# Patient Record
Sex: MALE | Race: WHITE | NOT HISPANIC OR LATINO | Employment: OTHER | URBAN - METROPOLITAN AREA
[De-identification: names, ages, dates, MRNs, and addresses within clinical notes are randomized per-mention and may not be internally consistent; named-entity substitution may affect disease eponyms.]

---

## 2018-12-06 ENCOUNTER — HOSPITAL ENCOUNTER (INPATIENT)
Facility: HOSPITAL | Age: 78
LOS: 12 days | Discharge: HOME HEALTH | DRG: 300 | End: 2018-12-18
Attending: HOSPITALIST | Admitting: HOSPITALIST
Payer: OTHER GOVERNMENT

## 2018-12-06 DIAGNOSIS — M86.171 OTHER ACUTE OSTEOMYELITIS OF RIGHT FOOT: ICD-10-CM

## 2018-12-06 DIAGNOSIS — M86.071 ACUTE HEMATOGENOUS OSTEOMYELITIS OF RIGHT FOOT: ICD-10-CM

## 2018-12-06 DIAGNOSIS — S91.301A OPEN WOUND OF RIGHT FOOT, INITIAL ENCOUNTER: ICD-10-CM

## 2018-12-06 DIAGNOSIS — I73.9 PERIPHERAL ARTERIAL DISEASE: ICD-10-CM

## 2018-12-06 DIAGNOSIS — I48.91 ATRIAL FIBRILLATION WITH SLOW VENTRICULAR RESPONSE: ICD-10-CM

## 2018-12-06 DIAGNOSIS — S98.111A AMPUTATED GREAT TOE OF RIGHT FOOT: ICD-10-CM

## 2018-12-06 PROBLEM — M86.9 OSTEOMYELITIS OF RIGHT FOOT: Status: ACTIVE | Noted: 2018-12-06

## 2018-12-06 PROBLEM — E11.9 TYPE 2 DIABETES MELLITUS, WITHOUT LONG-TERM CURRENT USE OF INSULIN: Status: ACTIVE | Noted: 2018-12-06

## 2018-12-06 PROBLEM — I50.22 CHRONIC SYSTOLIC HEART FAILURE: Status: ACTIVE | Noted: 2018-12-06

## 2018-12-06 PROBLEM — I10 ESSENTIAL HYPERTENSION: Status: ACTIVE | Noted: 2018-12-06

## 2018-12-06 LAB
ALBUMIN SERPL BCP-MCNC: 2.9 G/DL
ALP SERPL-CCNC: 90 U/L
ALT SERPL W/O P-5'-P-CCNC: 37 U/L
ANION GAP SERPL CALC-SCNC: 16 MMOL/L
APTT BLDCRRT: 25.8 SEC
AST SERPL-CCNC: 28 U/L
BASOPHILS # BLD AUTO: 0.09 K/UL
BASOPHILS NFR BLD: 0.8 %
BILIRUB SERPL-MCNC: 0.6 MG/DL
BUN SERPL-MCNC: 37 MG/DL
CALCIUM SERPL-MCNC: 10.5 MG/DL
CHLORIDE SERPL-SCNC: 102 MMOL/L
CO2 SERPL-SCNC: 20 MMOL/L
CREAT SERPL-MCNC: 1.6 MG/DL
DIFFERENTIAL METHOD: ABNORMAL
EOSINOPHIL # BLD AUTO: 0.4 K/UL
EOSINOPHIL NFR BLD: 3.2 %
ERYTHROCYTE [DISTWIDTH] IN BLOOD BY AUTOMATED COUNT: 13.3 %
EST. GFR  (AFRICAN AMERICAN): 46.7 ML/MIN/1.73 M^2
EST. GFR  (NON AFRICAN AMERICAN): 40.4 ML/MIN/1.73 M^2
GLUCOSE SERPL-MCNC: 137 MG/DL
HCT VFR BLD AUTO: 38.6 %
HGB BLD-MCNC: 12.2 G/DL
IMM GRANULOCYTES # BLD AUTO: 0.07 K/UL
IMM GRANULOCYTES NFR BLD AUTO: 0.6 %
INR PPP: 1.2
LYMPHOCYTES # BLD AUTO: 1.7 K/UL
LYMPHOCYTES NFR BLD: 14.4 %
MCH RBC QN AUTO: 29.3 PG
MCHC RBC AUTO-ENTMCNC: 31.6 G/DL
MCV RBC AUTO: 93 FL
MONOCYTES # BLD AUTO: 1.1 K/UL
MONOCYTES NFR BLD: 9.1 %
NEUTROPHILS # BLD AUTO: 8.3 K/UL
NEUTROPHILS NFR BLD: 71.9 %
NRBC BLD-RTO: 0 /100 WBC
PLATELET # BLD AUTO: 316 K/UL
PMV BLD AUTO: 10.5 FL
POCT GLUCOSE: 172 MG/DL (ref 70–110)
POTASSIUM SERPL-SCNC: 4.8 MMOL/L
PROT SERPL-MCNC: 8.5 G/DL
PROTHROMBIN TIME: 11.8 SEC
RBC # BLD AUTO: 4.17 M/UL
SODIUM SERPL-SCNC: 138 MMOL/L
WBC # BLD AUTO: 11.5 K/UL

## 2018-12-06 PROCEDURE — 94660 CPAP INITIATION&MGMT: CPT

## 2018-12-06 PROCEDURE — 99223 1ST HOSP IP/OBS HIGH 75: CPT | Mod: AI,GC,, | Performed by: STUDENT IN AN ORGANIZED HEALTH CARE EDUCATION/TRAINING PROGRAM

## 2018-12-06 PROCEDURE — 63600175 PHARM REV CODE 636 W HCPCS: Performed by: STUDENT IN AN ORGANIZED HEALTH CARE EDUCATION/TRAINING PROGRAM

## 2018-12-06 PROCEDURE — 11000001 HC ACUTE MED/SURG PRIVATE ROOM

## 2018-12-06 PROCEDURE — 85025 COMPLETE CBC W/AUTO DIFF WBC: CPT

## 2018-12-06 PROCEDURE — 85610 PROTHROMBIN TIME: CPT

## 2018-12-06 PROCEDURE — 93005 ELECTROCARDIOGRAM TRACING: CPT

## 2018-12-06 PROCEDURE — 25000003 PHARM REV CODE 250: Performed by: STUDENT IN AN ORGANIZED HEALTH CARE EDUCATION/TRAINING PROGRAM

## 2018-12-06 PROCEDURE — 87040 BLOOD CULTURE FOR BACTERIA: CPT

## 2018-12-06 PROCEDURE — 80053 COMPREHEN METABOLIC PANEL: CPT

## 2018-12-06 PROCEDURE — 85730 THROMBOPLASTIN TIME PARTIAL: CPT

## 2018-12-06 PROCEDURE — 36415 COLL VENOUS BLD VENIPUNCTURE: CPT

## 2018-12-06 PROCEDURE — 93010 ELECTROCARDIOGRAM REPORT: CPT | Mod: ,,, | Performed by: INTERNAL MEDICINE

## 2018-12-06 RX ORDER — INSULIN ASPART 100 [IU]/ML
0-5 INJECTION, SOLUTION INTRAVENOUS; SUBCUTANEOUS
Status: DISCONTINUED | OUTPATIENT
Start: 2018-12-06 | End: 2018-12-18 | Stop reason: HOSPADM

## 2018-12-06 RX ORDER — METFORMIN HYDROCHLORIDE 500 MG/1
1000 TABLET ORAL
COMMUNITY

## 2018-12-06 RX ORDER — ACETAMINOPHEN 325 MG/1
650 TABLET ORAL EVERY 4 HOURS PRN
Status: DISCONTINUED | OUTPATIENT
Start: 2018-12-06 | End: 2018-12-18 | Stop reason: HOSPADM

## 2018-12-06 RX ORDER — METOPROLOL SUCCINATE 25 MG/1
25 TABLET, EXTENDED RELEASE ORAL DAILY
COMMUNITY

## 2018-12-06 RX ORDER — ASPIRIN 81 MG/1
81 TABLET ORAL
COMMUNITY

## 2018-12-06 RX ORDER — LISINOPRIL 10 MG/1
5 TABLET ORAL DAILY
Status: ON HOLD | COMMUNITY
End: 2018-12-18 | Stop reason: HOSPADM

## 2018-12-06 RX ORDER — FUROSEMIDE 40 MG/1
40 TABLET ORAL 2 TIMES DAILY
COMMUNITY

## 2018-12-06 RX ORDER — SODIUM CHLORIDE 0.9 % (FLUSH) 0.9 %
5 SYRINGE (ML) INJECTION
Status: DISCONTINUED | OUTPATIENT
Start: 2018-12-06 | End: 2018-12-18 | Stop reason: HOSPADM

## 2018-12-06 RX ORDER — ACETAMINOPHEN 325 MG/1
650 TABLET ORAL EVERY 8 HOURS PRN
Status: DISCONTINUED | OUTPATIENT
Start: 2018-12-06 | End: 2018-12-18 | Stop reason: HOSPADM

## 2018-12-06 RX ORDER — IBUPROFEN 200 MG
16 TABLET ORAL
Status: DISCONTINUED | OUTPATIENT
Start: 2018-12-06 | End: 2018-12-18 | Stop reason: HOSPADM

## 2018-12-06 RX ORDER — METFORMIN HYDROCHLORIDE 500 MG/1
500 TABLET ORAL 2 TIMES DAILY WITH MEALS
Status: ON HOLD | COMMUNITY
End: 2018-12-06 | Stop reason: CLARIF

## 2018-12-06 RX ORDER — HEPARIN 100 UNIT/ML
10 SYRINGE INTRAVENOUS
Status: DISCONTINUED | OUTPATIENT
Start: 2018-12-06 | End: 2018-12-18 | Stop reason: HOSPADM

## 2018-12-06 RX ORDER — GLUCAGON 1 MG
1 KIT INJECTION
Status: DISCONTINUED | OUTPATIENT
Start: 2018-12-06 | End: 2018-12-18 | Stop reason: HOSPADM

## 2018-12-06 RX ORDER — ASPIRIN 81 MG/1
81 TABLET ORAL DAILY
Status: ON HOLD | COMMUNITY
End: 2018-12-06 | Stop reason: CLARIF

## 2018-12-06 RX ORDER — DABIGATRAN ETEXILATE 150 MG/1
150 CAPSULE ORAL DAILY
COMMUNITY

## 2018-12-06 RX ORDER — HEPARIN SODIUM,PORCINE/D5W 25000/250
12 INTRAVENOUS SOLUTION INTRAVENOUS CONTINUOUS
Status: DISCONTINUED | OUTPATIENT
Start: 2018-12-06 | End: 2018-12-11

## 2018-12-06 RX ORDER — GLIPIZIDE 10 MG/1
10 TABLET ORAL
Status: ON HOLD | COMMUNITY
End: 2018-12-06 | Stop reason: CLARIF

## 2018-12-06 RX ORDER — PRAVASTATIN SODIUM 10 MG/1
20 TABLET ORAL DAILY
Status: ON HOLD | COMMUNITY
End: 2018-12-06 | Stop reason: CLARIF

## 2018-12-06 RX ORDER — GLIPIZIDE 10 MG/1
10 TABLET ORAL NIGHTLY
COMMUNITY

## 2018-12-06 RX ORDER — IBUPROFEN 200 MG
24 TABLET ORAL
Status: DISCONTINUED | OUTPATIENT
Start: 2018-12-06 | End: 2018-12-18 | Stop reason: HOSPADM

## 2018-12-06 RX ORDER — SIMVASTATIN 20 MG/1
20 TABLET, FILM COATED ORAL DAILY
Status: DISCONTINUED | OUTPATIENT
Start: 2018-12-07 | End: 2018-12-07

## 2018-12-06 RX ORDER — FUROSEMIDE 40 MG/1
40 TABLET ORAL 2 TIMES DAILY
Status: DISCONTINUED | OUTPATIENT
Start: 2018-12-06 | End: 2018-12-18 | Stop reason: HOSPADM

## 2018-12-06 RX ORDER — ACETAMINOPHEN 500 MG
1000 TABLET ORAL EVERY 8 HOURS PRN
Status: DISCONTINUED | OUTPATIENT
Start: 2018-12-06 | End: 2018-12-18 | Stop reason: HOSPADM

## 2018-12-06 RX ORDER — SIMVASTATIN 10 MG/1
10 TABLET, FILM COATED ORAL NIGHTLY
Status: ON HOLD | COMMUNITY
End: 2018-12-07

## 2018-12-06 RX ORDER — LISINOPRIL 20 MG/1
40 TABLET ORAL DAILY
Status: DISCONTINUED | OUTPATIENT
Start: 2018-12-07 | End: 2018-12-06

## 2018-12-06 RX ADMIN — ACETAMINOPHEN 1000 MG: 500 TABLET, FILM COATED ORAL at 09:12

## 2018-12-06 RX ADMIN — CEFTRIAXONE 2 G: 2 INJECTION, SOLUTION INTRAVENOUS at 11:12

## 2018-12-06 RX ADMIN — HEPARIN SODIUM AND DEXTROSE 12 UNITS/KG/HR: 10000; 5 INJECTION INTRAVENOUS at 09:12

## 2018-12-06 RX ADMIN — FUROSEMIDE 40 MG: 40 TABLET ORAL at 09:12

## 2018-12-06 NOTE — PLAN OF CARE
"[facility] Acceptance [facility]      Transferring Facility/Hospital: [facility]      Referring Provider/Specialty giving report: Dr. Ignacio Aimn - [facility] medicine      Accepting Physician for admission to hospital: Khang Osorio MD    Date of Admission to Out    Date of acceptance:  12/6/2018    Patients name: Jai Godinez       Allergies:Review of patient's allergies indicates:  No Known Allergies     Reason for transfer:  Vascular surgery consult/CO2 angiography     Overview/ Report from Physician/Mid-Level Provider:    HPI:  77 y/o w/ CAD s/p CABG, Dm type 2, HTN, admitted to [facility] on 11/28 for acute on chronic systolic HF exacerbation, type 2 MI, and diabetic foot ulcer w/ infection concern for osteomyelitis and new onset afib with slow ventricular rate.     Patient with Right toe amputation done on 12/3, non-pulsatile blood flow after, podiatry has left wound open and pre-op DANILO show severe PAD. Vascular @ [facility] wanted to due angioplasty post-op, but CO2 angiography not available at [facility], patient was arranged for transfer to [facility] but limited by census.  Patient/family requested transfer elsewhere to accomplish procedure.  [facility] [place] and case was discussed with Dr. Gomez -Vascular, requested patient to transfer to [facility] medicine with vascular surgery consults.       S/p Amputation due to lack of blood flow, wound was left open with plans for wound closure after vascular intervention.  Patient receiving daily wound care with iodoform packing gauze, partial weight bearing to right heel.     [facility] sent partial records/progress notes to review.  Case discussed with Dr. Amin, brief summary belwo    For heart failure, noted to be improving and pt switched to ORAL bid lasix, reported that patient is likely "as dry as he will become" has some basilar crackles noted on exam.     For Afib with slow vr, ECHO with LV pressure elevated EF " 45-50% LA enlarged, moderate pHTN, plans to dc patient on Toprol XL 25mg  and chadsvasc is 6, have continued Heparin infusion inpatient but discharge on Dabigatran 150mg BID.     For STACY  - 1.7 on admit and downtrending    SANDIE on CPAP - using his home machine, setting unknown     DM2 - have been holding metformin/glipizide and cardiology recs that patient should be on SGLT2 inhibitor empagliflozin, and GLP-1RA - liraglutide.   A1c 8.1     Right Foot Osteomyelitis.  - MSSA wound and Group G strep from wound cultures that can be treated with doxycycline but clean margin cultures , blood cultures negative, on Ceftriaxone and per ID could be discharged on Doxycycline pending results of clean margin culture.     Code Status FULL CODE     VS: Temp:  [98.3 °F (36.8 °C)]   Pulse:  [59]   Resp:  [18]   BP: (113)/(59)   SpO2:  [97 %]       Labs:  12/06  Cbc 10.7  hgb 11.3 hct 33.1  plt     Cr 1.4, BUN 42      Radiographs:     DANILO Right TBI 0.00, Lt TBI 0.46   Left DANILO 0.7,  Right DANILO reported as falsely elevated.         To Do List upon arrival:    1. Keep NPO - Consult Vascular Surgery to determine timing of procedure   >Discuss if repeat DANILO or vascular lab studies indicated  2. Continue IV Heparin Infusion   3. Continue prior management for CHF, Diabetes  4. Continue IV ceftriaxone.  Dr. Amin ([phone]) requests to be called in coming days to provide update on pending clean margin cultures.   5.  Consult Podiatry for ongoing wound care and post-op management.    >pending completion of vascular procedure discuss with podiatry regarding completion of wound closure @ [facility] vs transfer back to [facility] for podiatry service to perform wound closure.   6. PT consultation.      Please call extension [phone] (if nobody answers, this will flip to a beeper, so put in your call back number) upon patient arrival to floor for Hospital Medicine admit team assignment and for additional admit orders for the patient.  Do not page the  attending, staff physician associate with the patient on arrival (may not be in-house at the time of arrival).  Rather, always call [phone] to reach the triage physician for orders and team assignment.         Khang Osorio M.D.  Attending Physician  [facility] Medicine Dept.  Pager: [phone]

## 2018-12-07 PROBLEM — S91.301A OPEN WOUND OF RIGHT FOOT: Status: ACTIVE | Noted: 2018-12-07

## 2018-12-07 PROBLEM — N18.30 STAGE 3 CHRONIC KIDNEY DISEASE: Status: ACTIVE | Noted: 2018-12-07

## 2018-12-07 LAB
ANION GAP SERPL CALC-SCNC: 10 MMOL/L
APTT BLDCRRT: 30.4 SEC
APTT BLDCRRT: 38 SEC
BASOPHILS # BLD AUTO: 0.08 K/UL
BASOPHILS NFR BLD: 0.8 %
BILIRUB UR QL STRIP: NEGATIVE
BUN SERPL-MCNC: 34 MG/DL
CALCIUM SERPL-MCNC: 9.7 MG/DL
CHLORIDE SERPL-SCNC: 103 MMOL/L
CLARITY UR REFRACT.AUTO: CLEAR
CO2 SERPL-SCNC: 23 MMOL/L
COLOR UR AUTO: YELLOW
CREAT SERPL-MCNC: 1.5 MG/DL
CREAT UR-MCNC: 80 MG/DL
CRP SERPL-MCNC: 93.42 MG/L
DIFFERENTIAL METHOD: ABNORMAL
EOSINOPHIL # BLD AUTO: 0.4 K/UL
EOSINOPHIL NFR BLD: 4.1 %
ERYTHROCYTE [DISTWIDTH] IN BLOOD BY AUTOMATED COUNT: 13.4 %
ERYTHROCYTE [SEDIMENTATION RATE] IN BLOOD BY WESTERGREN METHOD: 75 MM/HR
EST. GFR  (AFRICAN AMERICAN): 50.5 ML/MIN/1.73 M^2
EST. GFR  (NON AFRICAN AMERICAN): 43.7 ML/MIN/1.73 M^2
GLUCOSE SERPL-MCNC: 163 MG/DL
GLUCOSE UR QL STRIP: NEGATIVE
HCT VFR BLD AUTO: 34.4 %
HGB BLD-MCNC: 11 G/DL
HGB UR QL STRIP: NEGATIVE
IMM GRANULOCYTES # BLD AUTO: 0.06 K/UL
IMM GRANULOCYTES NFR BLD AUTO: 0.6 %
KETONES UR QL STRIP: NEGATIVE
LEUKOCYTE ESTERASE UR QL STRIP: NEGATIVE
LYMPHOCYTES # BLD AUTO: 1.9 K/UL
LYMPHOCYTES NFR BLD: 18.2 %
MCH RBC QN AUTO: 29.6 PG
MCHC RBC AUTO-ENTMCNC: 32 G/DL
MCV RBC AUTO: 93 FL
MONOCYTES # BLD AUTO: 1.2 K/UL
MONOCYTES NFR BLD: 11.7 %
NEUTROPHILS # BLD AUTO: 6.6 K/UL
NEUTROPHILS NFR BLD: 64.6 %
NITRITE UR QL STRIP: NEGATIVE
NRBC BLD-RTO: 0 /100 WBC
OSMOLALITY UR: 467 MOSM/KG
PH UR STRIP: 5 [PH] (ref 5–8)
PLATELET # BLD AUTO: 289 K/UL
PMV BLD AUTO: 10.9 FL
POCT GLUCOSE: 153 MG/DL (ref 70–110)
POCT GLUCOSE: 189 MG/DL (ref 70–110)
POCT GLUCOSE: 191 MG/DL (ref 70–110)
POCT GLUCOSE: 232 MG/DL (ref 70–110)
POTASSIUM SERPL-SCNC: 4.6 MMOL/L
PROT UR QL STRIP: NEGATIVE
RBC # BLD AUTO: 3.71 M/UL
SODIUM SERPL-SCNC: 136 MMOL/L
SODIUM UR-SCNC: 74 MMOL/L
SP GR UR STRIP: 1.01 (ref 1–1.03)
URN SPEC COLLECT METH UR: NORMAL
UUN UR-MCNC: 634 MG/DL
WBC # BLD AUTO: 10.19 K/UL

## 2018-12-07 PROCEDURE — 99223 1ST HOSP IP/OBS HIGH 75: CPT | Mod: ,,, | Performed by: SURGERY

## 2018-12-07 PROCEDURE — 85652 RBC SED RATE AUTOMATED: CPT

## 2018-12-07 PROCEDURE — 25000003 PHARM REV CODE 250: Performed by: STUDENT IN AN ORGANIZED HEALTH CARE EDUCATION/TRAINING PROGRAM

## 2018-12-07 PROCEDURE — 80048 BASIC METABOLIC PNL TOTAL CA: CPT

## 2018-12-07 PROCEDURE — 86141 C-REACTIVE PROTEIN HS: CPT

## 2018-12-07 PROCEDURE — 81003 URINALYSIS AUTO W/O SCOPE: CPT

## 2018-12-07 PROCEDURE — 63600175 PHARM REV CODE 636 W HCPCS: Performed by: STUDENT IN AN ORGANIZED HEALTH CARE EDUCATION/TRAINING PROGRAM

## 2018-12-07 PROCEDURE — 87070 CULTURE OTHR SPECIMN AEROBIC: CPT

## 2018-12-07 PROCEDURE — 93880 EXTRACRANIAL BILAT STUDY: CPT | Performed by: SURGERY

## 2018-12-07 PROCEDURE — 84540 ASSAY OF URINE/UREA-N: CPT

## 2018-12-07 PROCEDURE — 85025 COMPLETE CBC W/AUTO DIFF WBC: CPT

## 2018-12-07 PROCEDURE — 82570 ASSAY OF URINE CREATININE: CPT

## 2018-12-07 PROCEDURE — 85730 THROMBOPLASTIN TIME PARTIAL: CPT | Mod: 91

## 2018-12-07 PROCEDURE — 87075 CULTR BACTERIA EXCEPT BLOOD: CPT

## 2018-12-07 PROCEDURE — 83935 ASSAY OF URINE OSMOLALITY: CPT

## 2018-12-07 PROCEDURE — 11000001 HC ACUTE MED/SURG PRIVATE ROOM

## 2018-12-07 PROCEDURE — 99223 1ST HOSP IP/OBS HIGH 75: CPT | Mod: ,,, | Performed by: PODIATRIST

## 2018-12-07 PROCEDURE — 85730 THROMBOPLASTIN TIME PARTIAL: CPT

## 2018-12-07 PROCEDURE — A4216 STERILE WATER/SALINE, 10 ML: HCPCS | Performed by: STUDENT IN AN ORGANIZED HEALTH CARE EDUCATION/TRAINING PROGRAM

## 2018-12-07 PROCEDURE — 93922 UPR/L XTREMITY ART 2 LEVELS: CPT | Performed by: SURGERY

## 2018-12-07 PROCEDURE — 84300 ASSAY OF URINE SODIUM: CPT

## 2018-12-07 PROCEDURE — 36415 COLL VENOUS BLD VENIPUNCTURE: CPT

## 2018-12-07 PROCEDURE — 99233 SBSQ HOSP IP/OBS HIGH 50: CPT | Mod: GC,,, | Performed by: STUDENT IN AN ORGANIZED HEALTH CARE EDUCATION/TRAINING PROGRAM

## 2018-12-07 PROCEDURE — 63600175 PHARM REV CODE 636 W HCPCS: Performed by: NURSE PRACTITIONER

## 2018-12-07 PROCEDURE — 99223 1ST HOSP IP/OBS HIGH 75: CPT | Mod: ,,, | Performed by: INTERNAL MEDICINE

## 2018-12-07 RX ORDER — PRAVASTATIN SODIUM 10 MG/1
10 TABLET ORAL DAILY
Status: DISCONTINUED | OUTPATIENT
Start: 2018-12-08 | End: 2018-12-12

## 2018-12-07 RX ORDER — SODIUM CHLORIDE, SODIUM LACTATE, POTASSIUM CHLORIDE, CALCIUM CHLORIDE 600; 310; 30; 20 MG/100ML; MG/100ML; MG/100ML; MG/100ML
INJECTION, SOLUTION INTRAVENOUS CONTINUOUS
Status: ACTIVE | OUTPATIENT
Start: 2018-12-07 | End: 2018-12-07

## 2018-12-07 RX ORDER — CEFAZOLIN SODIUM 1 G/3ML
2 INJECTION, POWDER, FOR SOLUTION INTRAMUSCULAR; INTRAVENOUS
Status: DISCONTINUED | OUTPATIENT
Start: 2018-12-07 | End: 2018-12-10

## 2018-12-07 RX ORDER — PRAVASTATIN SODIUM 20 MG/1
10 TABLET ORAL DAILY
Status: ON HOLD | COMMUNITY
End: 2018-12-18 | Stop reason: HOSPADM

## 2018-12-07 RX ADMIN — CEFAZOLIN 2 G: 330 INJECTION, POWDER, FOR SOLUTION INTRAMUSCULAR; INTRAVENOUS at 09:12

## 2018-12-07 RX ADMIN — SODIUM CHLORIDE, SODIUM LACTATE, POTASSIUM CHLORIDE, AND CALCIUM CHLORIDE: .6; .31; .03; .02 INJECTION, SOLUTION INTRAVENOUS at 12:12

## 2018-12-07 RX ADMIN — HEPARIN SODIUM AND DEXTROSE 12 UNITS/KG/HR: 10000; 5 INJECTION INTRAVENOUS at 02:12

## 2018-12-07 RX ADMIN — HEPARIN SODIUM AND DEXTROSE 17 UNITS/KG/HR: 10000; 5 INJECTION INTRAVENOUS at 07:12

## 2018-12-07 RX ADMIN — CEFAZOLIN 2 G: 330 INJECTION, POWDER, FOR SOLUTION INTRAMUSCULAR; INTRAVENOUS at 02:12

## 2018-12-07 RX ADMIN — ACETAMINOPHEN 650 MG: 325 TABLET ORAL at 09:12

## 2018-12-07 RX ADMIN — FUROSEMIDE 40 MG: 40 TABLET ORAL at 09:12

## 2018-12-07 RX ADMIN — ACETAMINOPHEN 1000 MG: 500 TABLET, FILM COATED ORAL at 08:12

## 2018-12-07 RX ADMIN — SIMVASTATIN 20 MG: 20 TABLET, FILM COATED ORAL at 08:12

## 2018-12-07 RX ADMIN — ACETAMINOPHEN 1000 MG: 500 TABLET, FILM COATED ORAL at 04:12

## 2018-12-07 RX ADMIN — FUROSEMIDE 40 MG: 40 TABLET ORAL at 08:12

## 2018-12-07 RX ADMIN — Medication 5 ML: at 09:12

## 2018-12-07 RX ADMIN — INSULIN ASPART 2 UNITS: 100 INJECTION, SOLUTION INTRAVENOUS; SUBCUTANEOUS at 05:12

## 2018-12-07 NOTE — SUBJECTIVE & OBJECTIVE
"Past Medical History:   Diagnosis Date    CHF (congestive heart failure)     Coronary artery disease     Diabetes mellitus     Diverticulosis     Hx of CABG     Hypertension        Past Surgical History:   Procedure Laterality Date    APPENDECTOMY      BYPASS GRAFT         Review of patient's allergies indicates:  No Known Allergies    No current facility-administered medications on file prior to encounter.      Current Outpatient Medications on File Prior to Encounter   Medication Sig    aspirin (ECOTRIN) 81 MG EC tablet Take 81 mg by mouth.    dabigatran etexilate (PRADAXA) 150 mg Cap Take 150 mg by mouth once daily. "Do NOT break, chew, or open capsules."    furosemide (LASIX) 40 MG tablet Take 40 mg by mouth 2 (two) times daily.    glipiZIDE (GLUCOTROL) 10 MG tablet Take 10 mg by mouth.    lisinopril (PRINIVIL,ZESTRIL) 40 MG tablet Take 40 mg by mouth.    metFORMIN (GLUCOPHAGE) 500 MG tablet Take 500 mg by mouth.    metoprolol succinate (TOPROL-XL) 25 MG 24 hr tablet Take 25 mg by mouth once daily.    simvastatin (ZOCOR) 20 MG tablet Take 20 mg by mouth.    [DISCONTINUED] aspirin (ECOTRIN) 81 MG EC tablet Take 81 mg by mouth once daily.    [DISCONTINUED] glipiZIDE (GLUCOTROL) 10 MG tablet Take 10 mg by mouth 2 (two) times daily before meals.    [DISCONTINUED] metFORMIN (GLUCOPHAGE) 500 MG tablet Take 500 mg by mouth 2 (two) times daily with meals.    [DISCONTINUED] pravastatin (PRAVACHOL) 10 MG tablet Take 20 mg by mouth once daily.     Family History     Problem Relation (Age of Onset)    Arthritis Sister    Dementia Mother    No Known Problems Father        Tobacco Use    Smoking status: Former Smoker     Packs/day: 1.00     Years: 25.00     Pack years: 25.00     Types: Cigarettes    Smokeless tobacco: Never Used   Substance and Sexual Activity    Alcohol use: No     Frequency: Never    Drug use: No    Sexual activity: Not Currently     Partners: Female     Review of Systems "   Constitutional: Negative for appetite change, chills, diaphoresis, fatigue and fever.   HENT: Negative for congestion, sore throat and trouble swallowing.    Eyes: Negative for photophobia, pain and discharge.   Respiratory: Negative for cough, chest tightness and shortness of breath.    Cardiovascular: Negative for chest pain, palpitations and leg swelling.   Gastrointestinal: Negative for abdominal distention, abdominal pain, diarrhea, nausea and vomiting.   Genitourinary: Negative for difficulty urinating and dysuria.   Musculoskeletal: Positive for joint swelling. Negative for back pain, myalgias and neck pain.   Skin: Negative for color change, pallor and rash.   Neurological: Negative for dizziness, light-headedness, numbness and headaches.     Objective:     Vital Signs (Most Recent):  Temp: 97.6 °F (36.4 °C) (12/06/18 1813)  Pulse: (!) 56 (12/06/18 1813)  Resp: 18 (12/06/18 1813)  BP: 125/62 (12/06/18 1813)  SpO2: 96 % (12/06/18 1813) Vital Signs (24h Range):  Temp:  [97.6 °F (36.4 °C)-98.3 °F (36.8 °C)] 97.6 °F (36.4 °C)  Pulse:  [45-59] 56  Resp:  [18] 18  SpO2:  [96 %-97 %] 96 %  BP: (101-125)/(53-62) 125/62        There is no height or weight on file to calculate BMI.    Physical Exam   Constitutional: He is oriented to person, place, and time. He appears well-developed and well-nourished. No distress.   HENT:   Head: Normocephalic and atraumatic.   Mouth/Throat: Uvula is midline, oropharynx is clear and moist and mucous membranes are normal.   Eyes: Conjunctivae are normal. Pupils are equal, round, and reactive to light.   Neck: Neck supple. No JVD present.   Cardiovascular: Normal rate, normal heart sounds and normal pulses. An irregularly irregular rhythm present.   No murmur heard.  Pulses:       Radial pulses are 2+ on the right side, and 2+ on the left side.        Dorsalis pedis pulses are 2+ on the right side, and 2+ on the left side.   Pulmonary/Chest: Effort normal and breath sounds normal.  No respiratory distress.   There are minimal crackles at the posterior lower lobes.    Abdominal: Soft. Normal appearance and bowel sounds are normal. He exhibits no distension. There is no tenderness.   Musculoskeletal:   There is no swelling of the lower extremities. Right foot wrapped in Kerlex.    Neurological: He is alert and oriented to person, place, and time. No cranial nerve deficit. GCS eye subscore is 4. GCS verbal subscore is 5. GCS motor subscore is 6. He displays no Babinski's sign on the left side.   Skin: Skin is warm and dry. No bruising and no rash noted.         CRANIAL NERVES     CN III, IV, VI   Pupils are equal, round, and reactive to light.      Significant Labs: A1C: No results for input(s): HGBA1C in the last 4320 hours.     Blood Culture: No results for input(s): LABBLOO in the last 48 hours.     BMP: No results for input(s): GLU, NA, K, CL, CO2, BUN, CREATININE, CALCIUM, MG in the last 48 hours.     CBC: No results for input(s): WBC, HGB, HCT, PLT in the last 48 hours.    Significant Imaging: I have reviewed all pertinent imaging results/findings within the past 24 hours.

## 2018-12-07 NOTE — PROGRESS NOTES
[facility] Hospital Medicine  Progress Note    Patient Name: Jai Godinez  MRN: [id number]  Patient Class: IP- Inpatient   Admission Date: 12/6/2018  Length of Stay: 1 days  Attending Physician: Tess Garcia MD  Primary Care Provider: Pepe Valverde MD    [facility] Medicine Team: Networked reference to record PCT  Miguel Angel Jones MD    Subjective:     Principal Problem:Amputated great toe of right foot    HPI:  This is a 78 year old male transferred from the [facility] for vascular surgery repair of right great toe status post amputation. He has a PMH of quadruple bypass (2001), severe peripheral artery disease, HFrEF (EF 45-50%), T2DM (A1c 8.1), HTN, SANDIE (on CPAP nightly) and diverticulosis.     He was hospitalized in [place] from 11/14 to 11/21 for acute on chronic heart failure exacerbation; discharge home with resolution of symptoms. He then developed one day of progressively worsening swelling of the right great toe associated with erythematous skin changes; for relief, he cut the skin of the toe with his pocket knife, at which point noticed purulent discharge, [facility]     Patient underwent right toe amputation done on 12/3 for pre-operative DANILO indicative of severe PAD. His post-operative course was complicated by development non-pulsatile blood flow after, as a result podiatry has left wound open. Vascular  at [facility] wanted to due angioplasty post-op, but CO2 angiography wasn't available. Also, concern for possible right foot osteomyelitis with wound cultures growing MSSA and Group G Streptococcus; blood cultures negative; ID there treated with Ceftriaxone while in-patient, with plans to discharge on Doxycycline.  In addition, on presentation, he was noted to have new development of Afib with slow ventricular response; rate control with Metoprolol and started on heparin drip while inpatient; transitioned to Dabigatran prior to transfer.     Patient/family requested  transfer elsewhere to accomplish procedure. [facility] [place] and case was discussed with Dr. Patricia SaldanaVascular,  who requested patient to transfer to hospital medicine with vascular surgery consults.        On arrival here, he reports episodic cramping pain isolated to the amputation site occurring approximately Q5min. Pain relieved by taking Tylenol PRN. Denies fevers, chills, chest pain, SOB, palpitations, numbness of extremities.         Hospital Course:  12/07: No acute events overnight. Vascular surgery assessed this morning; recommended repeat DANILO and tentative angiogram today. Wound care and podiatry consulted for open wound and right foot osteomyelitis.  Wound care defering to podiatry. Podiatry repeating cultures and XR of foot. ID consulted for anticipated stop date regarding antibiotics.     Interval History: Patient seen at bedside this morning. He reports sleeping well throughout the night. His foot and toe pain continues to be controlled with Tylenol PRN. He was NPO at midnight for anticipated vascular procedure today. He is urinating well. He has no complaints.     Review of Systems   Constitutional: Negative for appetite change, chills, diaphoresis, fatigue and fever.   HENT: Negative for congestion, sore throat and trouble swallowing.    Eyes: Negative for photophobia, pain and discharge.   Respiratory: Negative for cough, chest tightness and shortness of breath.    Cardiovascular: Negative for chest pain, palpitations and leg swelling.   Gastrointestinal: Negative for abdominal pain, diarrhea, nausea and vomiting.   Genitourinary: Negative for difficulty urinating.   Musculoskeletal: Negative for back pain, myalgias and neck pain.   Skin: Positive for wound. Negative for pallor and rash.   Neurological: Negative for light-headedness, numbness and headaches.     Objective:     Vital Signs (Most Recent):  Temp: 98.5 °F (36.9 °C) (12/07/18 0843)  Pulse: 63 (12/07/18 0843)  Resp: 18  (12/07/18 0843)  BP: 138/60 (12/07/18 0843)  SpO2: (!) 93 % (12/07/18 0843) Vital Signs (24h Range):  Temp:  [97.6 °F (36.4 °C)-98.5 °F (36.9 °C)] 98.5 °F (36.9 °C)  Pulse:  [45-68] 63  Resp:  [18-20] 18  SpO2:  [93 %-98 %] 93 %  BP: (101-138)/(53-75) 138/60     Weight: 100.7 kg (221 lb 14.4 oz)  Body mass index is 32.77 kg/m².    Intake/Output Summary (Last 24 hours) at 12/7/2018 0857  Last data filed at 12/7/2018 0606  Gross per 24 hour   Intake 398.9 ml   Output --   Net 398.9 ml      Physical Exam   Constitutional: He is oriented to person, place, and time. He appears well-developed and well-nourished. No distress.   HENT:   Head: Normocephalic and atraumatic.   Mouth/Throat: Uvula is midline, oropharynx is clear and moist and mucous membranes are normal.   Eyes: Conjunctivae are normal. Pupils are equal, round, and reactive to light.   Neck: No JVD present.   Cardiovascular: Normal rate and normal heart sounds. An irregularly irregular rhythm present.   No murmur heard.  Pulses:       Radial pulses are 2+ on the right side, and 2+ on the left side.        Dorsalis pedis pulses are 1+ on the right side, and 2+ on the left side.   Pulmonary/Chest: Effort normal and breath sounds normal. No respiratory distress.   Abdominal: Soft. Normal appearance and bowel sounds are normal. He exhibits no distension. There is no tenderness.   Neurological: He is alert and oriented to person, place, and time.   Skin: Skin is warm and dry. No bruising and no rash noted.     Significant Labs:   Blood Culture:   Recent Labs   Lab 12/06/18 1951   LABBLOO No Growth to date  No Growth to date     BMP:   Recent Labs   Lab 12/07/18 0459   *      K 4.6      CO2 23   BUN 34*   CREATININE 1.5*   CALCIUM 9.7     CBC:   Recent Labs   Lab 12/06/18  1950 12/07/18 0459   WBC 11.50 10.19   HGB 12.2* 11.0*   HCT 38.6* 34.4*    289     Coagulation:   Recent Labs   Lab 12/06/18  1950  12/07/18  0459   INR 1.2  --   --     APTT  --    < > 30.4    < > = values in this interval not displayed.     Magnesium: No results for input(s): MG in the last 48 hours.    Significant Imaging: I have reviewed all pertinent imaging results/findings within the past 24 hours.    Assessment/Plan:      * Amputated great toe of right foot    · Status post amputation of first toe of right foot on 12/03 at [facility] secondary to either severe PAD with possible super-imposed osteomyelitis.   · [facility] that [facility] course was complicated by development non-pulsatile blood flow after, as a result podiatry has left wound open. Vascular at [facility] wanted to due angioplasty post-op, but CO2 angiography wasn't available, prompting transfer here. See vascular surgery and podiatry note for images of wound.   · Case discussed with Dr. Gomez in Vascular Surgery here at transfer. Vascular surgery evaluated; recommended repeating DANILO's and tentatively planning for angiogram today. DANILO's ordered.   · Exam notable for diminished pulses in right foot and lower extremity.   · NPO for possible vascular procedure.   · Will resume heparin drip at low intensity for anticoagulation with new Afib.   · Wound care consulted; deferred care recommendations to podiatry.        Open wound of right foot    See assessment for right great toe amputation and right foot osteomyelitis.        Stage 3 chronic kidney disease    · Unclear of baseline; no outside records available.   · Could be acute, but patient with history of poorly controlled T2DM.   · BUN 37, Cr 1.6, and GFR 40 on admission.   · Holding home Lisinopril in setting of possible STACY.   · LR maintenance ordered.   · BMP daily.           Osteomyelitis of right foot    · Concern for possible right foot osteomyelitis with wound cultures growing MSSA and Group G Streptococcus; blood cultures negative; ID there treated with Ceftriaxone while in-patient, with plans to discharge on Doxycycline.  · Unclear if patient had imaging  studies suggesting or confirming.   · Afebrile. No leukocytosis. ESR and CRP added on for today.   · Blood cultures ordered.   · Resuming Ceftriaxone 2g Q24. ID consulted for recommendations regarding correct choice of antibiotic at [facility] and anticipated stop date of course.   · Podiatry consulted; wound cultures and XR of foot ordered. Wound to be dressed with Betadyne and Kerflex for now. Recommending likely wound vac at discharge.        Atrial fibrillation with slow ventricular response    · Irregular irregular rhythm on exam with HR controlled in lower 60's.   · Will hold home Metoprolol for now.   · Started on heparin infusion at [facility] with plans to transition with Dabigatran.   · INR 1.2 on admission.   · Will start heparin infusion here with low intensity and follow INR until at therapeutic level.        Chronic systolic heart failure    · Last EF 40-50% at [facility] per transfer note.   · No signs of fluid overload on exam.   · Managed with Lasix 40 mg BID, Lisinopril 40 mg, and Metoprolol daily.   · Holding Metoprolol temporarily with HR on admission in lower 60's and Lisinopril with possible STACY.   · Will order cardiac and diabetic diet.         Essential hypertension    · Controlled.   · Resuming home Lisinopril and Lasix, and holding Metoprolol with HR in lower 60's.        Type 2 diabetes mellitus, without long-term current use of insulin    · Last A1c 8 at outside facility.   · Holding home Metformin and Glipizide  · Will order low dose sliding scale on admission.        Peripheral arterial disease    See assessment for amputation of great right toe.          VTE Risk Mitigation (From admission, onward)        Ordered     heparin, porcine (PF) 100 unit/mL injection flush 10 Units  As needed (PRN)      12/06/18 2119     heparin 25,000 units in dextrose 5% 250 mL (100 units/mL) infusion LOW INTENSITY nomogram - [facility]  Continuous      12/06/18 1937     heparin 25,000 units in dextrose 5% (100 units/ml) IV bolus  from bag - ADDITIONAL PRN BOLUS - 30 units/kg  As needed (PRN)      12/06/18 1937     heparin 25,000 units in dextrose 5% (100 units/ml) IV bolus from bag - ADDITIONAL PRN BOLUS - 60 units/kg  As needed (PRN)      12/06/18 1937     IP VTE HIGH RISK PATIENT  Once      12/06/18 1859              Miguel Angel Jones MD  Department of Hospital Medicine   [facility]

## 2018-12-07 NOTE — HPI
This is a 78 year old female transferred from the [facility] for vascular surgery repair of right great toe status post amputation. He has a PMH of quadruple bypass (2001), severe peripheral artery disease, HFrEF (EF 45-50%), T2DM (A1c 8.1), HTN, SANDIE (on CPAP nightly) and diverticulosis.     He was hospitalized in [place] from 11/14 to 11/21 for acute on chronic heart failure exacerbation; discharge home with resolution of symptoms. He then developed one day of progressively worsening swelling of the right great toe associated with erythematous skin changes; for relief, he cut the skin of the toe with his pocket knife, at which point noticed purulent discharge, [facility].     Patient underwent right toe amputation done on 12/3 for pre-operative DANILO indicative of severe PAD. His post-operative course was complicated by development non-pulsatile blood flow after, as a result podiatry has left wound open. Vascular at [facility] wanted to due angioplasty post-op, but CO2 angiography wasn't available. Also, concern for possible right foot osteomyelitis with wound cultures growing MSSA and Group G Streptococcus; blood cultures negative; ID there treated with Ceftriaxone while in-patient, with plans to discharge on Doxycycline.  In addition, on presentation, he was noted to have new development of Afib with slow ventricular response; rate control with Metoprolol and started on heparin drip while inpatient; transitioned to Dabigatran prior to transfer.     Patient/family requested transfer elsewhere to accomplish procedure. [facility] Transfer center contacted today and case was discussed with Dr. Patricia Sweeney, who requested patient to transfer to hospital medicine with vascular surgery consults.        On arrival here, he reports episodic cramping pain isolated to the amputation site occurring approximately Q5min. Pain relieved by taking Tylenol PRN. Denies fevers, chills, chest pain, SOB, palpitations, numbness of  extremities.

## 2018-12-07 NOTE — SUBJECTIVE & OBJECTIVE
"Medications Prior to Admission   Medication Sig Dispense Refill Last Dose    aspirin (ECOTRIN) 81 MG EC tablet Take 81 mg by mouth.   Unknown at Unknown time    dabigatran etexilate (PRADAXA) 150 mg Cap Take 150 mg by mouth once daily. "Do NOT break, chew, or open capsules."   Unknown at Unknown time    furosemide (LASIX) 40 MG tablet Take 40 mg by mouth 2 (two) times daily.   Unknown at Unknown time    glipiZIDE (GLUCOTROL) 10 MG tablet Take 10 mg by mouth.   Unknown at Unknown time    lisinopril (PRINIVIL,ZESTRIL) 40 MG tablet Take 40 mg by mouth.   Unknown at Unknown time    metFORMIN (GLUCOPHAGE) 500 MG tablet Take 500 mg by mouth.   Unknown at Unknown time    metoprolol succinate (TOPROL-XL) 25 MG 24 hr tablet Take 25 mg by mouth once daily.   Unknown at Unknown time    simvastatin (ZOCOR) 20 MG tablet Take 20 mg by mouth.   Unknown at Unknown time     Review of patient's allergies indicates:  No Known Allergies    Past Medical History:   Diagnosis Date    CHF (congestive heart failure)     Coronary artery disease     Diabetes mellitus     Diverticulosis     Hx of CABG     Hypertension      Past Surgical History:   Procedure Laterality Date    APPENDECTOMY      BYPASS GRAFT       Family History     Problem Relation (Age of Onset)    Arthritis Sister    Dementia Mother    No Known Problems Father        Tobacco Use    Smoking status: Former Smoker     Packs/day: 1.00     Years: 25.00     Pack years: 25.00     Types: Cigarettes    Smokeless tobacco: Never Used   Substance and Sexual Activity    Alcohol use: No     Frequency: Never    Drug use: No    Sexual activity: Not Currently     Partners: Female     Review of Systems   Constitutional: Negative for activity change, chills and fever.   HENT: Negative for congestion.    Cardiovascular: Negative for leg swelling.   Gastrointestinal: Negative for abdominal pain.   Musculoskeletal: Negative for back pain.   Neurological: Negative for " dizziness.       Objective:     Vital Signs (Most Recent):  Temp: 97.6 °F (36.4 °C) (12/07/18 0454)  Pulse: (!) 50 (12/07/18 0454)  Resp: 20 (12/07/18 0454)  BP: 124/75 (12/07/18 0454)  SpO2: 97 % (12/07/18 0454) Vital Signs (24h Range):  Temp:  [97.6 °F (36.4 °C)-98.3 °F (36.8 °C)] 97.6 °F (36.4 °C)  Pulse:  [45-68] 50  Resp:  [18-20] 20  SpO2:  [96 %-98 %] 97 %  BP: (101-125)/(53-75) 124/75     Weight: 100.7 kg (221 lb 14.4 oz)  Body mass index is 32.77 kg/m².    Physical Exam   Constitutional: He is oriented to person, place, and time. He appears well-developed and well-nourished. No distress.   HENT:   Head: Normocephalic and atraumatic.   Eyes: No scleral icterus.   Neck: Neck supple.   Cardiovascular: Normal rate.   L- 2+ palpable femoral, popliteal, DP, PT  R- 2+ femoral, 1+ popliteal, non palpable DP, PT   Pulmonary/Chest: Effort normal. No respiratory distress.   Abdominal: Soft.   Musculoskeletal: He exhibits no edema.   Neurological: He is alert and oriented to person, place, and time.   Skin: Skin is warm.         Significant Labs:  ABGs: No results for input(s): PH, PCO2, PO2, HCO3, POCSATURATED, BE in the last 48 hours.  Cardiac markers: No results for input(s): CKMB, CPKMB, TROPONINT, TROPONINI, MYOGLOBIN in the last 48 hours.  CBC:   Recent Labs   Lab 12/07/18 0459   WBC 10.19   RBC 3.71*   HGB 11.0*   HCT 34.4*      MCV 93   MCH 29.6   MCHC 32.0     CMP:   Recent Labs   Lab 12/06/18  1950 12/07/18 0459   * 163*   CALCIUM 10.5 9.7   ALBUMIN 2.9*  --    PROT 8.5*  --     136   K 4.8 4.6   CO2 20* 23    103   BUN 37* 34*   CREATININE 1.6* 1.5*   ALKPHOS 90  --    ALT 37  --    AST 28  --    BILITOT 0.6  --      All pertinent labs from the last 24 hours have been reviewed.    Significant Diagnostics:  I have reviewed and interpreted all pertinent imaging results/findings within the past 24 hours.

## 2018-12-07 NOTE — SUBJECTIVE & OBJECTIVE
Scheduled Meds:   cefTRIAXone (ROCEPHIN) IVPB  2 g Intravenous Q24H    furosemide  40 mg Oral BID    simvastatin  20 mg Oral Daily     Continuous Infusions:   heparin (porcine) in D5W 15 Units/kg/hr (12/07/18 0718)    lactated ringers       PRN Meds:acetaminophen, acetaminophen, acetaminophen, dextrose 50%, dextrose 50%, glucagon (human recombinant), glucose, glucose, heparin (PORCINE), heparin (PORCINE), heparin, porcine (PF), insulin aspart U-100, promethazine (PHENERGAN) IVPB, sodium chloride 0.9%    Review of patient's allergies indicates:  No Known Allergies     Past Medical History:   Diagnosis Date    CHF (congestive heart failure)     Coronary artery disease     Diabetes mellitus     Diverticulosis     Hx of CABG     Hypertension      Past Surgical History:   Procedure Laterality Date    APPENDECTOMY      BYPASS GRAFT         Family History     Problem Relation (Age of Onset)    Arthritis Sister    Dementia Mother    No Known Problems Father        Tobacco Use    Smoking status: Former Smoker     Packs/day: 1.00     Years: 25.00     Pack years: 25.00     Types: Cigarettes    Smokeless tobacco: Never Used   Substance and Sexual Activity    Alcohol use: No     Frequency: Never    Drug use: No    Sexual activity: Not Currently     Partners: Female     Review of Systems   Constitutional: Negative for chills, diaphoresis, fatigue and fever.   Respiratory: Negative for chest tightness and shortness of breath.    Cardiovascular: Positive for leg swelling. Negative for chest pain.   Gastrointestinal: Negative for nausea and vomiting.   Skin: Positive for color change and wound.   Neurological: Positive for numbness.     Objective:     Vital Signs (Most Recent):  Temp: 98.5 °F (36.9 °C) (12/07/18 0843)  Pulse: 63 (12/07/18 0843)  Resp: 18 (12/07/18 0843)  BP: 138/60 (12/07/18 0843)  SpO2: (!) 93 % (12/07/18 0843) Vital Signs (24h Range):  Temp:  [97.6 °F (36.4 °C)-98.5 °F (36.9 °C)] 98.5 °F (36.9  °C)  Pulse:  [45-68] 63  Resp:  [18-20] 18  SpO2:  [93 %-98 %] 93 %  BP: (101-138)/(53-75) 138/60     Weight: 100.7 kg (221 lb 14.4 oz)  Body mass index is 32.77 kg/m².    Foot Exam    Laboratory:  A1C: No results for input(s): HGBA1C in the last 4320 hours.  Blood Cultures:   Recent Labs   Lab 12/06/18 1951   LABBLOO No Growth to date  No Growth to date     BMP:   Recent Labs   Lab 12/07/18 0459   *      K 4.6      CO2 23   BUN 34*   CREATININE 1.5*   CALCIUM 9.7     CBC:   Recent Labs   Lab 12/07/18 0459   WBC 10.19   RBC 3.71*   HGB 11.0*   HCT 34.4*      MCV 93   MCH 29.6   MCHC 32.0     CMP:   Recent Labs   Lab 12/06/18  1950 12/07/18 0459   * 163*   CALCIUM 10.5 9.7   ALBUMIN 2.9*  --    PROT 8.5*  --     136   K 4.8 4.6   CO2 20* 23    103   BUN 37* 34*   CREATININE 1.6* 1.5*   ALKPHOS 90  --    ALT 37  --    AST 28  --    BILITOT 0.6  --      Coagulation:   Recent Labs   Lab 12/06/18  1950  12/07/18 0459   INR 1.2  --   --    APTT  --    < > 30.4    < > = values in this interval not displayed.     CRP: No results for input(s): CRP in the last 168 hours.  ESR:   Recent Labs   Lab 12/07/18 0459   SEDRATE 75*     Prealbumin: No results for input(s): PREALBUMIN in the last 48 hours.  Wound Cultures: No results for input(s): LABAERO in the last 4320 hours.  Microbiology Results (last 7 days)     Procedure Component Value Units Date/Time    Blood culture (site 2) [[id number]] Collected:  12/06/18 1951    Order Status:  Completed Specimen:  Blood Updated:  12/07/18 0515     Blood Culture, Routine No Growth to date    Narrative:       Site # 2, aerobic only    Blood culture (site 1) [[id number]] Collected:  12/06/18 1951    Order Status:  Completed Specimen:  Blood Updated:  12/07/18 0515     Blood Culture, Routine No Growth to date    Narrative:       Site # 1, aerobic and anaerobic        Specimen (12h ago, onward)    None          Diagnostic Results:  X-Ray: I  have reviewed all pertinent results/findings within the past 24 hours.  Ordered    Clinical Findings:    S/p R partial 1st ray amp with bone exposure. Granular base with ischemic appearing wound edges. No active bleeding noted. No purulence or acute SOI noted. No malodor. Stable.

## 2018-12-07 NOTE — ASSESSMENT & PLAN NOTE
· Controlled.   · Resuming home Lisinopril and Lasix, and holding Metoprolol with HR in lower 60's.

## 2018-12-07 NOTE — ASSESSMENT & PLAN NOTE
· Unclear of baseline; no outside records available.   · Could be acute, but patient with history of poorly controlled T2DM.   · BUN 37, Cr 1.6, and GFR 40 on admission.   · Holding home Lisinopril in setting of possible STACY.   · LR maintenance ordered.   · BMP daily.

## 2018-12-07 NOTE — PHARMACY MED REC
"Admission Medication Reconciliation - Pharmacy Consult Note    The home medication history was taken by Sushma Jj, Pharmacy Technician.  Based on information gathered and subsequent review by the clinical pharmacist, the items below may need attention.     You may go to "Admission" then "Reconcile Home Medications" tabs to review and/or act upon these items.     Potentially problematic discrepancies with current MAR  o Patient IS taking the following which was not ordered upon admit  o ASA 81 mg PO daily (held upon admission)  o Patient is taking a DIFFERENT DRUG than that ordered upon admit  o Pravastatin 10 mg PO daily    Please address this information as you see fit.  Feel free to contact us if you have any questions or require assistance.    Rachelle Michael, PharmD  [id number]                    .    .            "

## 2018-12-07 NOTE — ASSESSMENT & PLAN NOTE
· Last EF 40-50% at [facility] per transfer note.   · No signs of fluid overload on exam.   · Managed with Lasix 40 mg BID, Lisinopril 40 mg, and Metoprolol daily.   · Holding Metoprolol temporarily with HR on admission in lower 60's.   · Will order cardiac and diabetic diet.

## 2018-12-07 NOTE — ASSESSMENT & PLAN NOTE
78 y M with h/o 4v bypass (2001), HFrEF (EF 45-50%), T2DM (A1c 8.1), HTN, SANDIE (on CPAP nightly), diverticulosis and newly diagnosed afib. Pt presented with osteomyelitis of R great toe s/p amputation. Wound was left open due to concern for lack of in-line flow to foot. His Cr is elevated at 1.6 so he was transferred here for possible CO2 angiogram. He is on a heparin gtt for newly diagnosed afib  - cont NPO  - IVF  - cont heparin gtt  - f/u ABIs this morning  - pending results of DANILO and Cr this morning, will tentatively plan for angiogram

## 2018-12-07 NOTE — HPI
"  Mr. Godinez is a 78 year old man with DM, CHF, severe PAD who was transferred from the [facility] for vascular surgery evaluation s/p right great toe amputation on 12/3.  Patient reports history of hospitalization in [place] from 11/14 to 11/21 for heart failure exacerbation.  He subsequently developed swelling of the right great toe associated with associated erythema. Per patient, he had had a small wound/callus on the toe for some time.   He cut the skin of his toe with his pocket knife, producing purulent discharge, [facility]  DANLIO showed severe PAD and he underwent right toe amputation on 12/3.  His post-operative course was complicated by loss of blood flow and wound was left open.  Because of kidney disease he was transferred here for CO2 angiography.      There is also concern for osteomyelitis.  Wound cultures (? Bone cultures)  from [facility] showed MSSA and Group G strep.  Blood cultures were negative.  Per his wife, cultures were from bone and ID was consulted.  Initially treated with vancomycin and ertapenem per wife, then transitioned to IV ceftriaxone.    ID is consulted for stop date recommendations for antibiotics.     Denies associated fevers, chills, systemic symptoms.  Complaining of a "cramping" pain in his right foot.   "

## 2018-12-07 NOTE — ASSESSMENT & PLAN NOTE
Pt s/p amputation with exposed bone, no acute SOI noted, stable.   Will discuss possibility further debridement after angiogram performed  ID on board for management of OM, appreciate recs

## 2018-12-07 NOTE — CONSULTS
[facility]  Infectious Disease  Consult Note    Patient Name: Jai Godinez  MRN: [id number]  Admission Date: 12/6/2018  Hospital Length of Stay: 1 days  Attending Physician: Tess Garcia MD  Primary Care Provider: Pepe Valverde MD     Isolation Status: No active isolations      Inpatient consult to Infectious Diseases  Consult performed by: CONSUELO Rand, ANP  Consult ordered by: Miguel Angel Jones MD  Reason for consult: Transfer from [facility] S/P right toe amputation with right foot osteomyelitis; looking for stop date recommendations for antibiotics.          ID Consult acknowledged.   Full consult and recommendations to follow today  In the interim, please call for any immediate concerns.     Thank you.   CONSUELO Valero, ANP-C  [phone] pager,  [id number] [phone]

## 2018-12-07 NOTE — H&P
[facility] Hospital Medicine  History & Physical    Patient Name: Jai Godinez  MRN: [id number]  Admission Date: 12/6/2018  Attending Physician: Tess Garcia MD   Primary Care Provider: Pepe Valverde MD    Hospital Medicine Team: Networked reference to record PCT  Miguel Angel Jones MD     Patient information was obtained from patient and transfer note.     Subjective:     Principal Problem:Amputated great toe of right foot    Chief Complaint: No chief complaint on file.       HPI: This is a 78 year old female transferred from the [facility] for vascular surgery repair of right great toe status post amputation. He has a PMH of quadruple bypass (2001), severe peripheral artery disease, HFrEF (EF 45-50%), T2DM (A1c 8.1), HTN, SANDIE (on CPAP nightly) and diverticulosis.     He was hospitalized in [place] from 11/14 to 11/21 for acute on chronic heart failure exacerbation; discharge home with resolution of symptoms. He then developed one day of progressively worsening swelling of the right great toe associated with erythematous skin changes; for relief, he cut the skin of the toe with his pocket knife, at which point noticed purulent discharge, [facility]     Patient underwent right toe amputation done on 12/3 for pre-operative DANILO indicative of severe PAD. His post-operative course was complicated by development non-pulsatile blood flow after, as a result podiatry has left wound open. Vascular  at [facility] wanted to due angioplasty post-op, but CO2 angiography wasn't available. Also, concern for possible right foot osteomyelitis with wound cultures growing MSSA and Group G Streptococcus; blood cultures negative; ID there treated with Ceftriaxone while in-patient, with plans to discharge on Doxycycline.  In addition, on presentation, he was noted to have new development of Afib with slow ventricular response; rate control with Metoprolol and started on heparin drip while inpatient;  "transitioned to Dabigatran prior to transfer.     Patient/family requested transfer elsewhere to accomplish procedure. [facility] Transfer center contacted today and case was discussed with Dr. Patricia [facility]danaVascular,  who requested patient to transfer to hospital medicine with vascular surgery consults.        On arrival here, he reports episodic cramping pain isolated to the amputation site occurring approximately Q5min. Pain relieved by taking Tylenol PRN. Denies fevers, chills, chest pain, SOB, palpitations, numbness of extremities.         Past Medical History:   Diagnosis Date    CHF (congestive heart failure)     Coronary artery disease     Diabetes mellitus     Diverticulosis     Hx of CABG     Hypertension        Past Surgical History:   Procedure Laterality Date    APPENDECTOMY      BYPASS GRAFT         Review of patient's allergies indicates:  No Known Allergies    No current facility-administered medications on file prior to encounter.      Current Outpatient Medications on File Prior to Encounter   Medication Sig    aspirin (ECOTRIN) 81 MG EC tablet Take 81 mg by mouth.    dabigatran etexilate (PRADAXA) 150 mg Cap Take 150 mg by mouth once daily. "Do NOT break, chew, or open capsules."    furosemide (LASIX) 40 MG tablet Take 40 mg by mouth 2 (two) times daily.    glipiZIDE (GLUCOTROL) 10 MG tablet Take 10 mg by mouth.    lisinopril (PRINIVIL,ZESTRIL) 40 MG tablet Take 40 mg by mouth.    metFORMIN (GLUCOPHAGE) 500 MG tablet Take 500 mg by mouth.    metoprolol succinate (TOPROL-XL) 25 MG 24 hr tablet Take 25 mg by mouth once daily.    simvastatin (ZOCOR) 20 MG tablet Take 20 mg by mouth.    [DISCONTINUED] aspirin (ECOTRIN) 81 MG EC tablet Take 81 mg by mouth once daily.    [DISCONTINUED] glipiZIDE (GLUCOTROL) 10 MG tablet Take 10 mg by mouth 2 (two) times daily before meals.    [DISCONTINUED] metFORMIN (GLUCOPHAGE) 500 MG tablet Take 500 mg by mouth 2 (two) times daily with meals.    [DISCONTINUED] " pravastatin (PRAVACHOL) 10 MG tablet Take 20 mg by mouth once daily.     Family History     Problem Relation (Age of Onset)    Arthritis Sister    Dementia Mother    No Known Problems Father        Tobacco Use    Smoking status: Former Smoker     Packs/day: 1.00     Years: 25.00     Pack years: 25.00     Types: Cigarettes    Smokeless tobacco: Never Used   Substance and Sexual Activity    Alcohol use: No     Frequency: Never    Drug use: No    Sexual activity: Not Currently     Partners: Female     Review of Systems   Constitutional: Negative for appetite change, chills, diaphoresis, fatigue and fever.   HENT: Negative for congestion, sore throat and trouble swallowing.    Eyes: Negative for photophobia, pain and discharge.   Respiratory: Negative for cough, chest tightness and shortness of breath.    Cardiovascular: Negative for chest pain, palpitations and leg swelling.   Gastrointestinal: Negative for abdominal distention, abdominal pain, diarrhea, nausea and vomiting.   Genitourinary: Negative for difficulty urinating and dysuria.   Musculoskeletal: Positive for joint swelling. Negative for back pain, myalgias and neck pain.   Skin: Negative for color change, pallor and rash.   Neurological: Negative for dizziness, light-headedness, numbness and headaches.     Objective:     Vital Signs (Most Recent):  Temp: 97.6 °F (36.4 °C) (12/06/18 1813)  Pulse: (!) 56 (12/06/18 1813)  Resp: 18 (12/06/18 1813)  BP: 125/62 (12/06/18 1813)  SpO2: 96 % (12/06/18 1813) Vital Signs (24h Range):  Temp:  [97.6 °F (36.4 °C)-98.3 °F (36.8 °C)] 97.6 °F (36.4 °C)  Pulse:  [45-59] 56  Resp:  [18] 18  SpO2:  [96 %-97 %] 96 %  BP: (101-125)/(53-62) 125/62        There is no height or weight on file to calculate BMI.    Physical Exam   Constitutional: He is oriented to person, place, and time. He appears well-developed and well-nourished. No distress.   HENT:   Head: Normocephalic and atraumatic.   Mouth/Throat: Uvula is midline,  oropharynx is clear and moist and mucous membranes are normal.   Eyes: Conjunctivae are normal. Pupils are equal, round, and reactive to light.   Neck: Neck supple. No JVD present.   Cardiovascular: Normal rate, normal heart sounds and normal pulses. An irregularly irregular rhythm present.   No murmur heard.  Pulses:       Radial pulses are 2+ on the right side, and 2+ on the left side.        Dorsalis pedis pulses are 2+ on the right side, and 2+ on the left side.   Pulmonary/Chest: Effort normal and breath sounds normal. No respiratory distress.   There are minimal crackles at the posterior lower lobes.    Abdominal: Soft. Normal appearance and bowel sounds are normal. He exhibits no distension. There is no tenderness.   Musculoskeletal:   There is no swelling of the lower extremities. Right foot wrapped in Kerlex.    Neurological: He is alert and oriented to person, place, and time. No cranial nerve deficit. GCS eye subscore is 4. GCS verbal subscore is 5. GCS motor subscore is 6. He displays no Babinski's sign on the left side.   Skin: Skin is warm and dry. No bruising and no rash noted.         CRANIAL NERVES     CN III, IV, VI   Pupils are equal, round, and reactive to light.      Significant Labs: A1C: No results for input(s): HGBA1C in the last 4320 hours.     Blood Culture: No results for input(s): LABBLOO in the last 48 hours.     BMP: No results for input(s): GLU, NA, K, CL, CO2, BUN, CREATININE, CALCIUM, MG in the last 48 hours.     CBC: No results for input(s): WBC, HGB, HCT, PLT in the last 48 hours.    Significant Imaging: I have reviewed all pertinent imaging results/findings within the past 24 hours.    Assessment/Plan:     * Amputated great toe of right foot    · Status post amputation of first toe of right foot on 12/03 at [facility] secondary to either severe PAD with possible super-imposed osteomyelitis.   · [facility] that [facility] course was complicated by development non-pulsatile blood  flow after, as a result podiatry has left wound open. Vascular at [facility] wanted to due angioplasty post-op, but CO2 angiography wasn't available, prompting transfer here.   · Case discussed with Dr. Gomez in Vascular Surgery here at transfer; will consult them.   · Exam unremarkable for signs of diminished blood flow.   · Will make NPO tonight for possible surgery tomorrow morning.   · Will resume heparin drip at low intensity for anticoagulation with new Afib.   · Repeat DANILO's ordered.        Osteomyelitis of right foot    · Concern for possible right foot osteomyelitis with wound cultures growing MSSA and Group G Streptococcus; blood cultures negative; ID there treated with Ceftriaxone while in-patient, with plans to discharge on Doxycycline.  · Unclear if patient had imaging studies suggesting or confirming.   · Afebrile. CBC ordered.   · Blood cultures ordered.   · Resuming Ceftriaxone 2g Q24.        Atrial fibrillation with slow ventricular response    · Irregular irregular rhythm on exam with HR controlled in lower 60's.   · Will hold home Metoprolol for now.   · Started on heparin infusion at [facility] with plans to transition with Dabigatran.   · Will start heparin infusion here with low intensity.        Chronic systolic heart failure    · Last EF 40-50% at [facility] per transfer note.   · No signs of fluid overload on exam.   · Managed with Lasix 40 mg BID, Lisinopril 40 mg, and Metoprolol daily.   · Holding Metoprolol temporarily with HR on admission in lower 60's.   · Will order cardiac and diabetic diet.        Essential hypertension    · Controlled.   · Resuming home Lisinopril and Lasix, and holding Metoprolol with HR in lower 60's.        Type 2 diabetes mellitus, without long-term current use of insulin    · Last A1c 8 at outside facility.   · Holding home Metformin and Glipizide  · Will order low dose sliding scale on admission.        Peripheral arterial disease    See assessment for amputation of great right  toe.          VTE Risk Mitigation (From admission, onward)        Ordered     heparin 25,000 units in dextrose 5% 250 mL (100 units/mL) infusion LOW INTENSITY nomogram - [facility]  Continuous      12/06/18 1937     heparin 25,000 units in dextrose 5% (100 units/ml) IV bolus from bag - ADDITIONAL PRN BOLUS - 30 units/kg  As needed (PRN)      12/06/18 1937     heparin 25,000 units in dextrose 5% (100 units/ml) IV bolus from bag - ADDITIONAL PRN BOLUS - 60 units/kg  As needed (PRN)      12/06/18 1937     IP VTE HIGH RISK PATIENT  Once      12/06/18 1859             Miguel Angel Jones MD  Department of Hospital Medicine   [facility]

## 2018-12-07 NOTE — ASSESSMENT & PLAN NOTE
· Status post amputation of first toe of right foot on 12/03 at [facility] secondary to either severe PAD with possible super-imposed osteomyelitis.   · [facility] was complicated by development non-pulsatile blood flow after, as a result podiatry has left wound open. Vascular at [facility] wanted to due angioplasty post-op, but CO2 angiography wasn't available, prompting transfer here. See vascular surgery and podiatry note for images of wound.   · Case discussed with Dr. Gomez in Vascular Surgery here at transfer. Vascular surgery evaluated; recommended repeating DANILO's and tentatively planning for angiogram today. DANILO's ordered.   · Exam notable for diminished pulses in right foot and lower extremity.   · NPO for possible vascular procedure.   · Will resume heparin drip at low intensity for anticoagulation with new Afib.   · Wound care consulted; deferred care recommendations to podiatry.

## 2018-12-07 NOTE — SUBJECTIVE & OBJECTIVE
"Past Medical History:   Diagnosis Date    CHF (congestive heart failure)     Coronary artery disease     Diabetes mellitus     Diverticulosis     Hx of CABG     Hypertension        Past Surgical History:   Procedure Laterality Date    APPENDECTOMY      BYPASS GRAFT         Review of patient's allergies indicates:  No Known Allergies    Medications:  Medications Prior to Admission   Medication Sig    aspirin (ECOTRIN) 81 MG EC tablet Take 81 mg by mouth.    dabigatran etexilate (PRADAXA) 150 mg Cap Take 150 mg by mouth once daily. "Do NOT break, chew, or open capsules."    furosemide (LASIX) 40 MG tablet Take 40 mg by mouth 2 (two) times daily.    glipiZIDE (GLUCOTROL) 10 MG tablet Take 10 mg by mouth.    lisinopril (PRINIVIL,ZESTRIL) 40 MG tablet Take 40 mg by mouth.    metFORMIN (GLUCOPHAGE) 500 MG tablet Take 500 mg by mouth.    metoprolol succinate (TOPROL-XL) 25 MG 24 hr tablet Take 25 mg by mouth once daily.    simvastatin (ZOCOR) 20 MG tablet Take 20 mg by mouth.     Antibiotics (From admission, onward)    Start     Stop Route Frequency Ordered    12/06/18 2000  cefTRIAXone (ROCEPHIN) 2 g in dextrose 5 % 50 mL IVPB      -- IV Every 24 hours (non-standard times) 12/06/18 1859        Antifungals (From admission, onward)    None        Antivirals (From admission, onward)    None             There is no immunization history on file for this patient.    Family History     Problem Relation (Age of Onset)    Arthritis Sister    Dementia Mother    No Known Problems Father        Social History     Socioeconomic History    Marital status:      Spouse name: Not on file    Number of children: Not on file    Years of education: Not on file    Highest education level: Not on file   Social Needs    Financial resource strain: Not on file    Food insecurity - worry: Not on file    Food insecurity - inability: Not on file    Transportation needs - medical: Not on file    Transportation needs - " non-medical: Not on file   Occupational History    Not on file   Tobacco Use    Smoking status: Former Smoker     Packs/day: 1.00     Years: 25.00     Pack years: 25.00     Types: Cigarettes    Smokeless tobacco: Never Used   Substance and Sexual Activity    Alcohol use: No     Frequency: Never    Drug use: No    Sexual activity: Not Currently     Partners: Female   Other Topics Concern    Not on file   Social History Narrative    Not on file     Review of Systems   Constitutional: Negative for activity change, appetite change, chills, diaphoresis, fatigue and fever.   HENT: Negative.    Eyes: Negative.    Respiratory: Negative for cough, chest tightness and shortness of breath.    Cardiovascular: Positive for leg swelling. Negative for chest pain and palpitations.   Gastrointestinal: Negative for abdominal distention, abdominal pain, diarrhea, nausea and vomiting.   Genitourinary: Negative for difficulty urinating and dysuria.   Musculoskeletal: Positive for joint swelling. Negative for back pain, myalgias and neck pain.        Cramping pain right foot   Skin: Positive for wound. Negative for color change, pallor and rash.   Neurological: Negative for dizziness and headaches.   Hematological: Negative.    Psychiatric/Behavioral: Negative for agitation and confusion.     Objective:     Vital Signs (Most Recent):  Temp: 97.8 °F (36.6 °C) (12/07/18 1203)  Pulse: 60 (12/07/18 1203)  Resp: 17 (12/07/18 1203)  BP: (!) 107/55 (12/07/18 1203)  SpO2: 96 % (12/07/18 1203) Vital Signs (24h Range):  Temp:  [97.6 °F (36.4 °C)-98.5 °F (36.9 °C)] 97.8 °F (36.6 °C)  Pulse:  [45-68] 60  Resp:  [17-20] 17  SpO2:  [93 %-98 %] 96 %  BP: (101-138)/(53-75) 107/55     Weight: 100.7 kg (221 lb 14.4 oz)  Body mass index is 32.77 kg/m².    Estimated Creatinine Clearance: 46.7 mL/min (A) (based on SCr of 1.5 mg/dL (H)).    Physical Exam   Constitutional: He is oriented to person, place, and time. He appears well-developed and  well-nourished. No distress.   HENT:   Head: Normocephalic and atraumatic.   Eyes: Conjunctivae are normal. No scleral icterus.   Neck: Normal range of motion.   Cardiovascular: An irregularly irregular rhythm present. Bradycardia present.   No murmur heard.  Pulmonary/Chest: Effort normal and breath sounds normal. No respiratory distress. He has no wheezes.   CPAP mask   Abdominal: Soft. He exhibits no distension. There is no tenderness.   Musculoskeletal: He exhibits no edema.   Right foot wrapped. C/D/I/   See Podiatry photos below.  Noted to have exposed bone  No ascending erythema   Neurological: He is alert and oriented to person, place, and time.   Skin: Skin is warm and dry. He is not diaphoretic.   Psychiatric: He has a normal mood and affect. His behavior is normal.   Vitals reviewed.          Significant Labs:   Blood Culture:   Recent Labs   Lab 12/06/18 1951   LABBLOO No Growth to date  No Growth to date     CBC:   Recent Labs   Lab 12/06/18  1950 12/07/18 0459   WBC 11.50 10.19   HGB 12.2* 11.0*   HCT 38.6* 34.4*    289     CMP:   Recent Labs   Lab 12/06/18  1950 12/07/18  0459    136   K 4.8 4.6    103   CO2 20* 23   * 163*   BUN 37* 34*   CREATININE 1.6* 1.5*   CALCIUM 10.5 9.7   PROT 8.5*  --    ALBUMIN 2.9*  --    BILITOT 0.6  --    ALKPHOS 90  --    AST 28  --    ALT 37  --    ANIONGAP 16 10   EGFRNONAA 40.4* 43.7*     Wound Culture: No results for input(s): LABAERO in the last 4320 hours.    Significant Imaging: None

## 2018-12-07 NOTE — SUBJECTIVE & OBJECTIVE
Interval History: Patient seen at bedside this morning. He reports sleeping well throughout the night. His foot and toe pain continues to be controlled with Tylenol PRN. He was NPO at midnight for anticipated vascular procedure today. He is urinating well. He has no complaints.     Review of Systems   Constitutional: Negative for appetite change, chills, diaphoresis, fatigue and fever.   HENT: Negative for congestion, sore throat and trouble swallowing.    Eyes: Negative for photophobia, pain and discharge.   Respiratory: Negative for cough, chest tightness and shortness of breath.    Cardiovascular: Negative for chest pain, palpitations and leg swelling.   Gastrointestinal: Negative for abdominal pain, diarrhea, nausea and vomiting.   Genitourinary: Negative for difficulty urinating.   Musculoskeletal: Negative for back pain, myalgias and neck pain.   Skin: Positive for wound. Negative for pallor and rash.   Neurological: Negative for light-headedness, numbness and headaches.     Objective:     Vital Signs (Most Recent):  Temp: 98.5 °F (36.9 °C) (12/07/18 0843)  Pulse: 63 (12/07/18 0843)  Resp: 18 (12/07/18 0843)  BP: 138/60 (12/07/18 0843)  SpO2: (!) 93 % (12/07/18 0843) Vital Signs (24h Range):  Temp:  [97.6 °F (36.4 °C)-98.5 °F (36.9 °C)] 98.5 °F (36.9 °C)  Pulse:  [45-68] 63  Resp:  [18-20] 18  SpO2:  [93 %-98 %] 93 %  BP: (101-138)/(53-75) 138/60     Weight: 100.7 kg (221 lb 14.4 oz)  Body mass index is 32.77 kg/m².    Intake/Output Summary (Last 24 hours) at 12/7/2018 0857  Last data filed at 12/7/2018 0606  Gross per 24 hour   Intake 398.9 ml   Output --   Net 398.9 ml      Physical Exam   Constitutional: He is oriented to person, place, and time. He appears well-developed and well-nourished. No distress.   HENT:   Head: Normocephalic and atraumatic.   Mouth/Throat: Uvula is midline, oropharynx is clear and moist and mucous membranes are normal.   Eyes: Conjunctivae are normal. Pupils are equal, round, and  reactive to light.   Neck: No JVD present.   Cardiovascular: Normal rate and normal heart sounds. An irregularly irregular rhythm present.   No murmur heard.  Pulses:       Radial pulses are 2+ on the right side, and 2+ on the left side.        Dorsalis pedis pulses are 1+ on the right side, and 2+ on the left side.   Pulmonary/Chest: Effort normal and breath sounds normal. No respiratory distress.   Abdominal: Soft. Normal appearance and bowel sounds are normal. He exhibits no distension. There is no tenderness.   Neurological: He is alert and oriented to person, place, and time.   Skin: Skin is warm and dry. No bruising and no rash noted.     Significant Labs:   Blood Culture:   Recent Labs   Lab 12/06/18 1951   LABBLOO No Growth to date  No Growth to date     BMP:   Recent Labs   Lab 12/07/18 0459   *      K 4.6      CO2 23   BUN 34*   CREATININE 1.5*   CALCIUM 9.7     CBC:   Recent Labs   Lab 12/06/18  1950 12/07/18 0459   WBC 11.50 10.19   HGB 12.2* 11.0*   HCT 38.6* 34.4*    289     Coagulation:   Recent Labs   Lab 12/06/18  1950  12/07/18 0459   INR 1.2  --   --    APTT  --    < > 30.4    < > = values in this interval not displayed.     Magnesium: No results for input(s): MG in the last 48 hours.    Significant Imaging: I have reviewed all pertinent imaging results/findings within the past 24 hours.

## 2018-12-07 NOTE — ASSESSMENT & PLAN NOTE
"   78 year old man with DM, CHF, severe PAD who was transferred from the [facility] for vascular surgery evaluation s/p right great toe amputation on 12/3.  Patient reports having longstanding callus right hallux. Denies history of prior diabetic foot wounds.  He developed swelling of the right great toe associated with associated erythema after a hospitalization for CHF in mid November.   He cut the skin of his toe with his pocket knife, producing purulent discharge, [facility]  DANILO showed severe PAD and he underwent right toe amputation on 12/3.   His post-operative course was complicated by loss of blood flow and wound was left open.  Because of kidney disease he was transferred here for CO2 angiography.        Wound cultures (? Bone cultures)  from [facility] showed MSSA and Group G strep.  Blood cultures were negative.  Per his wife, cultures were from bone and ID was [facility] at the [facility].  Initially treated with vancomycin and ertapenem per wife, then transitioned to IV ceftriaxone.    ID is consulted for stop date recommendations for antibiotics.      Denies associated fevers, chills, systemic symptoms.  Afebrile.  No leukocytosis.  Inflammatory markers elevated.  Blood cultures NGTD. Complaining of a "cramping" pain in his right foot.  For angiography today.     Plan/recommendations:  1.  Discontinue ceftriaxone(done)  2.  Start cefazolin 2 grams IV q 8 hours (cr cl 46) (ordered)  3.  [facility] management requesting [facility] from [facility] - need bone cultures sensitivities, pathology, Operative report, ID consult notes please.   4.  Will follow    Patient seen by, and plan discussed with, ID staff  Discussed with Primary Team     "

## 2018-12-07 NOTE — PLAN OF CARE
CM obtained SHAHNAZ from patient and SW faxed the SHAHNAZ to [facility] medical records department [phone]; [phone] [facility]. SW will continue to follow.     2:46 PM  SW spoke with Abdullahi in medical records who reported, SW needed to speak to Ms. Lane. Per Ms. Lane, SW may have to speak with Dragan [phone] ext [phone] in Transfer Center to assist me. Per Dragan, she sent over the patient medical records needed for the transfer, but fax it SW within the hour. SW will continue to follow.     4:10 PM  HANG attempted to follow up with Dr[facility] at [facility], however she left for the day. SW will continue to follow.     Millie Mayer LMSW   - [facility]  Ext.[phone]

## 2018-12-07 NOTE — ASSESSMENT & PLAN NOTE
· Last A1c 8 at outside facility.   · Holding home Metformin and Glipizide  · Will order low dose sliding scale on admission.

## 2018-12-07 NOTE — ASSESSMENT & PLAN NOTE
· Concern for possible right foot osteomyelitis with wound cultures growing MSSA and Group G Streptococcus; blood cultures negative; ID there treated with Ceftriaxone while in-patient, with plans to discharge on Doxycycline.  · Unclear if patient had imaging studies suggesting or confirming.   · Afebrile. No leukocytosis. ESR and CRP added on for today.   · Blood cultures ordered.   · Resuming Ceftriaxone 2g Q24. ID consulted for recommendations regarding correct choice of antibiotic at [facility] and anticipated stop date of course.   · Podiatry consulted; wound cultures and XR of foot ordered. Wound to be dressed with Betadyne and Kerflex for now. Recommending likely wound vac at discharge.

## 2018-12-07 NOTE — HPI
78 y M with h/o 4v bypass (2001), HFrEF (EF 45-50%), T2DM (A1c 8.1), HTN, SANDIE (on CPAP nightly), diverticulosis and newly diagnosed a.fib. Recently hospitalized 11/14 for CHF exacerbation. He noted swelling of his R-great toe after discharge and lanced it at home, with purulent discharge. He then presented to [facility], where he underwent R great toe amputation on 12/3. He reportedly did not have pulsatile flow to the foot so the wound was left open and packed. Wound cultures grew MSSA and Group G Streptococcus; blood cultures negative; he was treated with ceftriaxone while in-patient, with plans to discharge on Doxycycline. Due to elevated C of 1.6r, he was unable to undergo an angiogram so was [place] [facility] [place]. He also has been on heparin for new diagnosis of afib.

## 2018-12-07 NOTE — CONSULTS
Wound care consult received for Transfer from [facility] status post right 1st toe amputation with right foot osteomyelitis with open wound.    78 y M with h/o 4v bypass (2001), HFrEF (EF 45-50%), T2DM (A1c 8.1), HTN, SANDIE (on CPAP nightly), diverticulosis and newly diagnosed a.fib. Recently hospitalized 11/14 for CHF exacerbation. He noted swelling of his R-great toe after discharge and lanced it at home, with purulent discharge. He then presented to [facility], where he underwent R great toe amputation on 12/3. He reportedly did not have pulsatile flow to the foot so the wound was left open and packed. Wound cultures grew MSSA and Group G Streptococcus; blood cultures negative; he was treated with ceftriaxone while in-patient, with plans to discharge on Doxycycline. Due to elevated C of 1.6r, he was unable to undergo an angiogram so was transferred [place] [facility] [place] possible [place]2 angio. He also has been on heparin for new diagnosis of afib.    He is admitted under hospital medicine and has already been seen by vascular surgery. Podiatry has also been consulted for the foot wound. Wound care will defer to podiatry at this time as they seem the more appropriate team to care for the wound. Please reconsult if wound care team is needed for further recommendations. Nursing to continue care.     Darshana ORELLANA, BSN, RN, [facility], [facility]  [id number]

## 2018-12-07 NOTE — ASSESSMENT & PLAN NOTE
Pt with ischemic wound and exposed bone s/p right partial 1st ray amputation performed at [facility] on 12/3/18. No acute SOI noted stable  Vascular sx planning intervention  ABX per ID  Cultures taken, orders placed  Xray ordered, consider MRI if worsens in appearance  Wound packed with betadine soaked 4x4, dressed with kerlix and secured with tape  Would benefit from wound vac, plan to apply wound vac after vasc sx intervention  Pt NWB to forefoot, may ambulate in forefoot offloadking sx shoe found at bedside for short distances and transfers only.   Podiatry will follow

## 2018-12-07 NOTE — HOSPITAL COURSE
12/07: No acute events overnight. Vascular surgery assessed this morning; recommended repeat DANILO and tentative angiogram today. Wound care and podiatry consulted for open wound and right foot osteomyelitis.  Wound care defering to podiatry. Podiatry repeating cultures and XR of foot. ID consulted for antibiotic recs; Ceftriaxone changed to Cefazolin (day #1). LR maintenance started to attempt optimization of kidney function; d/c home Lisinopril. [facility]ing to obtain records from [facility].   12/08: No events overnight. DANILO showing mild PAD. Scheduled for angiogram 12/12. BUN and Cr improved to 29 and 1.4 with maintenance LR; GFR 47. ESR 93, CRP 75. Day #2 of Cefazolin.   12/09: No events overnight. Day #3 of Cefazolin. Renal function same. Tylenol PRN changed to Gabapentin with pain improvement.   12/10: No events overnight. Day #4 of Cefazolin. Renal function same. Evaluated by Vascular; angiogram moved up to tomorrow. NPO at midnight.   12/11: No events overnight. Angiogram scheduled today. Day #5 of Cefazolin.   12/12: Angiogram yesterday concerning for no significant blood flow with collaterals below the right ankle. Vascular recommended hyperbaric oxygen as possible treatment modality, but not likely option given patient's insurance status. Discussed case with podiatry; will speak to vascular and discuss blood flow to area for possible amputation. No events overnight. Day #6 of Cefazolin.   12/13: No events overnight. Podiatry evaluated; planning for [facility] debrid[facility] of [facility], with attempted treatment with IV antibiotics and wound vac, before amputation. PICC line placed today. Home health orders placed for attempted insurance coverage with [facility]; still waiting on answer. Day #7 of Cefazolin; per ID final recommendations will anticipate 6 week course of IV Cefazolin 2 g Q12H with Flagyl 500 mg Q8H with anticipated end date of 01/18/19 based on culture results from [facility].   12/15: No events overnight.  Day #9 of Cefazolin. Pending IV antibiotic approval from [facility]. Heparin GTT discontinued; started on home Pradaxa. Added detemir to aspart insulin coverage. Labs now Q48H.   12/16: No events overnight. Day #10 of Cefazolin; Day #4 of Flagyl.   12/17/2018 No events overnight. Day #11 of Cefazolin, day #5 of Flagyl. Pt with no new complaints, pain well controlled. Anxious to leave pending [facility] approval.  12/18/2018 No events overnight. Day #12 of Cefazolin, Day #6 of flagyl. Home health approved through pt insurance, discharging pending delivery of antibiotics.

## 2018-12-07 NOTE — ASSESSMENT & PLAN NOTE
· Irregular irregular rhythm on exam with HR controlled in lower 60's.   · Will hold home Metoprolol for now.   · Started on heparin infusion at [facility] with plans to transition with Dabigatran.   · INR 1.2 on admission.   · Will start heparin infusion here with low intensity and follow INR until at therapeutic level.

## 2018-12-07 NOTE — ASSESSMENT & PLAN NOTE
· Status post amputation of first toe of right foot on 12/03 at [facility] secondary to either severe PAD with possible super-imposed osteomyelitis.   · [facility] at [facility] that post-op course course was complicated by development non-pulsatile blood flow after, as a result podiatry has left wound open. Vascular at [facility] wanted to due angioplasty post-op, but CO2 angiography wasn't available, prompting transfer here.   · Case discussed with Dr. Gomez in Vascular Surgery here at transfer; will consult them.   · Exam unremarkable for signs of diminished blood flow.   · Will make NPO tonight for possible surgery tomorrow morning.   · Will resume heparin drip at low intensity for anticoagulation with new Afib.   · Repeat DANILO's ordered.

## 2018-12-07 NOTE — ASSESSMENT & PLAN NOTE
· Irregular irregular rhythm on exam with HR controlled in lower 60's.   · Will hold home Metoprolol for now.   · Started on heparin infusion at [facility] with plans to transition with Dabigatran.   · Will start heparin infusion here with low intensity.

## 2018-12-07 NOTE — CONSULTS
"[facility]  Infectious Disease  Consult Note    Patient Name: Jai Godinez  MRN: [id number]  Admission Date: 12/6/2018  Hospital Length of Stay: 1 days  Attending Physician: Tess Garcia MD  Primary Care Provider: Pepe Valverde MD     Isolation Status: No active isolations    Patient information was obtained from patient and spouse/SO.      Consults  Assessment/Plan:     Osteomyelitis of right foot       78 year old man with DM, CHF, severe PAD who was transferred from the [facility] for vascular surgery evaluation s/p right great toe amputation on 12/3.  Patient reports having longstanding callus right hallux. Denies history of prior diabetic foot wounds.  He developed swelling of the right great toe associated with associated erythema after a hospitalization for CHF in mid November.   He cut the skin of his toe with his pocket knife, producing purulent discharge, [facility]  DANILO showed severe PAD and he underwent right toe amputation on 12/3.   His post-operative course was complicated by loss of blood flow and wound was left open.  Because of kidney disease he was transferred here for CO2 angiography.        Wound cultures (? Bone cultures)  from [facility] showed MSSA and Group G strep.  Blood cultures were negative.  Per his wife, cultures were from bone and [facility].  Initially treated with vancomycin and ertapenem per wife, then transitioned to IV ceftriaxone.    ID is consulted for stop date recommendations for antibiotics.      Denies associated fevers, chills, systemic symptoms.  Afebrile.  No leukocytosis.  Inflammatory markers elevated.  Blood cultures NGTD. Complaining of a "cramping" pain in his right foot.  For angiography today.     Plan/recommendations:  1.  Discontinue ceftriaxone(done)  2.  Start cefazolin 2 grams IV q 8 hours (cr cl 46) (ordered)  3.  [facility] - need bone cultures sensitivities, " "pathology, Operative report, ID consult notes please.   4.  Will follow    Patient seen by, and plan discussed with, ID staff  Discussed with Primary Team            Thank you for your consult. I will follow-up with patient. Please contact us if you have any additional questions.    Thank you.   Please call for any questions or concerns.  CONSUELO Valero, ANP-C  [phone]    Subjective:     Principal Problem: Amputated great toe of right foot    HPI:   Mr. Godinez is a 78 year old man with DM, CHF, severe PAD who was transferred from the [facility] for vascular surgery evaluation s/p right great toe amputation on 12/3.  Patient reports history of hospitalization in [place] from 11/14 to 11/21 for heart failure exacerbation.  He subsequently developed swelling of the right great toe associated with associated erythema. Per patient, he had had a small wound/callus on the toe for some time.   He cut the skin of his toe with his pocket knife, producing purulent discharge, [facility]  DANILO showed severe PAD and he underwent right toe amputation on 12/3.  His post-operative course was complicated by loss of blood flow and wound was left open.  Because of kidney disease he was transferred here for CO2 angiography.      There is also concern for osteomyelitis.  Wound cultures (? Bone cultures)  from [facility] showed MSSA and Group G strep.  Blood cultures were negative.  Per his wife, cultures were from bone and ID was consulted.  Initially treated with vancomycin and ertapenem per wife, then transitioned to IV ceftriaxone.    ID is consulted for stop date recommendations for antibiotics.     Denies associated fevers, chills, systemic symptoms.  Complaining of a "cramping" pain in his right foot.     Past Medical History:   Diagnosis Date    CHF (congestive heart failure)     Coronary artery disease     Diabetes mellitus     Diverticulosis     Hx of CABG     Hypertension        Past Surgical History: " "  Procedure Laterality Date    APPENDECTOMY      BYPASS GRAFT         Review of patient's allergies indicates:  No Known Allergies    Medications:  Medications Prior to Admission   Medication Sig    aspirin (ECOTRIN) 81 MG EC tablet Take 81 mg by mouth.    dabigatran etexilate (PRADAXA) 150 mg Cap Take 150 mg by mouth once daily. "Do NOT break, chew, or open capsules."    furosemide (LASIX) 40 MG tablet Take 40 mg by mouth 2 (two) times daily.    glipiZIDE (GLUCOTROL) 10 MG tablet Take 10 mg by mouth.    lisinopril (PRINIVIL,ZESTRIL) 40 MG tablet Take 40 mg by mouth.    metFORMIN (GLUCOPHAGE) 500 MG tablet Take 500 mg by mouth.    metoprolol succinate (TOPROL-XL) 25 MG 24 hr tablet Take 25 mg by mouth once daily.    simvastatin (ZOCOR) 20 MG tablet Take 20 mg by mouth.     Antibiotics (From admission, onward)    Start     Stop Route Frequency Ordered    12/06/18 2000  cefTRIAXone (ROCEPHIN) 2 g in dextrose 5 % 50 mL IVPB      -- IV Every 24 hours (non-standard times) 12/06/18 1859        Antifungals (From admission, onward)    None        Antivirals (From admission, onward)    None             There is no immunization history on file for this patient.    Family History     Problem Relation (Age of Onset)    Arthritis Sister    Dementia Mother    No Known Problems Father        Social History     Socioeconomic History    Marital status:      Spouse name: Not on file    Number of children: Not on file    Years of education: Not on file    Highest education level: Not on file   Social Needs    Financial resource strain: Not on file    Food insecurity - worry: Not on file    Food insecurity - inability: Not on file    Transportation needs - medical: Not on file    Transportation needs - non-medical: Not on file   Occupational History    Not on file   Tobacco Use    Smoking status: Former Smoker     Packs/day: 1.00     Years: 25.00     Pack years: 25.00     Types: Cigarettes    Smokeless " tobacco: Never Used   Substance and Sexual Activity    Alcohol use: No     Frequency: Never    Drug use: No    Sexual activity: Not Currently     Partners: Female   Other Topics Concern    Not on file   Social History Narrative    Not on file     Review of Systems   Constitutional: Negative for activity change, appetite change, chills, diaphoresis, fatigue and fever.   HENT: Negative.    Eyes: Negative.    Respiratory: Negative for cough, chest tightness and shortness of breath.    Cardiovascular: Positive for leg swelling. Negative for chest pain and palpitations.   Gastrointestinal: Negative for abdominal distention, abdominal pain, diarrhea, nausea and vomiting.   Genitourinary: Negative for difficulty urinating and dysuria.   Musculoskeletal: Positive for joint swelling. Negative for back pain, myalgias and neck pain.        Cramping pain right foot   Skin: Positive for wound. Negative for color change, pallor and rash.   Neurological: Negative for dizziness and headaches.   Hematological: Negative.    Psychiatric/Behavioral: Negative for agitation and confusion.     Objective:     Vital Signs (Most Recent):  Temp: 97.8 °F (36.6 °C) (12/07/18 1203)  Pulse: 60 (12/07/18 1203)  Resp: 17 (12/07/18 1203)  BP: (!) 107/55 (12/07/18 1203)  SpO2: 96 % (12/07/18 1203) Vital Signs (24h Range):  Temp:  [97.6 °F (36.4 °C)-98.5 °F (36.9 °C)] 97.8 °F (36.6 °C)  Pulse:  [45-68] 60  Resp:  [17-20] 17  SpO2:  [93 %-98 %] 96 %  BP: (101-138)/(53-75) 107/55     Weight: 100.7 kg (221 lb 14.4 oz)  Body mass index is 32.77 kg/m².    Estimated Creatinine Clearance: 46.7 mL/min (A) (based on SCr of 1.5 mg/dL (H)).    Physical Exam   Constitutional: He is oriented to person, place, and time. He appears well-developed and well-nourished. No distress.   HENT:   Head: Normocephalic and atraumatic.   Eyes: Conjunctivae are normal. No scleral icterus.   Neck: Normal range of motion.   Cardiovascular: An irregularly irregular rhythm  present. Bradycardia present.   No murmur heard.  Pulmonary/Chest: Effort normal and breath sounds normal. No respiratory distress. He has no wheezes.   CPAP mask   Abdominal: Soft. He exhibits no distension. There is no tenderness.   Musculoskeletal: He exhibits no edema.   Right foot wrapped. C/D/I/   See Podiatry photos below.   No ascending erythema   Neurological: He is alert and oriented to person, place, and time.   Skin: Skin is warm and dry. He is not diaphoretic.   Psychiatric: He has a normal mood and affect. His behavior is normal.   Vitals reviewed.      Significant Labs:   Blood Culture:   Recent Labs   Lab 12/06/18 1951   LABBLOO No Growth to date  No Growth to date     CBC:   Recent Labs   Lab 12/06/18 1950 12/07/18 0459   WBC 11.50 10.19   HGB 12.2* 11.0*   HCT 38.6* 34.4*    289     CMP:   Recent Labs   Lab 12/06/18 1950 12/07/18 0459    136   K 4.8 4.6    103   CO2 20* 23   * 163*   BUN 37* 34*   CREATININE 1.6* 1.5*   CALCIUM 10.5 9.7   PROT 8.5*  --    ALBUMIN 2.9*  --    BILITOT 0.6  --    ALKPHOS 90  --    AST 28  --    ALT 37  --    ANIONGAP 16 10   EGFRNONAA 40.4* 43.7*     Wound Culture: No results for input(s): LABAERO in the last 4320 hours.    Significant Imaging: None

## 2018-12-07 NOTE — PLAN OF CARE
CM met with patient and spouse to obtain discharge planning assessment.  Planned discharge is home with family - Plan (A) and (B).    PCP:  Pepe Valverde MD     Payor:  Payor: [employer]ARE / Plan: [employer] CHOICES 65 / Product Type: Medicare Advantage /      Pharmacy:    [id number] - [place] - [address]  [place] [address]  Phone: [phone] Fax: [phone]      Pharmacy:     12/07/18 1246   Discharge Assessment   Assessment Type Discharge Planning Assessment   Confirmed/corrected address and phone number on facesheet? Yes   Assessment information obtained from? Patient   Communicated expected length of stay with patient/caregiver no   Prior to hospitilization cognitive status: Alert/Oriented   Prior to hospitalization functional status: Independent   Current cognitive status: Alert/Oriented   Current Functional Status: Independent   Lives With spouse   Able to Return to Prior Arrangements yes   Is patient able to care for self after discharge? Yes   Who are your caregiver(s) and their phone number(s)? Sheela - spouse [phone]   Patient's perception of discharge disposition home or selfcare   Readmission Within The Last 30 Days no previous admission in last 30 days   Patient currently being followed by outpatient case management? No   Patient currently receives any other outside agency services? No   Equipment Currently Used at Home none   Do you have any problems affording any of your prescribed medications? No   Is the patient taking medications as prescribed? yes   Does the patient have transportation home? Yes   Transportation Available family or friend will provide   Does the patient receive services at the [facility]? No   Discharge Plan A Home with family   Discharge Plan B Home with family   Patient/Family In Agreement With Plan yes

## 2018-12-07 NOTE — CONSULTS
"[facility]  Vascular Surgery  Consult Note    Inpatient consult to Vascular Surgery  Consult performed by: Rachelle Tello MD  Consult ordered by: Miguel Angel Jones MD        Subjective:     Chief Complaint/Reason for Admission: PAD    History of Present Illness: 78 y M with h/o 4v bypass (2001), HFrEF (EF 45-50%), T2DM (A1c 8.1), HTN, SANDIE (on CPAP nightly), diverticulosis and newly diagnosed a.fib. Recently hospitalized 11/14 for CHF exacerbation. He noted swelling of his R-great toe after discharge and lanced it at home, with purulent discharge. He then presented to [facility], where he underwent R great toe amputation on 12/3. He reportedly did not have pulsatile flow to the foot so the wound was left open and packed. Wound cultures grew MSSA and Group G Streptococcus; blood cultures negative; he was treated with ceftriaxone while in-patient, with plans to discharge on Doxycycline. Due to elevated C of 1.6r, he was unable to undergo an angiogram so was transferred to [facility] for possible CO2 angio. He also has been on heparin for new diagnosis of afib.    Medications Prior to Admission   Medication Sig Dispense Refill Last Dose    aspirin (ECOTRIN) 81 MG EC tablet Take 81 mg by mouth.   Unknown at Unknown time    dabigatran etexilate (PRADAXA) 150 mg Cap Take 150 mg by mouth once daily. "Do NOT break, chew, or open capsules."   Unknown at Unknown time    furosemide (LASIX) 40 MG tablet Take 40 mg by mouth 2 (two) times daily.   Unknown at Unknown time    glipiZIDE (GLUCOTROL) 10 MG tablet Take 10 mg by mouth.   Unknown at Unknown time    lisinopril (PRINIVIL,ZESTRIL) 40 MG tablet Take 40 mg by mouth.   Unknown at Unknown time    metFORMIN (GLUCOPHAGE) 500 MG tablet Take 500 mg by mouth.   Unknown at Unknown time    metoprolol succinate (TOPROL-XL) 25 MG 24 hr tablet Take 25 mg by mouth once daily.   Unknown at Unknown time    simvastatin (ZOCOR) 20 MG tablet Take 20 mg by mouth.   " Unknown at Unknown time     Review of patient's allergies indicates:  No Known Allergies    Past Medical History:   Diagnosis Date    CHF (congestive heart failure)     Coronary artery disease     Diabetes mellitus     Diverticulosis     Hx of CABG     Hypertension      Past Surgical History:   Procedure Laterality Date    APPENDECTOMY      BYPASS GRAFT       Family History     Problem Relation (Age of Onset)    Arthritis Sister    Dementia Mother    No Known Problems Father        Tobacco Use    Smoking status: Former Smoker     Packs/day: 1.00     Years: 25.00     Pack years: 25.00     Types: Cigarettes    Smokeless tobacco: Never Used   Substance and Sexual Activity    Alcohol use: No     Frequency: Never    Drug use: No    Sexual activity: Not Currently     Partners: Female     Review of Systems   Constitutional: Negative for activity change, chills and fever.   HENT: Negative for congestion.    Cardiovascular: Negative for leg swelling.   Gastrointestinal: Negative for abdominal pain.   Musculoskeletal: Negative for back pain.   Neurological: Negative for dizziness.       Objective:     Vital Signs (Most Recent):  Temp: 97.6 °F (36.4 °C) (12/07/18 0454)  Pulse: (!) 50 (12/07/18 0454)  Resp: 20 (12/07/18 0454)  BP: 124/75 (12/07/18 0454)  SpO2: 97 % (12/07/18 0454) Vital Signs (24h Range):  Temp:  [97.6 °F (36.4 °C)-98.3 °F (36.8 °C)] 97.6 °F (36.4 °C)  Pulse:  [45-68] 50  Resp:  [18-20] 20  SpO2:  [96 %-98 %] 97 %  BP: (101-125)/(53-75) 124/75     Weight: 100.7 kg (221 lb 14.4 oz)  Body mass index is 32.77 kg/m².    Physical Exam   Constitutional: He is oriented to person, place, and time. He appears well-developed and well-nourished. No distress.   HENT:   Head: Normocephalic and atraumatic.   Eyes: No scleral icterus.   Neck: Neck supple.   Cardiovascular: Normal rate.   L- 2+ palpable femoral, popliteal, DP, PT  R- 2+ femoral, 1+ popliteal, non palpable DP, PT   Pulmonary/Chest: Effort normal.  No respiratory distress.   Abdominal: Soft.   Musculoskeletal: He exhibits no edema.   Neurological: He is alert and oriented to person, place, and time.   Skin: Skin is warm.         Significant Labs:  ABGs: No results for input(s): PH, PCO2, PO2, HCO3, POCSATURATED, BE in the last 48 hours.  Cardiac markers: No results for input(s): CKMB, CPKMB, TROPONINT, TROPONINI, MYOGLOBIN in the last 48 hours.  CBC:   Recent Labs   Lab 12/07/18  0459   WBC 10.19   RBC 3.71*   HGB 11.0*   HCT 34.4*      MCV 93   MCH 29.6   MCHC 32.0     CMP:   Recent Labs   Lab 12/06/18  1950 12/07/18  0459   * 163*   CALCIUM 10.5 9.7   ALBUMIN 2.9*  --    PROT 8.5*  --     136   K 4.8 4.6   CO2 20* 23    103   BUN 37* 34*   CREATININE 1.6* 1.5*   ALKPHOS 90  --    ALT 37  --    AST 28  --    BILITOT 0.6  --      All pertinent labs from the last 24 hours have been reviewed.    Significant Diagnostics:  I have reviewed and interpreted all pertinent imaging results/findings within the past 24 hours.    Assessment/Plan:     * Amputated great toe of right foot    78 y M with h/o 4v bypass (2001), HFrEF (EF 45-50%), T2DM (A1c 8.1), HTN, SANDIE (on CPAP nightly), diverticulosis and newly diagnosed afib. Pt presented with osteomyelitis of R great toe s/p amputation. Wound was left open due to concern for lack of in-line flow to foot. His Cr is elevated at 1.6 so he was transferred here for possible CO2 angiogram. He is on a heparin gtt for newly diagnosed afib  - cont NPO  - IVF  - cont heparin gtt  - f/u ABIs this morning  - pending results of DANILO and Cr this morning, will tentatively plan for angiogram       Thank you for your consult. I will follow-up with patient. Please contact us if you have any additional questions.    Rachelle Patrick MD  Vascular Surgery  [facility]    Vascular Attending  Agree with above  79 yo man with h/o DM, CAD (CABG x5v many years ago), CHF with diastolic dysfunction though no  previous admissions for CHF, [facility] 1st toe open Ray amputation  On exam, no AAA palpable  2+ femorals, popliteal pulses x2  No palpable pedal pulses on R foot; ? Trace L DP    Carotid u/s this AM:  Occluded R ICA  L ICA - <39% stenosis    Cont to optimize renal function  DAPT - start plavix  Cont ASA 81 po qd  Statin rx  Iv Abx  Will plan for R leg angio - with CO2 if Cr still high - next Wed 12/12/18 to get in-line flow to R foot    Rene Gomez MD FACS

## 2018-12-07 NOTE — CONSULTS
[facility]  Podiatry  Consult Note    Patient Name: Jai Godinez  MRN: [id number]  Admission Date: 12/6/2018  Hospital Length of Stay: 1 days  Attending Physician: Tess Garcia MD  Primary Care Provider: Pepe Valverde MD     Inpatient consult to Podiatry  Consult performed by: Dorita Greenberg MD  Consult ordered by: Miguel Angel Jones MD        Subjective:     History of Present Illness:  Pt is a 77 y/o male  has a past medical history of CHF (congestive heart failure), Coronary artery disease, Diabetes mellitus, Diverticulosis, CABG, and Hypertension. Admitted from [facility] for vascular intervention and is s/p right 1st toe amputation. Seen with family at bedside. Family is not sure if pt [facility], do not have paperwork for it. Pt relates to cramping pain. No other pedal complaints at this time.     Scheduled Meds:   cefTRIAXone (ROCEPHIN) IVPB  2 g Intravenous Q24H    furosemide  40 mg Oral BID    simvastatin  20 mg Oral Daily     Continuous Infusions:   heparin (porcine) in D5W 15 Units/kg/hr (12/07/18 0718)    lactated ringers       PRN Meds:acetaminophen, acetaminophen, acetaminophen, dextrose 50%, dextrose 50%, glucagon (human recombinant), glucose, glucose, heparin (PORCINE), heparin (PORCINE), heparin, porcine (PF), insulin aspart U-100, promethazine (PHENERGAN) IVPB, sodium chloride 0.9%    Review of patient's allergies indicates:  No Known Allergies     Past Medical History:   Diagnosis Date    CHF (congestive heart failure)     Coronary artery disease     Diabetes mellitus     Diverticulosis     Hx of CABG     Hypertension      Past Surgical History:   Procedure Laterality Date    APPENDECTOMY      BYPASS GRAFT         Family History     Problem Relation (Age of Onset)    Arthritis Sister    Dementia Mother    No Known Problems Father        Tobacco Use    Smoking status: Former Smoker     Packs/day: 1.00     Years: 25.00     Pack  years: 25.00     Types: Cigarettes    Smokeless tobacco: Never Used   Substance and Sexual Activity    Alcohol use: No     Frequency: Never    Drug use: No    Sexual activity: Not Currently     Partners: Female     Review of Systems   Constitutional: Negative for chills, diaphoresis, fatigue and fever.   Respiratory: Negative for chest tightness and shortness of breath.    Cardiovascular: Positive for leg swelling. Negative for chest pain.   Gastrointestinal: Negative for nausea and vomiting.   Skin: Positive for color change and wound.   Neurological: Positive for numbness.     Objective:     Vital Signs (Most Recent):  Temp: 98.5 °F (36.9 °C) (12/07/18 0843)  Pulse: 63 (12/07/18 0843)  Resp: 18 (12/07/18 0843)  BP: 138/60 (12/07/18 0843)  SpO2: (!) 93 % (12/07/18 0843) Vital Signs (24h Range):  Temp:  [97.6 °F (36.4 °C)-98.5 °F (36.9 °C)] 98.5 °F (36.9 °C)  Pulse:  [45-68] 63  Resp:  [18-20] 18  SpO2:  [93 %-98 %] 93 %  BP: (101-138)/(53-75) 138/60     Weight: 100.7 kg (221 lb 14.4 oz)  Body mass index is 32.77 kg/m².    Foot Exam    Laboratory:  A1C: No results for input(s): HGBA1C in the last 4320 hours.  Blood Cultures:   Recent Labs   Lab 12/06/18 1951   LABBLOO No Growth to date  No Growth to date     BMP:   Recent Labs   Lab 12/07/18 0459   *      K 4.6      CO2 23   BUN 34*   CREATININE 1.5*   CALCIUM 9.7     CBC:   Recent Labs   Lab 12/07/18 0459   WBC 10.19   RBC 3.71*   HGB 11.0*   HCT 34.4*      MCV 93   MCH 29.6   MCHC 32.0     CMP:   Recent Labs   Lab 12/06/18  1950 12/07/18 0459   * 163*   CALCIUM 10.5 9.7   ALBUMIN 2.9*  --    PROT 8.5*  --     136   K 4.8 4.6   CO2 20* 23    103   BUN 37* 34*   CREATININE 1.6* 1.5*   ALKPHOS 90  --    ALT 37  --    AST 28  --    BILITOT 0.6  --      Coagulation:   Recent Labs   Lab 12/06/18  1950  12/07/18  0459   INR 1.2  --   --    APTT  --    < > 30.4    < > = values in this interval not displayed.     CRP:  No results for input(s): CRP in the last 168 hours.  ESR:   Recent Labs   Lab 12/07/18  0459   SEDRATE 75*     Prealbumin: No results for input(s): PREALBUMIN in the last 48 hours.  Wound Cultures: No results for input(s): LABAERO in the last 4320 hours.  Microbiology Results (last 7 days)     Procedure Component Value Units Date/Time    Blood culture (site 2) [[id number]] Collected:  12/06/18 1951    Order Status:  Completed Specimen:  Blood Updated:  12/07/18 0515     Blood Culture, Routine No Growth to date    Narrative:       Site # 2, aerobic only    Blood culture (site 1) [[id number]] Collected:  12/06/18 1951    Order Status:  Completed Specimen:  Blood Updated:  12/07/18 0515     Blood Culture, Routine No Growth to date    Narrative:       Site # 1, aerobic and anaerobic        Specimen (12h ago, onward)    None          Diagnostic Results:  X-Ray: I have reviewed all pertinent results/findings within the past 24 hours.  Ordered    Clinical Findings:    S/p R partial 1st ray amp with bone exposure. Granular base with ischemic appearing wound edges. No active bleeding noted. No purulence or acute SOI noted. No malodor. Stable.           Assessment/Plan:     * Amputated great toe of right foot    Pt with ischemic wound and exposed bone s/p right partial 1st ray amputation performed at [facility] on 12/3/18. No acute SOI noted stable  Vascular sx planning intervention  ABX per ID  Cultures taken, orders placed  Xray ordered, consider MRI if worsens in appearance  Wound packed with betadine soaked 4x4, dressed with kerlix and secured with tape  Would benefit from wound vac, plan to apply wound vac after vasc sx intervention  Pt NWB to forefoot, may ambulate in forefoot offloadking sx shoe found at bedside for short distances and transfers only.   Podiatry will follow        Stage 3 chronic kidney disease    Per primary     Osteomyelitis of right foot    Pt s/p amputation with exposed bone, no acute SOI noted, stable.    Will discuss possibility further debridement after angiogram performed  ID on board for management of OM, appreciate recs     Type 2 diabetes mellitus, without long-term current use of insulin    Per primary     Peripheral arterial disease    Vascular sx on board, planning for angiogram         Thank you for your consult. I will follow-up with patient. Please contact us if you have any additional questions.    Dorita Greenberg MD  Podiatry  [facility]

## 2018-12-07 NOTE — ASSESSMENT & PLAN NOTE
· Concern for possible right foot osteomyelitis with wound cultures growing MSSA and Group G Streptococcus; blood cultures negative; ID there treated with Ceftriaxone while in-patient, with plans to discharge on Doxycycline.  · Unclear if patient had imaging studies suggesting or confirming.   · Afebrile. CBC ordered.   · Blood cultures ordered.   · Resuming Ceftriaxone 2g Q24.

## 2018-12-07 NOTE — NURSING
[facility], [facility]G Right Wrist started today, first set of vitals were taken and the primary medical team is currently seeing pt at this time at bedside.

## 2018-12-07 NOTE — ASSESSMENT & PLAN NOTE
· Last EF 40-50% at [facility] per transfer note.   · No signs of fluid overload on exam.   · Managed with Lasix 40 mg BID, Lisinopril 40 mg, and Metoprolol daily.   · Holding Metoprolol temporarily with HR on admission in lower 60's and Lisinopril with possible STACY.   · Will order cardiac and diabetic diet.

## 2018-12-07 NOTE — HPI
Pt is a 79 y/o male  has a past medical history of CHF (congestive heart failure), Coronary artery disease, Diabetes mellitus, Diverticulosis, CABG, and Hypertension. Admitted from [facility] for vascular intervention and is s/p right 1st toe amputation. Seen with family at bedside. Family is not sure if [facility], do not have paperwork for it. Pt relates to cramping pain. No other pedal complaints at this time.

## 2018-12-08 PROBLEM — S91.301A WOUND OF RIGHT FOOT: Status: ACTIVE | Noted: 2018-12-08

## 2018-12-08 LAB
ANION GAP SERPL CALC-SCNC: 10 MMOL/L
APTT BLDCRRT: 34.4 SEC
APTT BLDCRRT: 45.6 SEC
APTT BLDCRRT: 52.9 SEC
BASOPHILS # BLD AUTO: 0.08 K/UL
BASOPHILS NFR BLD: 0.8 %
BUN SERPL-MCNC: 29 MG/DL
CALCIUM SERPL-MCNC: 9.6 MG/DL
CHLORIDE SERPL-SCNC: 102 MMOL/L
CO2 SERPL-SCNC: 24 MMOL/L
CREAT SERPL-MCNC: 1.4 MG/DL
DIFFERENTIAL METHOD: ABNORMAL
EOSINOPHIL # BLD AUTO: 0.5 K/UL
EOSINOPHIL NFR BLD: 4.8 %
ERYTHROCYTE [DISTWIDTH] IN BLOOD BY AUTOMATED COUNT: 13.4 %
EST. GFR  (AFRICAN AMERICAN): 54.9 ML/MIN/1.73 M^2
EST. GFR  (NON AFRICAN AMERICAN): 47.4 ML/MIN/1.73 M^2
GLUCOSE SERPL-MCNC: 159 MG/DL
HCT VFR BLD AUTO: 34.8 %
HGB BLD-MCNC: 11 G/DL
IMM GRANULOCYTES # BLD AUTO: 0.06 K/UL
IMM GRANULOCYTES NFR BLD AUTO: 0.6 %
LYMPHOCYTES # BLD AUTO: 1.9 K/UL
LYMPHOCYTES NFR BLD: 19.4 %
MCH RBC QN AUTO: 28.7 PG
MCHC RBC AUTO-ENTMCNC: 31.6 G/DL
MCV RBC AUTO: 91 FL
MONOCYTES # BLD AUTO: 1 K/UL
MONOCYTES NFR BLD: 10.4 %
NEUTROPHILS # BLD AUTO: 6.2 K/UL
NEUTROPHILS NFR BLD: 64 %
NRBC BLD-RTO: 0 /100 WBC
PLATELET # BLD AUTO: 266 K/UL
PMV BLD AUTO: 10.8 FL
POCT GLUCOSE: 182 MG/DL (ref 70–110)
POCT GLUCOSE: 186 MG/DL (ref 70–110)
POCT GLUCOSE: 236 MG/DL (ref 70–110)
POCT GLUCOSE: 256 MG/DL (ref 70–110)
POTASSIUM SERPL-SCNC: 4.4 MMOL/L
RBC # BLD AUTO: 3.83 M/UL
SODIUM SERPL-SCNC: 136 MMOL/L
WBC # BLD AUTO: 9.63 K/UL

## 2018-12-08 PROCEDURE — 80048 BASIC METABOLIC PNL TOTAL CA: CPT

## 2018-12-08 PROCEDURE — 11000001 HC ACUTE MED/SURG PRIVATE ROOM

## 2018-12-08 PROCEDURE — 25000003 PHARM REV CODE 250: Performed by: STUDENT IN AN ORGANIZED HEALTH CARE EDUCATION/TRAINING PROGRAM

## 2018-12-08 PROCEDURE — 63600175 PHARM REV CODE 636 W HCPCS: Performed by: NURSE PRACTITIONER

## 2018-12-08 PROCEDURE — 99232 SBSQ HOSP IP/OBS MODERATE 35: CPT | Mod: ,,, | Performed by: NURSE PRACTITIONER

## 2018-12-08 PROCEDURE — 99233 SBSQ HOSP IP/OBS HIGH 50: CPT | Mod: GC,,, | Performed by: STUDENT IN AN ORGANIZED HEALTH CARE EDUCATION/TRAINING PROGRAM

## 2018-12-08 PROCEDURE — 85730 THROMBOPLASTIN TIME PARTIAL: CPT | Mod: 91

## 2018-12-08 PROCEDURE — 85025 COMPLETE CBC W/AUTO DIFF WBC: CPT

## 2018-12-08 PROCEDURE — 85730 THROMBOPLASTIN TIME PARTIAL: CPT

## 2018-12-08 PROCEDURE — 99900035 HC TECH TIME PER 15 MIN (STAT)

## 2018-12-08 PROCEDURE — 63600175 PHARM REV CODE 636 W HCPCS: Performed by: STUDENT IN AN ORGANIZED HEALTH CARE EDUCATION/TRAINING PROGRAM

## 2018-12-08 PROCEDURE — 36415 COLL VENOUS BLD VENIPUNCTURE: CPT

## 2018-12-08 RX ORDER — GABAPENTIN 300 MG/1
600 CAPSULE ORAL ONCE
Status: COMPLETED | OUTPATIENT
Start: 2018-12-08 | End: 2018-12-08

## 2018-12-08 RX ADMIN — CEFAZOLIN 2 G: 330 INJECTION, POWDER, FOR SOLUTION INTRAMUSCULAR; INTRAVENOUS at 05:12

## 2018-12-08 RX ADMIN — FUROSEMIDE 40 MG: 40 TABLET ORAL at 08:12

## 2018-12-08 RX ADMIN — GABAPENTIN 600 MG: 300 CAPSULE ORAL at 08:12

## 2018-12-08 RX ADMIN — FUROSEMIDE 40 MG: 40 TABLET ORAL at 09:12

## 2018-12-08 RX ADMIN — HEPARIN SODIUM AND DEXTROSE 19 UNITS/KG/HR: 10000; 5 INJECTION INTRAVENOUS at 07:12

## 2018-12-08 RX ADMIN — ACETAMINOPHEN 650 MG: 325 TABLET ORAL at 09:12

## 2018-12-08 RX ADMIN — INSULIN ASPART 2 UNITS: 100 INJECTION, SOLUTION INTRAVENOUS; SUBCUTANEOUS at 02:12

## 2018-12-08 RX ADMIN — HEPARIN SODIUM AND DEXTROSE 19 UNITS/KG/HR: 10000; 5 INJECTION INTRAVENOUS at 02:12

## 2018-12-08 RX ADMIN — ACETAMINOPHEN 650 MG: 325 TABLET ORAL at 01:12

## 2018-12-08 RX ADMIN — CEFAZOLIN 2 G: 330 INJECTION, POWDER, FOR SOLUTION INTRAMUSCULAR; INTRAVENOUS at 01:12

## 2018-12-08 RX ADMIN — PRAVASTATIN SODIUM 10 MG: 10 TABLET ORAL at 08:12

## 2018-12-08 RX ADMIN — INSULIN ASPART 1 UNITS: 100 INJECTION, SOLUTION INTRAVENOUS; SUBCUTANEOUS at 11:12

## 2018-12-08 RX ADMIN — CEFAZOLIN 2 G: 330 INJECTION, POWDER, FOR SOLUTION INTRAMUSCULAR; INTRAVENOUS at 09:12

## 2018-12-08 RX ADMIN — ACETAMINOPHEN 1000 MG: 500 TABLET, FILM COATED ORAL at 05:12

## 2018-12-08 NOTE — ASSESSMENT & PLAN NOTE
· Last EF 40-50% at [facility] per transfer note.   · No signs of fluid overload on exam.   · Managed with Lasix 40 mg BID, Lisinopril 40 mg, and Metoprolol daily.   · Holding Metoprolol temporarily with HR on admission in lower 60's and Lisinopril with possible STACY.  Lisinopril should be changed to ARB on discharge given history of chronic dry cough and worsening systemic rash after Lisinopril dose was increased some months ago.  · Will order cardiac and diabetic diet.

## 2018-12-08 NOTE — SUBJECTIVE & OBJECTIVE
Interval History: No acute events overnight.  Afebrile, no leukocytosis.   Angiography planned for Wednesday.   [facility].   On IV cefazolin.     Review of Systems   Constitutional: Negative for activity change, appetite change, chills, diaphoresis, fatigue and fever.   HENT: Negative.    Eyes: Negative.    Respiratory: Negative for cough, chest tightness and shortness of breath.    Cardiovascular: Positive for leg swelling. Negative for chest pain and palpitations.   Gastrointestinal: Negative for abdominal distention, abdominal pain, diarrhea, nausea and vomiting.   Genitourinary: Negative for difficulty urinating and dysuria.   Musculoskeletal: Positive for joint swelling. Negative for back pain, myalgias and neck pain.        Cramping pain right foot   Skin: Positive for wound. Negative for color change, pallor and rash.   Neurological: Negative for dizziness and headaches.   Hematological: Negative.    Psychiatric/Behavioral: Negative for agitation and confusion.     Objective:     Vital Signs (Most Recent):  Temp: 98.8 °F (37.1 °C) (12/08/18 0849)  Pulse: 75 (12/08/18 0849)  Resp: 20 (12/08/18 0849)  BP: 120/69 (12/08/18 0849)  SpO2: 99 % (12/08/18 0849) Vital Signs (24h Range):  Temp:  [97.6 °F (36.4 °C)-98.8 °F (37.1 °C)] 98.8 °F (37.1 °C)  Pulse:  [60-75] 75  Resp:  [17-20] 20  SpO2:  [95 %-99 %] 99 %  BP: (107-136)/(55-69) 120/69     Weight: 104 kg (229 lb 3.2 oz)  Body mass index is 33.85 kg/m².    Estimated Creatinine Clearance: 50.8 mL/min (based on SCr of 1.4 mg/dL).    Physical Exam   Constitutional: He is oriented to person, place, and time. He appears well-developed and well-nourished. No distress.   HENT:   Head: Normocephalic and atraumatic.   Eyes: Conjunctivae are normal. No scleral icterus.   Neck: Normal range of motion.   Cardiovascular: Normal rate. An irregularly irregular rhythm present.   No murmur heard.  Pulmonary/Chest: Effort normal and breath sounds normal. No  respiratory distress. He has no wheezes.   Abdominal: Soft. He exhibits no distension. There is no tenderness.   Musculoskeletal: He exhibits no edema.   Right foot wrapped.  Old drainage noted.   See Podiatry Photos.  Noted to have exposed bone  No ascending erythema   Neurological: He is alert and oriented to person, place, and time.   Skin: Skin is warm and dry. He is not diaphoretic.   Psychiatric: He has a normal mood and affect. His behavior is normal.   Vitals reviewed.      Significant Labs:   Blood Culture:   Recent Labs   Lab 12/06/18 1951   LABBLOO No Growth to date  No Growth to date  No Growth to date  No Growth to date     CBC:   Recent Labs   Lab 12/06/18  1950 12/07/18 0459 12/08/18 0456   WBC 11.50 10.19 9.63   HGB 12.2* 11.0* 11.0*   HCT 38.6* 34.4* 34.8*    289 266     CMP:   Recent Labs   Lab 12/06/18  1950 12/07/18 0459 12/08/18 0456    136 136   K 4.8 4.6 4.4    103 102   CO2 20* 23 24   * 163* 159*   BUN 37* 34* 29*   CREATININE 1.6* 1.5* 1.4   CALCIUM 10.5 9.7 9.6   PROT 8.5*  --   --    ALBUMIN 2.9*  --   --    BILITOT 0.6  --   --    ALKPHOS 90  --   --    AST 28  --   --    ALT 37  --   --    ANIONGAP 16 10 10   EGFRNONAA 40.4* 43.7* 47.4*     Urine Culture: No results for input(s): LABURIN in the last 4320 hours.  Urine Studies:   Recent Labs   Lab 12/07/18 1952   COLORU Yellow   APPEARANCEUA Clear   PHUR 5.0   SPECGRAV 1.015   PROTEINUA Negative   GLUCUA Negative   KETONESU Negative   BILIRUBINUA Negative   OCCULTUA Negative   NITRITE Negative   LEUKOCYTESUR Negative     Wound Culture:   Recent Labs   Lab 12/07/18 1122   LABAERO No growth       Significant Imaging: I have reviewed all pertinent imaging results/findings within the past 24 hours.

## 2018-12-08 NOTE — ASSESSMENT & PLAN NOTE
78 year old man with DM, CHF, severe PAD who was transferred from the [facility] for vascular surgery evaluation s/p right great toe amputation on 12/3.  Patient reports having longstanding callus right hallux. Denies history of prior diabetic foot wounds.  He developed swelling of the right great toe associated with associated erythema after a hospitalization for CHF in mid November.   He cut the skin of his toe with his pocket knife, producing purulent discharge, [facility].  [facility] showed [facility] and he underwent right great toe amputation on 12/3.   His post-operative course was complicated by loss of blood flow and wound was left open.  Because of kidney disease he was transferred here for CO2 angiography.        Wound cultures (? Bone cultures)  from [facility] showed MSSA and Group G strep.  Blood cultures were negative.  Per his wife, cultures were from bone and ID was consulted at the [facility].  Initially treated with vancomycin and ertapenem per wife, then transitioned to IV ceftriaxone.    ID is consulted for stop date recommendations for antibiotics.        Denies associated fevers, chills, systemic symptoms.  Afebrile.  No leukocytosis.  Inflammatory markers elevated.  Blood cultures NGTD.  New wound cultures yesterday NGTD.   CO2 angiogram scheduled for 12/12.   Currently on IV cefazolin.  Still trying to obtain [facility] records/culture data/op reports.     Plan/recommendations:  1. Continue cefazolin 2 grams IV q 8 hours  2.  [facility] from [facility] - need bone cultures sensitivities, pathology, Operative report, ID consult notes please.   3.  Will follow    Data reviewed and plan discussed with ID staff  Discussed with Primary Team

## 2018-12-08 NOTE — SUBJECTIVE & OBJECTIVE
Interval History: Patient and wife seen at bedside this morning. Updated on plan of care regarding angiogram on 12/12. Pain continues to be controlled with Tylenol PRN. No other complaints today.     Review of Systems   Constitutional: Negative for appetite change, chills, diaphoresis, fatigue and fever.   HENT: Negative for congestion, sore throat and trouble swallowing.    Eyes: Negative for photophobia, pain and discharge.   Respiratory: Negative for cough, chest tightness and shortness of breath.    Cardiovascular: Negative for chest pain, palpitations and leg swelling.   Gastrointestinal: Negative for abdominal pain, diarrhea, nausea and vomiting.   Genitourinary: Negative for decreased urine volume, difficulty urinating and urgency.   Musculoskeletal: Positive for gait problem. Negative for back pain, myalgias and neck pain.   Skin: Positive for wound. Negative for pallor and rash.   Neurological: Negative for dizziness, weakness, light-headedness, numbness and headaches.   Objective:     Vital Signs (Most Recent):  Temp: 98 °F (36.7 °C) (12/08/18 1219)  Pulse: (!) 55 (12/08/18 1219)  Resp: 18 (12/08/18 1219)  BP: 126/60 (12/08/18 1219)  SpO2: 99 % (12/08/18 1219) Vital Signs (24h Range):  Temp:  [97.6 °F (36.4 °C)-98.8 °F (37.1 °C)] 98 °F (36.7 °C)  Pulse:  [55-75] 55  Resp:  [18-20] 18  SpO2:  [95 %-99 %] 99 %  BP: (120-136)/(59-69) 126/60     Weight: 104 kg (229 lb 3.2 oz)  Body mass index is 33.85 kg/m².    Intake/Output Summary (Last 24 hours) at 12/8/2018 1309  Last data filed at 12/8/2018 0527  Gross per 24 hour   Intake 395 ml   Output 1400 ml   Net -1005 ml      Physical Exam   Constitutional: He is oriented to person, place, and time. He appears well-developed and well-nourished. No distress.   HENT:   Head: Normocephalic and atraumatic.   Mouth/Throat: Uvula is midline, oropharynx is clear and moist and mucous membranes are normal.   Eyes: Conjunctivae are normal. Pupils are equal, round, and  reactive to light.   Neck: No JVD present.   Cardiovascular: Normal rate and normal heart sounds. An irregularly irregular rhythm present.   No murmur heard.  Pulses:       Radial pulses are 2+ on the right side, and 2+ on the left side.        Dorsalis pedis pulses are 1+ on the right side, and 2+ on the left side.   Pulmonary/Chest: Effort normal and breath sounds normal. No respiratory distress.   Abdominal: Soft. Normal appearance and bowel sounds are normal. He exhibits no distension. There is no tenderness.   Musculoskeletal:   Extensive 3 cm open wound on right foot near site of 1st toe amputation with exposure of underlying bones. No purulent discharge. Dressing clean, dry, intact.    Neurological: He is alert and oriented to person, place, and time.   Skin: Skin is warm and dry. No bruising and no rash noted.     Significant Labs:   Blood Culture:   Recent Labs   Lab 12/06/18 1951   LABBLOO No Growth to date  No Growth to date  No Growth to date  No Growth to date     BMP:   Recent Labs   Lab 12/08/18  0456   *      K 4.4      CO2 24   BUN 29*   CREATININE 1.4   CALCIUM 9.6     CBC:   Recent Labs   Lab 12/06/18  1950 12/07/18  0459 12/08/18  0456   WBC 11.50 10.19 9.63   HGB 12.2* 11.0* 11.0*   HCT 38.6* 34.4* 34.8*    289 266     Coagulation:   Recent Labs   Lab 12/06/18  1950  12/08/18  1112   INR 1.2  --   --    APTT  --    < > 45.6*    < > = values in this interval not displayed.     Magnesium: No results for input(s): MG in the last 48 hours.    Significant Imaging: I have reviewed all pertinent imaging results/findings within the past 24 hours.

## 2018-12-08 NOTE — ASSESSMENT & PLAN NOTE
· Concern for possible right foot osteomyelitis with wound cultures growing MSSA and Group G Streptococcus; blood cultures negative; ID there treated with Ceftriaxone while in-patient, with plans to discharge on Doxycycline. [facility].   · Unclear if patient had imaging studies suggesting or confirming.   · Afebrile. No leukocytosis. ESR 75. CRP 93.    · Blood cultures ordered.   · ID consulted for recommendations regarding correct choice of antibiotic at [facility] and anticipated stop date of course. Status post one day of Ceftriaxone. Day #2 of Cefazolin per ID recs.   · Podiatry consulted; wound cultures and XR of foot ordered. Wound to be dressed with Betadyne and Kerflex for now. Recommending likely wound vac at discharge.

## 2018-12-08 NOTE — PLAN OF CARE
Problem: Patient Care Overview  Goal: Plan of Care Review  Outcome: Ongoing (interventions implemented as appropriate)  Pt continues to be on heparin drip and aPTT blood draw is expected to be at 2306 tonight due to rate change after aPTT resulted earlier in the day. Bolus and rate change was done twice this shift and aPTT was ordered twice as well.  Pt's pain seemed to be controlled with current pain medication on file which is the 1000 mg or Acetaminophen.  Otherwise nothing significant to report during this shift.  A wide base cane was ordered for pt for additional support when he wants to ambulate to the restroom or in his room.  He understands that he should not be putting pressure on that right foot. Pt will call staff when wife is not at bedside to provide ambulation assistance. Pt vitals stable and only needed to be covered once during dinner for insulin.

## 2018-12-08 NOTE — PROGRESS NOTES
[facility]  Infectious Disease  Progress Note    Patient Name: Jai Godinez  MRN: [id number]  Admission Date: 12/6/2018  Length of Stay: 2 days  Attending Physician: Tess Garcia MD  Primary Care Provider: Pepe Valverde MD    Isolation Status: No active isolations  Assessment/Plan:      Osteomyelitis of right foot       78 year old man with DM, CHF, severe PAD who was transferred from the [facility] for vascular surgery evaluation s/p right great toe amputation on 12/3.  Patient reports having longstanding callus right hallux. Denies history of prior diabetic foot wounds.  He developed swelling of the right great toe associated with associated erythema after a hospitalization for CHF in mid November.   He cut the skin of his toe with his pocket knife, producing purulent discharge, [facility].  [facility] and he underwent right great toe amputation on 12/3.   His post-operative course was complicated by loss of blood flow and wound was left open.  Because of kidney disease he was transferred here for CO2 angiography.        Wound cultures (? Bone cultures)  from [facility] showed MSSA and Group G strep.  Blood cultures were negative.  Per his wife, cultures were from bone and ID was consulted at the [facility].  Initially treated with vancomycin and ertapenem per wife, then transitioned to IV ceftriaxone.    ID is consulted for stop date recommendations for antibiotics.        Denies associated fevers, chills, systemic symptoms.  Afebrile.  No leukocytosis.  Inflammatory markers elevated.  Blood cultures NGTD.  New wound cultures yesterday NGTD.   CO2 angiogram scheduled for 12/12.   Currently on IV cefazolin.  Still trying to obtain [facility] records/culture data/op reports.     Plan/recommendations:  1. Continue cefazolin 2 grams IV q 8 hours  2.  [facility] from [facility] - need bone cultures sensitivities, pathology, Operative report, ID consult notes please.   3.  Will  "follow    Data reviewed and plan discussed with ID staff  Discussed with Primary Team          Thank you.   Please call for any questions or concerns.  CONSUELO Valero, ANP-C  [phone]    Subjective:     Principal Problem:Amputated great toe of right foot    HPI:   Mr. Godinez is a 78 year old man with DM, CHF, severe PAD who was transferred from the [facility] for vascular surgery evaluation s/p right great toe amputation on 12/3.  Patient reports history of hospitalization in [place] from 11/14 to 11/21 for heart failure exacerbation.  He subsequently developed swelling of the right great toe associated with associated erythema. Per patient, he had had a small wound/callus on the toe for some time.   He cut the skin of his toe with his pocket knife, producing purulent discharge, [facility].  DANILO showed severe PAD and he underwent right toe amputation on 12/3.  His post-operative course was complicated by loss of blood flow and wound was left open.  Because of kidney disease he was transferred here for CO2 angiography.      There is also concern for osteomyelitis.  Wound cultures (? Bone cultures)  from [facility] showed MSSA and Group G strep.  Blood cultures were negative.  Per his wife, cultures were from bone and ID was consulted.  Initially treated with vancomycin and ertapenem per wife, then transitioned to IV ceftriaxone.    ID is consulted for stop date recommendations for antibiotics.     Denies associated fevers, chills, systemic symptoms.  Complaining of a "cramping" pain in his right foot.     Interval History: No acute events overnight.  Afebrile, no leukocytosis.   Angiography planned for Wednesday.   Still [facility] from [facility].   On IV cefazolin.     Review of Systems   Constitutional: Negative for activity change, appetite change, chills, diaphoresis, fatigue and fever.   HENT: Negative.    Eyes: Negative.    Respiratory: Negative for cough, chest tightness and shortness of breath.  "   Cardiovascular: Positive for leg swelling. Negative for chest pain and palpitations.   Gastrointestinal: Negative for abdominal distention, abdominal pain, diarrhea, nausea and vomiting.   Genitourinary: Negative for difficulty urinating and dysuria.   Musculoskeletal: Positive for joint swelling. Negative for back pain, myalgias and neck pain.        Cramping pain right foot   Skin: Positive for wound. Negative for color change, pallor and rash.   Neurological: Negative for dizziness and headaches.   Hematological: Negative.    Psychiatric/Behavioral: Negative for agitation and confusion.     Objective:     Vital Signs (Most Recent):  Temp: 98.8 °F (37.1 °C) (12/08/18 0849)  Pulse: 75 (12/08/18 0849)  Resp: 20 (12/08/18 0849)  BP: 120/69 (12/08/18 0849)  SpO2: 99 % (12/08/18 0849) Vital Signs (24h Range):  Temp:  [97.6 °F (36.4 °C)-98.8 °F (37.1 °C)] 98.8 °F (37.1 °C)  Pulse:  [60-75] 75  Resp:  [17-20] 20  SpO2:  [95 %-99 %] 99 %  BP: (107-136)/(55-69) 120/69     Weight: 104 kg (229 lb 3.2 oz)  Body mass index is 33.85 kg/m².    Estimated Creatinine Clearance: 50.8 mL/min (based on SCr of 1.4 mg/dL).    Physical Exam   Constitutional: He is oriented to person, place, and time. He appears well-developed and well-nourished. No distress.   HENT:   Head: Normocephalic and atraumatic.   Eyes: Conjunctivae are normal. No scleral icterus.   Neck: Normal range of motion.   Cardiovascular: Normal rate. An irregularly irregular rhythm present.   No murmur heard.  Pulmonary/Chest: Effort normal and breath sounds normal. No respiratory distress. He has no wheezes.   Abdominal: Soft. He exhibits no distension. There is no tenderness.   Musculoskeletal: He exhibits no edema.   Right foot wrapped.  Old drainage noted.   See Podiatry Photos.  Noted to have exposed bone  No ascending erythema   Neurological: He is alert and oriented to person, place, and time.   Skin: Skin is warm and dry. He is not diaphoretic.   Psychiatric:  He has a normal mood and affect. His behavior is normal.   Vitals reviewed.      Significant Labs:   Blood Culture:   Recent Labs   Lab 12/06/18 1951   LABBLOO No Growth to date  No Growth to date  No Growth to date  No Growth to date     CBC:   Recent Labs   Lab 12/06/18  1950 12/07/18 0459 12/08/18 0456   WBC 11.50 10.19 9.63   HGB 12.2* 11.0* 11.0*   HCT 38.6* 34.4* 34.8*    289 266     CMP:   Recent Labs   Lab 12/06/18  1950 12/07/18 0459 12/08/18 0456    136 136   K 4.8 4.6 4.4    103 102   CO2 20* 23 24   * 163* 159*   BUN 37* 34* 29*   CREATININE 1.6* 1.5* 1.4   CALCIUM 10.5 9.7 9.6   PROT 8.5*  --   --    ALBUMIN 2.9*  --   --    BILITOT 0.6  --   --    ALKPHOS 90  --   --    AST 28  --   --    ALT 37  --   --    ANIONGAP 16 10 10   EGFRNONAA 40.4* 43.7* 47.4*     Urine Culture: No results for input(s): LABURIN in the last 4320 hours.  Urine Studies:   Recent Labs   Lab 12/07/18 1952   COLORU Yellow   APPEARANCEUA Clear   PHUR 5.0   SPECGRAV 1.015   PROTEINUA Negative   GLUCUA Negative   KETONESU Negative   BILIRUBINUA Negative   OCCULTUA Negative   NITRITE Negative   LEUKOCYTESUR Negative     Wound Culture:   Recent Labs   Lab 12/07/18  1122   LABAERO No growth       Significant Imaging: I have reviewed all pertinent imaging results/findings within the past 24 hours.

## 2018-12-08 NOTE — ASSESSMENT & PLAN NOTE
· Controlled.   · Resuming Lasix, and holding Metoprolol with HR in lower 60's.   · Discontinued home Lisinopril because of concern for possible acute on chronic STACY, but patient and wife reports history of dry cough and worsening systemic erythematous rash after Lisinopril dose was increased some months ago. Will enter as allergy in patient's chart.   · Will likely discharge on ARB instead of ACE-I.

## 2018-12-08 NOTE — ASSESSMENT & PLAN NOTE
· Status post amputation of first toe of right foot on 12/03 at [facility] secondary to either severe PAD with possible super-imposed osteomyelitis.   · [facility] that post-op course course was complicated by development non-pulsatile blood flow after, as a result podiatry has left wound open. Vascular at [facility] wanted to due angioplasty post-op, but CO2 angiography wasn't available, prompting transfer here. See vascular surgery and podiatry note for images of wound.   · Case discussed with Dr. Gomez in Vascular Surgery here at transfer. Vascular surgery evaluated; recommended repeating DANILO's and tentatively planning for angiogram. Angiogram scheduled for 12/12. DANILO's ordered; only showing mild PAD.   · Exam notable for diminished pulses in right foot and lower extremity. .   · Will resume heparin drip at low intensity for anticoagulation with new Afib.   · Wound care consulted; deferred care recommendations to podiatry.   · PT/OT ordered.

## 2018-12-08 NOTE — ASSESSMENT & PLAN NOTE
· Irregular irregular rhythm on exam with HR controlled in lower 60's.   · Will hold home Metoprolol for now.   · Started on heparin infusion at [facility] with plans to transition with Dabigatran.   · INR 1.2 on admission.   · Will start heparin infusion here with low intensity and follow aPTT. APTT 53 today.

## 2018-12-08 NOTE — ASSESSMENT & PLAN NOTE
· Improved.   · Unclear of baseline; no outside records available.   · Could be acute, but patient with history of poorly controlled T2DM.   · BUN 37, Cr 1.6, and GFR 40 on admission.    · Holding home Lisinopril in setting of possible STACY.   · LR maintenance ordered on 12/7. BUN 29, Cr 1.4 today.  · BMP daily.

## 2018-12-08 NOTE — PROGRESS NOTES
[facility] Hospital Medicine  Progress Note    Patient Name: Jai Godinez  MRN: [id number]  Patient Class: IP- Inpatient   Admission Date: 12/6/2018  Length of Stay: 2 days  Attending Physician: Tess Garcia MD  Primary Care Provider: Pepe Valverde MD    [facility] Medicine Team: Networked reference to record PCT  Miguel Angel Jones MD    Subjective:     Principal Problem:Amputated great toe of right foot    HPI:  This is a 78 year old female transferred from the [facility] for vascular surgery repair of right great toe status post amputation. He has a PMH of quadruple bypass (2001), severe peripheral artery disease, HFrEF (EF 45-50%), T2DM (A1c 8.1), HTN, SANDIE (on CPAP nightly) and diverticulosis.     He was hospitalized in [place] from 11/14 to 11/21 for acute on chronic heart failure exacerbation; discharge home with resolution of symptoms. He then developed one day of progressively worsening swelling of the right great toe associated with erythematous skin changes; for relief, he cut the skin of the toe with his pocket knife, at which point noticed purulent discharge, [facility]     Patient underwent right toe amputation done on 12/3 for pre-operative DANILO indicative of severe PAD. His post-operative course was complicated by development non-pulsatile blood flow after, as a result podiatry has left wound open. Vascular  at [facility] wanted to due angioplasty post-op, but CO2 angiography wasn't available. Also, concern for possible right foot osteomyelitis with wound cultures growing MSSA and Group G Streptococcus; blood cultures negative; ID there treated with Ceftriaxone while in-patient, with plans to discharge on Doxycycline.  In addition, on presentation, he was noted to have new development of Afib with slow ventricular response; rate control with Metoprolol and started on heparin drip while inpatient; transitioned to Dabigatran prior to transfer.     Patient/family  requested transfer elsewhere to accomplish procedure. [facility] contacted today and case was discussed with Dr. Patricia SaldanaVascular,  who requested patient to transfer to hospital medicine with vascular surgery consults.        On arrival here, he reports episodic cramping pain isolated to the amputation site occurring approximately Q5min. Pain relieved by taking Tylenol PRN. Denies fevers, chills, chest pain, SOB, palpitations, numbness of extremities.         Hospital Course:  12/07: No acute events overnight. Vascular surgery assessed this morning; recommended repeat DANILO and tentative angiogram today. Wound care and podiatry consulted for open wound and right foot osteomyelitis.  Wound care defering to podiatry. Podiatry repeating cultures and XR of foot. ID consulted for antibiotic recs; Ceftriaxone changed to Cefazolin (day #1). LR maintenance started to attempt optimization of kidney function; d/c home Lisinopril. [facility].   12/08: No events overnight. DANILO showing mild PAD. Scheduled for angiogram 12/12. BUN and Cr improved to 29 and 1.4 with maintenance LR. ESR 93, CRP 75. Day #2 of Cefazolin.   12/09:     Interval History: Patient and wife seen at bedside this morning. Updated on plan of care regarding angiogram on 12/12. Pain continues to be controlled with Tylenol PRN. No other complaints today.     Review of Systems   Constitutional: Negative for appetite change, chills, diaphoresis, fatigue and fever.   HENT: Negative for congestion, sore throat and trouble swallowing.    Eyes: Negative for photophobia, pain and discharge.   Respiratory: Negative for cough, chest tightness and shortness of breath.    Cardiovascular: Negative for chest pain, palpitations and leg swelling.   Gastrointestinal: Negative for abdominal pain, diarrhea, nausea and vomiting.   Genitourinary: Negative for decreased urine volume, difficulty urinating and urgency.   Musculoskeletal: Positive for  gait problem. Negative for back pain, myalgias and neck pain.   Skin: Positive for wound. Negative for pallor and rash.   Neurological: Negative for dizziness, weakness, light-headedness, numbness and headaches.   Objective:     Vital Signs (Most Recent):  Temp: 98 °F (36.7 °C) (12/08/18 1219)  Pulse: (!) 55 (12/08/18 1219)  Resp: 18 (12/08/18 1219)  BP: 126/60 (12/08/18 1219)  SpO2: 99 % (12/08/18 1219) Vital Signs (24h Range):  Temp:  [97.6 °F (36.4 °C)-98.8 °F (37.1 °C)] 98 °F (36.7 °C)  Pulse:  [55-75] 55  Resp:  [18-20] 18  SpO2:  [95 %-99 %] 99 %  BP: (120-136)/(59-69) 126/60     Weight: 104 kg (229 lb 3.2 oz)  Body mass index is 33.85 kg/m².    Intake/Output Summary (Last 24 hours) at 12/8/2018 1309  Last data filed at 12/8/2018 0527  Gross per 24 hour   Intake 395 ml   Output 1400 ml   Net -1005 ml      Physical Exam   Constitutional: He is oriented to person, place, and time. He appears well-developed and well-nourished. No distress.   HENT:   Head: Normocephalic and atraumatic.   Mouth/Throat: Uvula is midline, oropharynx is clear and moist and mucous membranes are normal.   Eyes: Conjunctivae are normal. Pupils are equal, round, and reactive to light.   Neck: No JVD present.   Cardiovascular: Normal rate and normal heart sounds. An irregularly irregular rhythm present.   No murmur heard.  Pulses:       Radial pulses are 2+ on the right side, and 2+ on the left side.        Dorsalis pedis pulses are 1+ on the right side, and 2+ on the left side.   Pulmonary/Chest: Effort normal and breath sounds normal. No respiratory distress.   Abdominal: Soft. Normal appearance and bowel sounds are normal. He exhibits no distension. There is no tenderness.   Musculoskeletal:   Extensive 3 cm open wound on right foot near site of 1st toe amputation with exposure of underlying bones. No purulent discharge. Dressing clean, dry, intact.    Neurological: He is alert and oriented to person, place, and time.   Skin: Skin is  warm and dry. No bruising and no rash noted.     Significant Labs:   Blood Culture:   Recent Labs   Lab 12/06/18 1951   LABBLOO No Growth to date  No Growth to date  No Growth to date  No Growth to date     BMP:   Recent Labs   Lab 12/08/18 0456   *      K 4.4      CO2 24   BUN 29*   CREATININE 1.4   CALCIUM 9.6     CBC:   Recent Labs   Lab 12/06/18  1950 12/07/18 0459 12/08/18 0456   WBC 11.50 10.19 9.63   HGB 12.2* 11.0* 11.0*   HCT 38.6* 34.4* 34.8*    289 266     Coagulation:   Recent Labs   Lab 12/06/18  1950  12/08/18  1112   INR 1.2  --   --    APTT  --    < > 45.6*    < > = values in this interval not displayed.     Magnesium: No results for input(s): MG in the last 48 hours.    Significant Imaging: I have reviewed all pertinent imaging results/findings within the past 24 hours.    Assessment/Plan:      * Amputated great toe of right foot    · Status post amputation of first toe of right foot on 12/03 at [facility] secondary to either severe PAD with possible super-imposed osteomyelitis.   · [facility] course was complicated by development non-pulsatile blood flow after, as a result podiatry has left wound open. Vascular at [facility] wanted to due angioplasty post-op, but CO2 angiography wasn't available, prompting transfer here. See vascular surgery and podiatry note for images of wound.   · Case discussed with Dr. Gomez in Vascular Surgery here at transfer. Vascular surgery evaluated; recommended repeating DANILO's and tentatively planning for angiogram. Angiogram scheduled for 12/12. DANILO's ordered; only showing mild PAD.   · Exam notable for diminished pulses in right foot and lower extremity. .   · Will resume heparin drip at low intensity for anticoagulation with new Afib.   · Wound care consulted; deferred care recommendations to podiatry.   · PT/OT ordered.        Open wound of right foot    See assessment for right great toe amputation and right foot  osteomyelitis.        Stage 3 chronic kidney disease    · Improved.   · Unclear of baseline; no outside records available.   · Could be acute, but patient with history of poorly controlled T2DM.   · BUN 37, Cr 1.6, and GFR 40 on admission.    · Holding home Lisinopril in setting of possible STACY.   · LR maintenance ordered on 12/7. BUN 29, Cr 1.4 today.  · BMP daily.           Osteomyelitis of right foot    · Concern for possible right foot osteomyelitis with wound cultures growing MSSA and Group G Streptococcus; blood cultures negative; ID there treated with Ceftriaxone while in-patient, with plans to discharge on Doxycycline. [facility].   · Unclear if patient had imaging studies suggesting or confirming.   · Afebrile. No leukocytosis. ESR 75. CRP 93.    · Blood cultures ordered.   · ID consulted for recommendations regarding correct choice of antibiotic at [facility] and anticipated stop date of course. Status post one day of Ceftriaxone. Day #2 of Cefazolin per ID recs.   · Podiatry consulted; wound cultures and XR of foot ordered. Wound to be dressed with Betadyne and Kerflex for now. Recommending likely wound vac at discharge.        Atrial fibrillation with slow ventricular response    · Irregular irregular rhythm on exam with HR controlled in lower 60's.   · Will hold home Metoprolol for now.   · Started on heparin infusion at [facility] with plans to transition with Dabigatran.   · INR 1.2 on admission.   · Will start heparin infusion here with low intensity and follow aPTT. APTT 53 today.        Chronic systolic heart failure    · Last EF 40-50% at [facility] per transfer note.   · No signs of fluid overload on exam.   · Managed with Lasix 40 mg BID, Lisinopril 40 mg, and Metoprolol daily.   · Holding Metoprolol temporarily with HR on admission in lower 60's and Lisinopril with possible STACY.  Lisinopril should be changed to ARB on discharge given history of chronic dry cough and worsening systemic rash  after Lisinopril dose was increased some months ago.  · Will order cardiac and diabetic diet.         Essential hypertension    · Controlled.   · Resuming Lasix, and holding Metoprolol with HR in lower 60's.   · Discontinued home Lisinopril because of concern for possible acute on chronic STACY, but patient and wife reports history of dry cough and worsening systemic erythematous rash after Lisinopril dose was increased some months ago. Will enter as allergy in patient's chart.   · Will likely discharge on ARB instead of ACE-I.        Type 2 diabetes mellitus, without long-term current use of insulin    · Last A1c 8 at outside facility.   · Holding home Metformin and Glipizide  · Will order low dose sliding scale on admission.        Peripheral arterial disease    See assessment for amputation of great right toe.          VTE Risk Mitigation (From admission, onward)        Ordered     heparin, porcine (PF) 100 unit/mL injection flush 10 Units  As needed (PRN)      12/06/18 2119     heparin 25,000 units in dextrose 5% 250 mL (100 units/mL) infusion LOW INTENSITY nomogram - [facility]  Continuous      12/06/18 1937     heparin 25,000 units in dextrose 5% (100 units/ml) IV bolus from bag - ADDITIONAL PRN BOLUS - 30 units/kg  As needed (PRN)      12/06/18 1937     heparin 25,000 units in dextrose 5% (100 units/ml) IV bolus from bag - ADDITIONAL PRN BOLUS - 60 units/kg  As needed (PRN)      12/06/18 1937     IP VTE HIGH RISK PATIENT  Once      12/06/18 1859              Miguel Angel Jones MD  Department of Hospital Medicine   [facility]   no

## 2018-12-09 LAB
ANION GAP SERPL CALC-SCNC: 11 MMOL/L
APTT BLDCRRT: 41.8 SEC
BASOPHILS # BLD AUTO: 0.07 K/UL
BASOPHILS NFR BLD: 0.9 %
BUN SERPL-MCNC: 27 MG/DL
CALCIUM SERPL-MCNC: 9.3 MG/DL
CHLORIDE SERPL-SCNC: 104 MMOL/L
CO2 SERPL-SCNC: 22 MMOL/L
CREAT SERPL-MCNC: 1.4 MG/DL
DIFFERENTIAL METHOD: ABNORMAL
EOSINOPHIL # BLD AUTO: 0.4 K/UL
EOSINOPHIL NFR BLD: 5.7 %
ERYTHROCYTE [DISTWIDTH] IN BLOOD BY AUTOMATED COUNT: 13.5 %
EST. GFR  (AFRICAN AMERICAN): 54.9 ML/MIN/1.73 M^2
EST. GFR  (NON AFRICAN AMERICAN): 47.4 ML/MIN/1.73 M^2
GLUCOSE SERPL-MCNC: 174 MG/DL
HCT VFR BLD AUTO: 33.7 %
HGB BLD-MCNC: 10.6 G/DL
IMM GRANULOCYTES # BLD AUTO: 0.05 K/UL
IMM GRANULOCYTES NFR BLD AUTO: 0.6 %
LYMPHOCYTES # BLD AUTO: 1.5 K/UL
LYMPHOCYTES NFR BLD: 19 %
MCH RBC QN AUTO: 29.3 PG
MCHC RBC AUTO-ENTMCNC: 31.5 G/DL
MCV RBC AUTO: 93 FL
MONOCYTES # BLD AUTO: 1 K/UL
MONOCYTES NFR BLD: 12.9 %
NEUTROPHILS # BLD AUTO: 4.7 K/UL
NEUTROPHILS NFR BLD: 60.9 %
NRBC BLD-RTO: 0 /100 WBC
PLATELET # BLD AUTO: 245 K/UL
PMV BLD AUTO: 10.6 FL
POCT GLUCOSE: 170 MG/DL (ref 70–110)
POCT GLUCOSE: 174 MG/DL (ref 70–110)
POCT GLUCOSE: 223 MG/DL (ref 70–110)
POCT GLUCOSE: 226 MG/DL (ref 70–110)
POTASSIUM SERPL-SCNC: 4.4 MMOL/L
RBC # BLD AUTO: 3.62 M/UL
SODIUM SERPL-SCNC: 137 MMOL/L
WBC # BLD AUTO: 7.77 K/UL

## 2018-12-09 PROCEDURE — 11000001 HC ACUTE MED/SURG PRIVATE ROOM

## 2018-12-09 PROCEDURE — 99232 SBSQ HOSP IP/OBS MODERATE 35: CPT | Mod: GC,,, | Performed by: STUDENT IN AN ORGANIZED HEALTH CARE EDUCATION/TRAINING PROGRAM

## 2018-12-09 PROCEDURE — 63600175 PHARM REV CODE 636 W HCPCS: Performed by: STUDENT IN AN ORGANIZED HEALTH CARE EDUCATION/TRAINING PROGRAM

## 2018-12-09 PROCEDURE — 63600175 PHARM REV CODE 636 W HCPCS: Performed by: NURSE PRACTITIONER

## 2018-12-09 PROCEDURE — 80048 BASIC METABOLIC PNL TOTAL CA: CPT

## 2018-12-09 PROCEDURE — 85025 COMPLETE CBC W/AUTO DIFF WBC: CPT

## 2018-12-09 PROCEDURE — 25000003 PHARM REV CODE 250: Performed by: STUDENT IN AN ORGANIZED HEALTH CARE EDUCATION/TRAINING PROGRAM

## 2018-12-09 PROCEDURE — 36415 COLL VENOUS BLD VENIPUNCTURE: CPT

## 2018-12-09 PROCEDURE — 85730 THROMBOPLASTIN TIME PARTIAL: CPT

## 2018-12-09 RX ORDER — GABAPENTIN 100 MG/1
100 CAPSULE ORAL 3 TIMES DAILY PRN
Status: DISCONTINUED | OUTPATIENT
Start: 2018-12-09 | End: 2018-12-12

## 2018-12-09 RX ADMIN — HEPARIN SODIUM AND DEXTROSE 19 UNITS/KG/HR: 10000; 5 INJECTION INTRAVENOUS at 11:12

## 2018-12-09 RX ADMIN — CEFAZOLIN 2 G: 330 INJECTION, POWDER, FOR SOLUTION INTRAMUSCULAR; INTRAVENOUS at 09:12

## 2018-12-09 RX ADMIN — FUROSEMIDE 40 MG: 40 TABLET ORAL at 09:12

## 2018-12-09 RX ADMIN — CEFAZOLIN 2 G: 330 INJECTION, POWDER, FOR SOLUTION INTRAMUSCULAR; INTRAVENOUS at 06:12

## 2018-12-09 RX ADMIN — INSULIN ASPART 2 UNITS: 100 INJECTION, SOLUTION INTRAVENOUS; SUBCUTANEOUS at 03:12

## 2018-12-09 RX ADMIN — PRAVASTATIN SODIUM 10 MG: 10 TABLET ORAL at 09:12

## 2018-12-09 RX ADMIN — INSULIN ASPART 1 UNITS: 100 INJECTION, SOLUTION INTRAVENOUS; SUBCUTANEOUS at 09:12

## 2018-12-09 RX ADMIN — CEFAZOLIN 2 G: 330 INJECTION, POWDER, FOR SOLUTION INTRAMUSCULAR; INTRAVENOUS at 03:12

## 2018-12-09 RX ADMIN — GABAPENTIN 100 MG: 100 CAPSULE ORAL at 09:12

## 2018-12-09 NOTE — ASSESSMENT & PLAN NOTE
· Concern for possible right foot osteomyelitis with wound cultures growing MSSA and Group G Streptococcus; blood cultures negative; ID there treated with Ceftriaxone while in-patient, with plans to discharge on Doxycycline. [facility]   · Unclear if patient had imaging studies suggesting or confirming.   · Afebrile. No leukocytosis. ESR 75. CRP 93.    · Blood cultures ordered.   · ID consulted for recommendations regarding correct choice of antibiotic at [facility] and anticipated stop date of course. Status post one day of Ceftriaxone. Day #3 of Cefazolin per ID recs.   · Podiatry consulted; wound cultures and XR of foot ordered. Wound to be dressed with Betadyne and Kerflex for now. Recommending likely wound vac at discharge.

## 2018-12-09 NOTE — PLAN OF CARE
Problem: Patient Care Overview  Goal: Plan of Care Review  Outcome: Ongoing (interventions implemented as appropriate)  Pt A&O x 4.  VSS on room air.  Dressing to RLE changed.  Pt on continuous heparin gtt.  Last aPTT therapeutic.  Pt received IV cefazolin.  Pt c/o moderate pain to RLE.  Given PRN tylenol, full relief obtained.

## 2018-12-09 NOTE — NURSING
Contacted [facility].  Pt has tylenol available PRN only for pain to RLE.  Pt approaching 4 G tylenol within 24 hour period.  Requesting alternate pain med.  Pt and family declining opioid pain meds.  Provider will review chart.

## 2018-12-09 NOTE — ASSESSMENT & PLAN NOTE
· Last EF 40-50% at [facility] per transfer note.   · No signs of fluid overload on exam.   · Managed with Lasix 40 mg BID, Lisinopril 40 mg, and Metoprolol daily.   · Holding Metoprolol temporarily with HR on admission in lower 60's and Lisinopril with possible STACY.  Lisinopril should be changed to ARB on discharge given history of chronic dry cough and worsening systemic rash after Lisinopril dose was increased some months ago.  · Will order cardiac and diabetic diet.

## 2018-12-09 NOTE — ASSESSMENT & PLAN NOTE
· Irregular irregular rhythm on exam with HR controlled in lower 60's.   · Will hold home Metoprolol for now.   · Started on heparin infusion at [facility] with plans to transition with Dabigatran.   · INR 1.2 on admission.   · Will start heparin infusion here with low intensity and follow aPTT. APTT 41 today.

## 2018-12-09 NOTE — SUBJECTIVE & OBJECTIVE
Interval History: Patient and wife much more pleasant this morning. Understand that he is on schedule for Wednesday. [facility] . Patient states his pain has improved with gabapentin     Review of Systems   Constitutional: Negative for appetite change, chills, diaphoresis, fatigue and fever.   HENT: Negative for congestion, sore throat and trouble swallowing.    Eyes: Negative for photophobia, pain and discharge.   Respiratory: Negative for cough, chest tightness and shortness of breath.    Cardiovascular: Negative for chest pain, palpitations and leg swelling.   Gastrointestinal: Negative for abdominal pain, diarrhea, nausea and vomiting.   Genitourinary: Negative for decreased urine volume, difficulty urinating and urgency.   Musculoskeletal: Positive for gait problem. Negative for back pain, myalgias and neck pain.   Skin: Positive for wound. Negative for pallor and rash.   Neurological: Negative for dizziness, weakness, light-headedness, numbness and headaches.     Objective:     Vital Signs (Most Recent):  Temp: 98 °F (36.7 °C) (12/09/18 0836)  Pulse: 62 (12/09/18 0836)  Resp: 16 (12/09/18 0836)  BP: (!) 115/58 (12/09/18 0836)  SpO2: (!) 94 % (12/09/18 0836) Vital Signs (24h Range):  Temp:  [97.5 °F (36.4 °C)-98.5 °F (36.9 °C)] 98 °F (36.7 °C)  Pulse:  [55-66] 62  Resp:  [16-20] 16  SpO2:  [94 %-99 %] 94 %  BP: (115-126)/(54-60) 115/58     Weight: 104.3 kg (230 lb)  Body mass index is 33.97 kg/m².    Intake/Output Summary (Last 24 hours) at 12/9/2018 0943  Last data filed at 12/9/2018 0900  Gross per 24 hour   Intake 2024.67 ml   Output 2000 ml   Net 24.67 ml      Physical Exam   Constitutional: He is oriented to person, place, and time. He appears well-developed and well-nourished. No distress.   HENT:   Head: Normocephalic and atraumatic.   Mouth/Throat: Uvula is midline, oropharynx is clear and moist and mucous membranes are normal.   Eyes: Conjunctivae are normal. Pupils are  equal, round, and reactive to light.   Neck: No JVD present.   Cardiovascular: Normal rate and normal heart sounds. An irregularly irregular rhythm present.   No murmur heard.  Pulses:       Radial pulses are 2+ on the right side, and 2+ on the left side.        Dorsalis pedis pulses are 1+ on the right side, and 2+ on the left side.   Pulmonary/Chest: Effort normal and breath sounds normal. No respiratory distress.   Abdominal: Soft. Normal appearance and bowel sounds are normal. He exhibits no distension. There is no tenderness.   Musculoskeletal:   Extensive 3 cm open wound on right foot near site of 1st toe amputation with exposure of underlying bones. No purulent discharge. Dressing clean, dry, intact.    Neurological: He is alert and oriented to person, place, and time.   Skin: Skin is warm and dry. No bruising and no rash noted.       Significant Labs:   CBC:   Recent Labs   Lab 12/08/18  0456 12/09/18  0430   WBC 9.63 7.77   HGB 11.0* 10.6*   HCT 34.8* 33.7*    245     CMP:   Recent Labs   Lab 12/08/18  0456 12/09/18  0430    137   K 4.4 4.4    104   CO2 24 22*   * 174*   BUN 29* 27*   CREATININE 1.4 1.4   CALCIUM 9.6 9.3   ANIONGAP 10 11   EGFRNONAA 47.4* 47.4*     All pertinent labs within the past 24 hours have been reviewed.    Significant Imaging: I have reviewed all pertinent imaging results/findings within the past 24 hours.  I have reviewed and interpreted all pertinent imaging results/findings within the past 24 hours.

## 2018-12-09 NOTE — ASSESSMENT & PLAN NOTE
· Status post amputation of first toe of right foot on 12/03 at [facility] secondary to either severe PAD with possible super-imposed osteomyelitis.   · [facility] course was complicated by development non-pulsatile blood flow after, as a result podiatry has left wound open. Vascular at [facility] wanted to due angioplasty post-op, but CO2 angiography wasn't available, prompting transfer here. See vascular surgery and podiatry note for images of wound.   · Case discussed with Dr. Gomez in Vascular Surgery here at transfer. Vascular surgery evaluated; recommended repeating DANILO's and tentatively planning for angiogram. Angiogram scheduled for 12/12. DANILO's ordered; only showing mild PAD.   · Exam notable for diminished pulses in right foot and lower extremity. .   · Will resume heparin drip at low intensity for anticoagulation with new Afib.   · Wound care consulted; deferred care recommendations to podiatry.   · Can conitinue with gabapentin if it relieves his pain start at 100 mg TID  · PT/OT ordered.

## 2018-12-09 NOTE — PROGRESS NOTES
[facility] Hospital Medicine  Progress Note    Patient Name: Jai Godinez  MRN: [id number]  Patient Class: IP- Inpatient   Admission Date: 12/6/2018  Length of Stay: 3 days  Attending Physician: Tess Garcia MD  Primary Care Provider: Pepe Valverde MD    [facility] Medicine Team: [facility] 5 Nichelle Leonard MD    Subjective:     Principal Problem:Amputated great toe of right foot    HPI:  This is a 78 year old female transferred from the [facility] for vascular surgery repair of right great toe status post amputation. He has a PMH of quadruple bypass (2001), severe peripheral artery disease, HFrEF (EF 45-50%), T2DM (A1c 8.1), HTN, SANDIE (on CPAP nightly) and diverticulosis.     He was hospitalized in [place] from 11/14 to 11/21 for acute on chronic heart failure exacerbation; discharge home with resolution of symptoms. He then developed one day of progressively worsening swelling of the right great toe associated with erythematous skin changes; for relief, he cut the skin of the toe with his pocket knife, at which point noticed purulent discharge, [facility]     Patient underwent right toe amputation done on 12/3 for pre-operative DANILO indicative of severe PAD. His post-operative course was complicated by development non-pulsatile blood flow after, as a result podiatry has left wound open. Vascular  at [facility] wanted to due angioplasty post-op, but CO2 angiography wasn't available. Also, concern for possible right foot osteomyelitis with wound cultures growing MSSA and Group G Streptococcus; blood cultures negative; ID there treated with Ceftriaxone while in-patient, with plans to discharge on Doxycycline.  In addition, on presentation, he was noted to have new development of Afib with slow ventricular response; rate control with Metoprolol and started on heparin drip while inpatient; transitioned to Dabigatran prior to transfer.     Patient/family requested transfer elsewhere  to accomplish procedure. [facility] center contacted today and case was discussed with Dr. Gomez -Vascular,  who requested patient to transfer to hospital medicine with vascular surgery consults.        On arrival here, he reports episodic cramping pain isolated to the amputation site occurring approximately Q5min. Pain relieved by taking Tylenol PRN. Denies fevers, chills, chest pain, SOB, palpitations, numbness of extremities.         Hospital Course:  12/07: No acute events overnight. Vascular surgery assessed this morning; recommended repeat DANILO and tentative angiogram today. Wound care and podiatry consulted for open wound and right foot osteomyelitis.  Wound care defering to podiatry. Podiatry repeating cultures and XR of foot. ID consulted for antibiotic recs; Ceftriaxone changed to Cefazolin (day #1). LR maintenance started to attempt optimization of kidney function; d/c home Lisinopril. [facility] from [facility].   12/08: No events overnight. DANILO showing mild PAD. Scheduled for angiogram 12/12. BUN and Cr improved to 29 and 1.4 with maintenance LR. ESR 93, CRP 75. Day #2 of Cefazolin.   12/09:     Interval History: Patient and wife much more pleasant this morning. Understand that he is on schedule for Wednesday. Still [facility] . Patient states his pain has improved with gabapentin     Review of Systems   Constitutional: Negative for appetite change, chills, diaphoresis, fatigue and fever.   HENT: Negative for congestion, sore throat and trouble swallowing.    Eyes: Negative for photophobia, pain and discharge.   Respiratory: Negative for cough, chest tightness and shortness of breath.    Cardiovascular: Negative for chest pain, palpitations and leg swelling.   Gastrointestinal: Negative for abdominal pain, diarrhea, nausea and vomiting.   Genitourinary: Negative for decreased urine volume, difficulty urinating and urgency.   Musculoskeletal: Positive for gait problem.  Negative for back pain, myalgias and neck pain.   Skin: Positive for wound. Negative for pallor and rash.   Neurological: Negative for dizziness, weakness, light-headedness, numbness and headaches.     Objective:     Vital Signs (Most Recent):  Temp: 98 °F (36.7 °C) (12/09/18 0836)  Pulse: 62 (12/09/18 0836)  Resp: 16 (12/09/18 0836)  BP: (!) 115/58 (12/09/18 0836)  SpO2: (!) 94 % (12/09/18 0836) Vital Signs (24h Range):  Temp:  [97.5 °F (36.4 °C)-98.5 °F (36.9 °C)] 98 °F (36.7 °C)  Pulse:  [55-66] 62  Resp:  [16-20] 16  SpO2:  [94 %-99 %] 94 %  BP: (115-126)/(54-60) 115/58     Weight: 104.3 kg (230 lb)  Body mass index is 33.97 kg/m².    Intake/Output Summary (Last 24 hours) at 12/9/2018 0943  Last data filed at 12/9/2018 0900  Gross per 24 hour   Intake 2024.67 ml   Output 2000 ml   Net 24.67 ml      Physical Exam   Constitutional: He is oriented to person, place, and time. He appears well-developed and well-nourished. No distress.   HENT:   Head: Normocephalic and atraumatic.   Mouth/Throat: Uvula is midline, oropharynx is clear and moist and mucous membranes are normal.   Eyes: Conjunctivae are normal. Pupils are equal, round, and reactive to light.   Neck: No JVD present.   Cardiovascular: Normal rate and normal heart sounds. An irregularly irregular rhythm present.   No murmur heard.  Pulses:       Radial pulses are 2+ on the right side, and 2+ on the left side.        Dorsalis pedis pulses are 1+ on the right side, and 2+ on the left side.   Pulmonary/Chest: Effort normal and breath sounds normal. No respiratory distress.   Abdominal: Soft. Normal appearance and bowel sounds are normal. He exhibits no distension. There is no tenderness.   Musculoskeletal:   Extensive 3 cm open wound on right foot near site of 1st toe amputation with exposure of underlying bones. No purulent discharge. Dressing clean, dry, intact.    Neurological: He is alert and oriented to person, place, and time.   Skin: Skin is warm and  dry. No bruising and no rash noted.       Significant Labs:   CBC:   Recent Labs   Lab 12/08/18  0456 12/09/18  0430   WBC 9.63 7.77   HGB 11.0* 10.6*   HCT 34.8* 33.7*    245     CMP:   Recent Labs   Lab 12/08/18  0456 12/09/18  0430    137   K 4.4 4.4    104   CO2 24 22*   * 174*   BUN 29* 27*   CREATININE 1.4 1.4   CALCIUM 9.6 9.3   ANIONGAP 10 11   EGFRNONAA 47.4* 47.4*     All pertinent labs within the past 24 hours have been reviewed.    Significant Imaging: I have reviewed all pertinent imaging results/findings within the past 24 hours.  I have reviewed and interpreted all pertinent imaging results/findings within the past 24 hours.    Assessment/Plan:      * Amputated great toe of right foot    · Status post amputation of first toe of right foot on 12/03 at [facility] secondary to either severe PAD with possible super-imposed osteomyelitis.   · [facility] that [facility] course was complicated by development non-pulsatile blood flow after, as a result podiatry has left wound open. Vascular at [facility] wanted to due angioplasty post-op, but CO2 angiography wasn't available, prompting transfer here. See vascular surgery and podiatry note for images of wound.   · Case discussed with Dr. Gomez in Vascular Surgery here at transfer. Vascular surgery evaluated; recommended repeating DANILO's and tentatively planning for angiogram. Angiogram scheduled for 12/12. DANILO's ordered; only showing mild PAD.   · Exam notable for diminished pulses in right foot and lower extremity. .   · Will resume heparin drip at low intensity for anticoagulation with new Afib.   · Wound care consulted; deferred care recommendations to podiatry.   · Can conitinue with gabapentin if it relieves his pain start at 100 mg TID  · PT/OT ordered.        Open wound of right foot    See assessment for right great toe amputation and right foot osteomyelitis.        Stage 3 chronic kidney disease    · Improved.   · Unclear of  baseline; no outside records available.   · Could be acute, but patient with history of poorly controlled T2DM.   · BUN 37, Cr 1.6, and GFR 40 on admission.    · Holding home Lisinopril in setting of possible STACY.   · LR maintenance ordered on 12/7. BUN 29, Cr 1.4 today.  · BMP daily.           Osteomyelitis of right foot    · Concern for possible right foot osteomyelitis with wound cultures growing MSSA and Group G Streptococcus; blood cultures negative; ID there treated with Ceftriaxone while in-patient, with plans to discharge on Doxycycline. [facility] to obtain records from [facility].   · Unclear if patient had imaging studies suggesting or confirming.   · Afebrile. No leukocytosis. ESR 75. CRP 93.    · Blood cultures ordered.   · ID consulted for recommendations regarding correct choice of antibiotic at [facility] and anticipated stop date of course. Status post one day of Ceftriaxone. Day #3 of Cefazolin per ID recs.   · Podiatry consulted; wound cultures and XR of foot ordered. Wound to be dressed with Betadyne and Kerflex for now. Recommending likely wound vac at discharge.        Atrial fibrillation with slow ventricular response    · Irregular irregular rhythm on exam with HR controlled in lower 60's.   · Will hold home Metoprolol for now.   · Started on heparin infusion at [facility] with plans to transition with Dabigatran.   · INR 1.2 on admission.   · Will start heparin infusion here with low intensity and follow aPTT. APTT 41 today.        Chronic systolic heart failure    · Last EF 40-50% at [facility] per transfer note.   · No signs of fluid overload on exam.   · Managed with Lasix 40 mg BID, Lisinopril 40 mg, and Metoprolol daily.   · Holding Metoprolol temporarily with HR on admission in lower 60's and Lisinopril with possible STACY.  Lisinopril should be changed to ARB on discharge given history of chronic dry cough and worsening systemic rash after Lisinopril dose was increased some months ago.  · Will order cardiac and  diabetic diet.         Essential hypertension    · Controlled.   · Resuming Lasix, and holding Metoprolol with HR in lower 60's.   · Discontinued home Lisinopril because of concern for possible acute on chronic STACY, but patient and wife reports history of dry cough and worsening systemic erythematous rash after Lisinopril dose was increased some months ago. Will enter as allergy in patient's chart.   · Will likely discharge on ARB instead of ACE-I.        Type 2 diabetes mellitus, without long-term current use of insulin    · Last A1c 8 at outside facility.   · Holding home Metformin and Glipizide  · Will order low dose sliding scale on admission.        Peripheral arterial disease    See assessment for amputation of great right toe.          VTE Risk Mitigation (From admission, onward)        Ordered     heparin, porcine (PF) 100 unit/mL injection flush 10 Units  As needed (PRN)      12/06/18 2119     heparin 25,000 units in dextrose 5% 250 mL (100 units/mL) infusion LOW INTENSITY nomogram - [facility]  Continuous      12/06/18 1937     heparin 25,000 units in dextrose 5% (100 units/ml) IV bolus from bag - ADDITIONAL PRN BOLUS - 30 units/kg  As needed (PRN)      12/06/18 1937     heparin 25,000 units in dextrose 5% (100 units/ml) IV bolus from bag - ADDITIONAL PRN BOLUS - 60 units/kg  As needed (PRN)      12/06/18 1937     IP VTE HIGH RISK PATIENT  Once      12/06/18 1859        Patient seen and examined this morning. Discussed with Dr. Garcia  - staff attestation to follow.          Nichelle Leonard MD  Department of Hospital Medicine   [facility]

## 2018-12-10 ENCOUNTER — ANESTHESIA EVENT (OUTPATIENT)
Dept: SURGERY | Facility: HOSPITAL | Age: 78
DRG: 300 | End: 2018-12-10
Payer: OTHER GOVERNMENT

## 2018-12-10 LAB
ANION GAP SERPL CALC-SCNC: 10 MMOL/L
APTT BLDCRRT: 41.3 SEC
BACTERIA SPEC AEROBE CULT: NO GROWTH
BASOPHILS # BLD AUTO: 0.07 K/UL
BASOPHILS NFR BLD: 0.8 %
BUN SERPL-MCNC: 25 MG/DL
CALCIUM SERPL-MCNC: 9.7 MG/DL
CHLORIDE SERPL-SCNC: 103 MMOL/L
CO2 SERPL-SCNC: 23 MMOL/L
CREAT SERPL-MCNC: 1.5 MG/DL
DIFFERENTIAL METHOD: ABNORMAL
EOSINOPHIL # BLD AUTO: 0.5 K/UL
EOSINOPHIL NFR BLD: 6.1 %
ERYTHROCYTE [DISTWIDTH] IN BLOOD BY AUTOMATED COUNT: 13.5 %
EST. GFR  (AFRICAN AMERICAN): 50.5 ML/MIN/1.73 M^2
EST. GFR  (NON AFRICAN AMERICAN): 43.7 ML/MIN/1.73 M^2
GLUCOSE SERPL-MCNC: 176 MG/DL
HCT VFR BLD AUTO: 35.1 %
HGB BLD-MCNC: 10.9 G/DL
IMM GRANULOCYTES # BLD AUTO: 0.05 K/UL
IMM GRANULOCYTES NFR BLD AUTO: 0.6 %
LYMPHOCYTES # BLD AUTO: 1.9 K/UL
LYMPHOCYTES NFR BLD: 21.5 %
MCH RBC QN AUTO: 28.2 PG
MCHC RBC AUTO-ENTMCNC: 31.1 G/DL
MCV RBC AUTO: 91 FL
MONOCYTES # BLD AUTO: 1.1 K/UL
MONOCYTES NFR BLD: 12.4 %
NEUTROPHILS # BLD AUTO: 5.2 K/UL
NEUTROPHILS NFR BLD: 58.6 %
NRBC BLD-RTO: 0 /100 WBC
PLATELET # BLD AUTO: 244 K/UL
PMV BLD AUTO: 10.9 FL
POCT GLUCOSE: 223 MG/DL (ref 70–110)
POCT GLUCOSE: 246 MG/DL (ref 70–110)
POCT GLUCOSE: 282 MG/DL (ref 70–110)
POTASSIUM SERPL-SCNC: 4.6 MMOL/L
RBC # BLD AUTO: 3.87 M/UL
SODIUM SERPL-SCNC: 136 MMOL/L
WBC # BLD AUTO: 8.8 K/UL

## 2018-12-10 PROCEDURE — G8988 SELF CARE GOAL STATUS: HCPCS | Mod: CI

## 2018-12-10 PROCEDURE — 25000003 PHARM REV CODE 250: Performed by: STUDENT IN AN ORGANIZED HEALTH CARE EDUCATION/TRAINING PROGRAM

## 2018-12-10 PROCEDURE — 63600175 PHARM REV CODE 636 W HCPCS: Performed by: NURSE PRACTITIONER

## 2018-12-10 PROCEDURE — 97161 PT EVAL LOW COMPLEX 20 MIN: CPT

## 2018-12-10 PROCEDURE — 94660 CPAP INITIATION&MGMT: CPT

## 2018-12-10 PROCEDURE — 63600175 PHARM REV CODE 636 W HCPCS: Performed by: STUDENT IN AN ORGANIZED HEALTH CARE EDUCATION/TRAINING PROGRAM

## 2018-12-10 PROCEDURE — 85025 COMPLETE CBC W/AUTO DIFF WBC: CPT

## 2018-12-10 PROCEDURE — 97165 OT EVAL LOW COMPLEX 30 MIN: CPT

## 2018-12-10 PROCEDURE — G8989 SELF CARE D/C STATUS: HCPCS | Mod: CI

## 2018-12-10 PROCEDURE — 99233 SBSQ HOSP IP/OBS HIGH 50: CPT | Mod: 57,GC,, | Performed by: SURGERY

## 2018-12-10 PROCEDURE — 80048 BASIC METABOLIC PNL TOTAL CA: CPT

## 2018-12-10 PROCEDURE — 85730 THROMBOPLASTIN TIME PARTIAL: CPT

## 2018-12-10 PROCEDURE — G8987 SELF CARE CURRENT STATUS: HCPCS | Mod: CI

## 2018-12-10 PROCEDURE — 36415 COLL VENOUS BLD VENIPUNCTURE: CPT

## 2018-12-10 PROCEDURE — 99233 SBSQ HOSP IP/OBS HIGH 50: CPT | Mod: GC,,, | Performed by: STUDENT IN AN ORGANIZED HEALTH CARE EDUCATION/TRAINING PROGRAM

## 2018-12-10 PROCEDURE — 99233 SBSQ HOSP IP/OBS HIGH 50: CPT | Mod: ,,, | Performed by: INTERNAL MEDICINE

## 2018-12-10 PROCEDURE — 11000001 HC ACUTE MED/SURG PRIVATE ROOM

## 2018-12-10 RX ORDER — CEFAZOLIN SODIUM 1 G/3ML
2 INJECTION, POWDER, FOR SOLUTION INTRAMUSCULAR; INTRAVENOUS
Status: DISCONTINUED | OUTPATIENT
Start: 2018-12-10 | End: 2018-12-18 | Stop reason: HOSPADM

## 2018-12-10 RX ADMIN — GABAPENTIN 100 MG: 100 CAPSULE ORAL at 07:12

## 2018-12-10 RX ADMIN — INSULIN ASPART 3 UNITS: 100 INJECTION, SOLUTION INTRAVENOUS; SUBCUTANEOUS at 12:12

## 2018-12-10 RX ADMIN — CEFAZOLIN 2 G: 1 INJECTION, POWDER, FOR SOLUTION INTRAMUSCULAR; INTRAVENOUS at 05:12

## 2018-12-10 RX ADMIN — CEFAZOLIN 2 G: 330 INJECTION, POWDER, FOR SOLUTION INTRAMUSCULAR; INTRAVENOUS at 06:12

## 2018-12-10 RX ADMIN — GABAPENTIN 100 MG: 100 CAPSULE ORAL at 04:12

## 2018-12-10 RX ADMIN — INSULIN ASPART 2 UNITS: 100 INJECTION, SOLUTION INTRAVENOUS; SUBCUTANEOUS at 04:12

## 2018-12-10 RX ADMIN — INSULIN ASPART 1 UNITS: 100 INJECTION, SOLUTION INTRAVENOUS; SUBCUTANEOUS at 10:12

## 2018-12-10 RX ADMIN — HEPARIN SODIUM AND DEXTROSE 19 UNITS/KG/HR: 10000; 5 INJECTION INTRAVENOUS at 07:12

## 2018-12-10 RX ADMIN — PRAVASTATIN SODIUM 10 MG: 10 TABLET ORAL at 08:12

## 2018-12-10 RX ADMIN — FUROSEMIDE 40 MG: 40 TABLET ORAL at 08:12

## 2018-12-10 RX ADMIN — FUROSEMIDE 40 MG: 40 TABLET ORAL at 09:12

## 2018-12-10 RX ADMIN — HEPARIN SODIUM AND DEXTROSE 19 UNITS/KG/HR: 10000; 5 INJECTION INTRAVENOUS at 03:12

## 2018-12-10 RX ADMIN — GABAPENTIN 100 MG: 100 CAPSULE ORAL at 10:12

## 2018-12-10 NOTE — PLAN OF CARE
Problem: Physical Therapy Goal  Goal: Physical Therapy Goal  Goals to be met by: 10 days ()    Patient will increase functional independence with mobility by performin. Supine to sit with Set-up [place]  2. Sit to supine with Stand-by Assistance  3. Sit to stand transfer with Supervision  4. Gait  x 100 feet with Stand-by Assistance using Rolling Walker.   5. Lower extremity exercise program x30 reps per handout, with independence to improve muscular endurance and strength.    Outcome: Ongoing (interventions implemented as appropriate)  PT evaluation completed. POC initiated.    Magui Major, PT, DPT  12/10/2018

## 2018-12-10 NOTE — PLAN OF CARE
HANG attempted to contact Abdullahi Archibald [phone]  [facility], however the phone continued to ring without an answer. HANG will continue to follow.     11:00 AM  HANG spoke with Braeden in medical records at [facility] [phone] and refaxed the copy of the patient's SHAHNAZ. Per Braeden, he will send the patient's medical records over within the hour. HANG will continue to follow.     1:45 PM  HANG spoke with Braeden and he reported the patient's chart is extensive and he will try and send the most recent info by 2:00 pm today. HANG will continue to follow.     3:22 PM  HANG received [facility]'s medical records from [facility]. HANG updated the patient's medical team to obtain from . HANG will continue to follow.     Millie Mayer LMSW   - [facility]  Ext.[phone]

## 2018-12-10 NOTE — PLAN OF CARE
ALISON received a call from the Pebbles Osorio [phone] ext [phone] discharge [facility] at the [facility] to offer assistance at discharge disposition, Pebbles was given up to date information about the plan of care, and items that may be needed to assist with discharge. Will continue to update Pebbles as needs arise.

## 2018-12-10 NOTE — ASSESSMENT & PLAN NOTE
· Status post amputation of first toe of right foot on 12/03 at [facility] secondary to either severe PAD with possible super-imposed osteomyelitis.   · [facility] that [facility] course course was complicated by development non-pulsatile blood flow after, as a result podiatry has left wound open. Vascular at [facility] wanted to due angioplasty post-op, but CO2 angiography wasn't available, prompting transfer here. See vascular surgery and podiatry note for images of wound.   · Case discussed with Dr. Gomez in Vascular Surgery here at transfer. Vascular surgery evaluated; recommended repeating DANILO's and tentatively planning for angiogram. DANILO's ordered; only showing mild PAD. Angiogram scheduled for 12/11; NPO at midnight.   · Exam notable for diminished pulses in right foot and lower extremity. .   · Will resume heparin drip at low intensity for anticoagulation with new Afib.   · Wound care consulted; deferred care recommendations to podiatry.   · Tylenol PRN changed to Gabapentin 100 mg TID with improved control.   · PT/OT ordered.

## 2018-12-10 NOTE — ASSESSMENT & PLAN NOTE
· Improved.   · Unclear of baseline; no outside records available. May prevent patient from receiving regular angiogram vs with CO2.   · Could be acute, but patient with history of poorly controlled T2DM.   · BUN 37, Cr 1.6, and GFR 40 on admission.    · Holding home Lisinopril in setting of possible STACY.   · Renal function plateau with GFR at 47 last few days, after day of LR maintenance.  · BMP daily.

## 2018-12-10 NOTE — PROGRESS NOTES
[facility]  Vascular Surgery  Progress Note    Patient Name: Jai Godinez  MRN: [id number]  Admission Date: 12/6/2018  Primary Care Provider: Pepe Valverde MD    Subjective:     Interval History: NAEON. AF VSS. Cr stable at 1.5.     Post-Op Info:  Procedure(s) (LRB):  Angiogram Extremity Unilateral - L CFA access, RLE angio with CO2 contrast (N/A)           Medications:  Continuous Infusions:   heparin (porcine) in D5W 19 Units/kg/hr (12/10/18 0355)     Scheduled Meds:   ceFAZolin (ANCEF) IVPB  2 g Intravenous Q8H    furosemide  40 mg Oral BID    pravastatin  10 mg Oral Daily     PRN Meds:acetaminophen, acetaminophen, acetaminophen, dextrose 50%, dextrose 50%, gabapentin, glucagon (human recombinant), glucose, glucose, heparin (PORCINE), heparin (PORCINE), heparin, porcine (PF), insulin aspart U-100, sodium chloride 0.9%     Objective:     Vital Signs (Most Recent):  Temp: 98.1 °F (36.7 °C) (12/10/18 0358)  Pulse: 61 (12/10/18 0358)  Resp: 20 (12/10/18 0358)  BP: 123/61 (12/10/18 0358)  SpO2: 96 % (12/10/18 0358) Vital Signs (24h Range):  Temp:  [97.1 °F (36.2 °C)-98.1 °F (36.7 °C)] 98.1 °F (36.7 °C)  Pulse:  [52-83] 61  Resp:  [16-20] 20  SpO2:  [93 %-96 %] 96 %  BP: (115-131)/(56-61) 123/61          Physical Exam   Constitutional: He is oriented to person, place, and time. He appears well-developed and well-nourished. No distress.   HENT:   Head: Normocephalic and atraumatic.   Eyes: No scleral icterus.   Neck: Neck supple.   Cardiovascular: Normal rate.   L- 2+ palpable femoral, popliteal, DP, PT  R- 2+ femoral, 1+ popliteal, non palpable DP, PT   Pulmonary/Chest: Effort normal. No respiratory distress.   Abdominal: Soft.   Musculoskeletal: He exhibits no edema.   Neurological: He is alert and oriented to person, place, and time.   Skin: Skin is warm.       Significant Labs:  CBC:   Recent Labs   Lab 12/10/18  0449   WBC 8.80   RBC 3.87*   HGB 10.9*   HCT 35.1*      MCV 91   MCH  28.2   MCHC 31.1*     CMP:   Recent Labs   Lab 12/10/18  0449   *   CALCIUM 9.7      K 4.6   CO2 23      BUN 25*   CREATININE 1.5*     Coagulation:   Recent Labs   Lab 12/10/18  0449   APTT 41.3*       Significant Diagnostics:  I have reviewed and interpreted all pertinent imaging results/findings within the past 24 hours.    Assessment/Plan:     * Amputated great toe of right foot    78 y M with h/o 4v bypass (2001), HFrEF (EF 45-50%), T2DM (A1c 8.1), HTN, SANDIE (on CPAP nightly), diverticulosis and newly diagnosed afib. Pt presented with osteomyelitis of R great toe s/p amputation. Wound was left open due to concern for lack of in-line flow to foot. His Cr is elevated at 1.6 so he was transferred here for possible CO2 angiogram. He is on a heparin gtt for newly diagnosed afib    - cardiac diet currently; NPO at midnight for CO2 angiogram  - anticoagulation per primary: heparin gtt   - Abx per primary: Ancef 2g q8h  - Cr stable at 1.5 (from 1.4); will plan on CO2 angiogram on 12/11         Magdiel Roman MD  Vascular Surgery  [facility]

## 2018-12-10 NOTE — PT/OT/SLP EVAL
Physical Therapy Evaluation    Patient Name:  Jai Godinez   MRN:  [id number]    Recommendations:     Discharge Recommendations:  ( PT)   Discharge Equipment Recommendations: walker, rolling   Barriers to discharge: None    Assessment:     Jai Godinez is a 78 y.o. male admitted with a medical diagnosis of Amputated great toe of right foot.  He presents with the following impairments/functional limitations:  weakness, impaired endurance, impaired balance, decreased lower extremity function, decreased safety awareness, gait instability, orthopedic precautions.  During today's session, pt with heavy reliance on (B) HHA to assist with safety; utilization of RW necessary for safety of performance. Prior to admit, pt MOD I with [facility].  Pt would benefit from discharge to  PT once medically appropriate.    Rehab Prognosis: Good; patient would benefit from acute skilled PT services to address these deficits and reach maximum level of function.    Recent Surgery: Procedure(s) (LRB):  Angiogram Extremity Unilateral - L CFA access, RLE angio with CO2 contrast (N/A)      Plan:     During this hospitalization, patient to be seen 3 x/week to address the identified rehab impairments via gait training, therapeutic activities, therapeutic exercises, neuromuscular re-education and progress toward the following goals:    GOALS:   Multidisciplinary Problems     Physical Therapy Goals        Problem: Physical Therapy Goal    Goal Priority Disciplines Outcome Goal Variances Interventions   Physical Therapy Goal     PT, PT/OT Ongoing (interventions implemented as appropriate)     Description:  Goals to be met by: 10 days ()    Patient will increase functional independence with mobility by performin. Supine to sit with Set-up [place]  2. Sit to supine with Stand-by Assistance  3. Sit to stand transfer with Supervision  4. Gait  x 100 feet with Stand-by Assistance using Rolling Walker.   5. Lower extremity exercise  "program x30 reps per handout, with independence to improve muscular endurance and strength.  6. Pt will ambulate 5 steps with R HR and CGA                      · Plan of Care Expires:  01/09/19    Subjective     Chief Complaint: fatigue with activity  Patient/Family Comments/goals: "I just want to go home" per pt during session.   Pain/Comfort:  · Pain Rating 1: 0/10    Patients cultural, spiritual, Advent conflicts given the current situation:      Living Environment:  Patient History:  · Home environment: lives with wife in one story home c/ 5 YOANNA and R HR  · Assistance provided:  wife  · Bathroom set up:  Walk in shower with built in seat    Previous Level of Mobilty  · Transfers: MOD I with [facility]  · Gait: MOD I with [facility] for HH     Additional Roles and Hx of Patient  · Working: retired  · Driving: no  · Hobbies:fishing and attending [employer]  · Hearing or Vision Deficit: [place], contacts  · Hx of Falls: none    DME: [facility]  - Bold implies equipment being used    Objective:     Communicated with nsg prior to session.  Patient found with peripheral IV, SCD, telemetry  upon PT entry to room.    General Precautions: Standard, fall   Orthopedic Precautions:N/A   Braces: (Darco shoe)       Exams:  Cognitive Exam  Patient is oriented to Person, Place, Time and Situation and follows 100% of multi-step commands    Fine Motor Coordination    -       Intact   Postural Exam Patient presented with the following abnormalities:    -       Rounded shoulders  -       Forward head   Sensation    -       Intact   Skin Integrity/Edema     -       Skin integrity: Visible skin intact   R LE ROM WFL   R LE Strength  WFL   L LE ROM WFL   L LE Strength  WFL       Functional Mobility  Bed Mobility  Supine to Sit: contact guard assistance   Sit to Supine: contact guard assistance   Transfers Sit to Stand:  contact guard assistance with no AD for 1 trials     Gait Gait Distance: 60 ft; (B) HHA for 10 ft and RW for 50 ft  Assistance Level: " contact guard assistance  Description: slowed ray with step to gait pattern, incr trunk flexion occasionally for compensation of R leg pain         Balance   Static Sitting contact guard assistance   Dynamic Sitting contact guard assistance   Static Standing contact guard assistance   Dynamic Standing contact guard assistance         Therapeutic Activities and Exercises:   PT educated pt on the following  - role of PT  - PT POC (including frequency and duration while in hospital)  - discharge recommendation (HH PT) and equipment needs (RW)  - level of assistance currently req (1 person assistance and RW)and safety precautions with [facility] staff   All questions and concerns answered and addressed. White board updated with pertinent information. Nsg notified.       AM-PAC 6 CLICK MOBILITY  Total Score:18     Patient left up in chair with all lines intact and call button in reach.    GOALS:   Multidisciplinary Problems     Physical Therapy Goals        Problem: Physical Therapy Goal    Goal Priority Disciplines Outcome Goal Variances Interventions   Physical Therapy Goal     PT, PT/OT Ongoing (interventions implemented as appropriate)     Description:  Goals to be met by: 10 days ()    Patient will increase functional independence with mobility by performin. Supine to sit with Set-up [place]  2. Sit to supine with Stand-by Assistance  3. Sit to stand transfer with Supervision  4. Gait  x 100 feet with Stand-by Assistance using Rolling Walker.   5. Lower extremity exercise program x30 reps per handout, with independence to improve muscular endurance and strength.                      History:     Past Medical History:   Diagnosis Date    CHF (congestive heart failure)     Coronary artery disease     Diabetes mellitus     Diverticulosis     Hx of CABG     Hypertension        Past Surgical History:   Procedure Laterality Date    APPENDECTOMY      BYPASS GRAFT         Clinical Decision Making:      History  Co-morbidities and personal factors that may impact the plan of care Examination  Body Structures and Functions, activity limitations and participation restrictions that may impact the plan of care Clinical Presentation   Decision Making/ Complexity Score   Co-morbidities:   [] Time since onset of injury / illness / exacerbation  [x] Status of current condition  []Patient's cognitive status and safety concerns    [] Multiple Medical Problems (see med hx)  Personal Factors:   [] Patient's age  [] Prior Level of function   [] Patient's home situation (environment and family support)  [] Patient's level of motivation  [] Expected progression of patient      HISTORY:(criteria)    [] 14848 - no personal factors/history    [x] 10448 - has 1-2 personal factor/comorbidity     [] 16784 - has >3 personal factor/comorbidity     Body Regions:  [] Objective examination findings  [] Head     []  Neck  [] Trunk   [] Upper Extremity  [x] Lower Extremity    Body Systems:  [] For communication ability, affect, cognition, language, and learning style: the assessment of the ability to make needs known, consciousness, orientation (person, place, and time), expected emotional /behavioral responses, and learning preferences (eg, learning barriers, education  needs)  [] For the neuromuscular system: a general assessment of gross coordinated movement (eg, balance, gait, locomotion, transfers, and transitions) and motor function  (motor control and motor learning)  [] For the musculoskeletal system: the assessment of gross symmetry, gross range of motion, gross strength, height, and weight  [] For the integumentary system: the assessment of pliability(texture), presence of scar formation, skin color, and skin integrity  [] For cardiovascular/pulmonary system: the assessment of heart rate, respiratory rate, blood pressure, and edema     Activity limitations:    [] Patient's cognitive status and saf ety concerns          [x]  Status of current condition      [x] Weight bearing restriction  [] Cardiopulmunary Restriction    Participation Restrictions:   [] Goals and goal agreement with the patient     [] Rehab potential (prognosis) and probable outcome      Examination of Body System: (criteria)    [] 44846 - addressing 1-2 elements    [x] 46612 - addressing a total of 3 or more elements     [] 97363 -  Addressing a total of 4 or more elements         Clinical Presentation: (criteria)  Stable - 03276     On examination of body system using standardized tests and measures patient presents with 3 or more elements from any of the following: body structures and functions, activity limitations, and/or participation restrictions.  Leading to a clinical presentation that is considered stable and/or uncomplicated                              Clinical Decision Making  (Eval Complexity):  Low- 25059     Time Tracking:     PT Received On: 12/10/18  PT Start Time: 0929     PT Stop Time: 0953  PT Total Time (min): 24 min     Billable Minutes: Evaluation 15 and Therapeutic Activity 9      Magui Major, PT  12/10/2018

## 2018-12-10 NOTE — PROGRESS NOTES
[facility] Hospital Medicine  Progress Note    Patient Name: Jai Godinez  MRN: [id number]  Patient Class: IP- Inpatient   Admission Date: 12/6/2018  Length of Stay: 4 days  Attending Physician: Tess Garcia MD  Primary Care Provider: Pepe Valverde MD    [facility] Medicine Team: [facility] HOSP MED 5 Miguel Angel Jones MD    Subjective:     Principal Problem:Amputated great toe of right foot    HPI:  This is a 78 year old female transferred from the [facility] for vascular surgery repair of right great toe status post amputation. He has a PMH of quadruple bypass (2001), severe peripheral artery disease, HFrEF (EF 45-50%), T2DM (A1c 8.1), HTN, SANDIE (on CPAP nightly) and diverticulosis.     He was hospitalized in [place] from 11/14 to 11/21 for acute on chronic heart failure exacerbation; discharge home with resolution of symptoms. He then developed one day of progressively worsening swelling of the right great toe associated with erythematous skin changes; for relief, he cut the skin of the toe with his pocket knife, at which point noticed purulent discharge, [facility]     Patient underwent right toe amputation done on 12/3 for pre-operative DANILO indicative of severe PAD. His post-operative course was complicated by development non-pulsatile blood flow after, as a result podiatry has left wound open. Vascular  at [facility] wanted to due angioplasty post-op, but CO2 angiography wasn't available. Also, concern for possible right foot osteomyelitis with wound cultures growing MSSA and Group G Streptococcus; blood cultures negative; ID there treated with Ceftriaxone while in-patient, with plans to discharge on Doxycycline.  In addition, on presentation, he was noted to have new development of Afib with slow ventricular response; rate control with Metoprolol and started on heparin drip while inpatient; transitioned to Dabigatran prior to transfer.     Patient/family requested transfer  elsewhere to accomplish procedure. [facility] Transfer center contacted today and case was discussed with Dr. Patricia Sweeney,  who requested patient to transfer to hospital medicine with vascular surgery consults.        On arrival here, he reports episodic cramping pain isolated to the amputation site occurring approximately Q5min. Pain relieved by taking Tylenol PRN. Denies fevers, chills, chest pain, SOB, palpitations, numbness of extremities.         Hospital Course:  12/07: No acute events overnight. Vascular surgery assessed this morning; recommended repeat DANILO and tentative angiogram today. Wound care and podiatry consulted for open wound and right foot osteomyelitis.  Wound care defering to podiatry. Podiatry repeating cultures and XR of foot. ID consulted for antibiotic recs; Ceftriaxone changed to Cefazolin (day #1). LR maintenance started to attempt optimization of kidney function; d/c home Lisinopril. [facility].   12/08: No events overnight. DANILO showing mild PAD. Scheduled for angiogram 12/12. BUN and Cr improved to 29 and 1.4 with maintenance LR; GFR 47. ESR 93, CRP 75. Day #2 of Cefazolin.   12/09: No events overnight. Day #3 of Cefazolin. Renal function same. Tylenol PRN changed to Gabapentin with pain improvement.   12/10: No events overnight. Day #4 of Cefazolin. Renal function same. Evaluated by Vascular; angiogram moved up to tomorrow. NPO at midnight.     Interval History: Patient and wife seen at bedside this AM. They had no complaints. They report patient seemed happier after receiving wheelchair to use around hospital and get out of room. He is tolerating PO. Urinating without difficulty. Pain improved with Gabapentin PRN.      Review of Systems   Constitutional: Negative for appetite change, chills, diaphoresis, fatigue and fever.   HENT: Negative for congestion, sore throat and trouble swallowing.    Eyes: Negative for photophobia, pain and discharge.   Respiratory:  Negative for cough, chest tightness and shortness of breath.    Cardiovascular: Negative for chest pain, palpitations and leg swelling.   Gastrointestinal: Negative for abdominal pain, diarrhea, nausea and vomiting.   Genitourinary: Negative for decreased urine volume, difficulty urinating and urgency.   Musculoskeletal: Positive for gait problem. Negative for back pain, myalgias and neck pain.   Skin: Positive for wound. Negative for pallor and rash.   Neurological: Negative for dizziness, weakness, light-headedness, numbness and headaches.     Objective:     Vital Signs (Most Recent):  Temp: 98.1 °F (36.7 °C) (12/10/18 0358)  Pulse: 61 (12/10/18 0358)  Resp: 20 (12/10/18 0358)  BP: 123/61 (12/10/18 0358)  SpO2: 96 % (12/10/18 0358) Vital Signs (24h Range):  Temp:  [97.1 °F (36.2 °C)-98.1 °F (36.7 °C)] 98.1 °F (36.7 °C)  Pulse:  [52-83] 61  Resp:  [16-20] 20  SpO2:  [93 %-96 %] 96 %  BP: (123-131)/(56-61) 123/61     Weight: 104.3 kg (230 lb)  Body mass index is 33.97 kg/m².  No intake or output data in the 24 hours ending 12/10/18 0922   Physical Exam   Constitutional: He is oriented to person, place, and time. He appears well-developed and well-nourished. No distress.   HENT:   Head: Normocephalic and atraumatic.   Mouth/Throat: Uvula is midline, oropharynx is clear and moist and mucous membranes are normal.   Eyes: Conjunctivae are normal. Pupils are equal, round, and reactive to light.   Neck: No JVD present.   Cardiovascular: Normal rate and normal heart sounds. An irregularly irregular rhythm present.   No murmur heard.  Pulses:       Radial pulses are 2+ on the right side, and 2+ on the left side.        Dorsalis pedis pulses are 1+ on the right side, and 2+ on the left side.   Pulmonary/Chest: Effort normal and breath sounds normal. No respiratory distress.   Abdominal: Soft. Normal appearance and bowel sounds are normal. He exhibits no distension. There is no tenderness.   Musculoskeletal:   Extensive 3 cm  open wound on right foot near site of 1st toe amputation with exposure of underlying bones. No purulent discharge. Dressing clean, dry, intact.    Neurological: He is alert and oriented to person, place, and time.   Skin: Skin is warm and dry. No bruising and no rash noted.       Significant Labs:   CBC:   Recent Labs   Lab 12/09/18 0430 12/10/18  0449   WBC 7.77 8.80   HGB 10.6* 10.9*   HCT 33.7* 35.1*    244     CMP:   Recent Labs   Lab 12/09/18 0430 12/10/18  0449    136   K 4.4 4.6    103   CO2 22* 23   * 176*   BUN 27* 25*   CREATININE 1.4 1.5*   CALCIUM 9.3 9.7   ANIONGAP 11 10   EGFRNONAA 47.4* 43.7*     All pertinent labs within the past 24 hours have been reviewed.    Significant Imaging: I have reviewed all pertinent imaging results/findings within the past 24 hours.  I have reviewed and interpreted all pertinent imaging results/findings within the past 24 hours.    Assessment/Plan:      * Amputated great toe of right foot    · Status post amputation of first toe of right foot on 12/03 at [facility] secondary to either severe PAD with possible super-imposed osteomyelitis.   · [facility] that [facility] course was complicated by development non-pulsatile blood flow after, as a result podiatry has left wound open. Vascular at [facility] wanted to due angioplasty post-op, but CO2 angiography wasn't available, prompting transfer here. See vascular surgery and podiatry note for images of wound.   · Case discussed with Dr. Gomez in Vascular Surgery here at transfer. Vascular surgery evaluated; recommended repeating DANILO's and tentatively planning for angiogram. DANILO's ordered; only showing mild PAD. Angiogram scheduled for 12/11; NPO at midnight.   · Exam notable for diminished pulses in right foot and lower extremity. .   · Will resume heparin drip at low intensity for anticoagulation with new Afib.   · Wound care consulted; deferred care recommendations to podiatry.   · Tylenol PRN  changed to Gabapentin 100 mg TID with improved control.   · PT/OT ordered.        Open wound of right foot    See assessment for right great toe amputation and right foot osteomyelitis.        Stage 3 chronic kidney disease    · Improved.   · Unclear of baseline; no outside records available. May prevent patient from receiving regular angiogram vs with CO2.   · Could be acute, but patient with history of poorly controlled T2DM.   · BUN 37, Cr 1.6, and GFR 40 on admission.    · Holding home Lisinopril in setting of possible STACY.   · Renal function plateau with GFR at 47 last few days, after day of LR maintenance.  · BMP daily.           Osteomyelitis of right foot    · Concern for possible right foot osteomyelitis with wound cultures growing MSSA and Group G Streptococcus; blood cultures negative; ID there treated with Ceftriaxone while in-patient, with plans to discharge on Doxycycline. [facility].   · Unclear if patient had imaging studies suggesting or confirming.   · Afebrile. No leukocytosis. ESR 75. CRP 93.    · Blood cultures ordered.   · ID consulted for recommendations regarding correct choice of antibiotic at [facility] and anticipated stop date of course. Status post one day of Ceftriaxone. Day #4 of Cefazolin per ID recs.   · Podiatry consulted; wound cultures and XR of foot ordered. Wound to be dressed with Betadyne and Kerflex for now. Recommending likely wound vac at discharge.        Atrial fibrillation with slow ventricular response    · Irregular irregular rhythm on exam with HR controlled in lower 60's.   · Will hold home Metoprolol for now.   · Started on heparin infusion at [facility] with plans to transition with Dabigatran.   · INR 1.2 on admission.   · Will start heparin infusion here with low intensity and follow aPTT. APTT 41 today.        Chronic systolic heart failure    · Last EF 40-50% at [facility] per transfer note.   · No signs of fluid overload on exam.   · Managed with Lasix 40  mg BID, Lisinopril 40 mg, and Metoprolol daily.   · Holding Metoprolol temporarily with HR on admission in lower 60's and Lisinopril with possible STACY.  Lisinopril should be changed to ARB on discharge given history of chronic dry cough and worsening systemic rash after Lisinopril dose was increased some months ago.  · Will order cardiac and diabetic diet.         Essential hypertension    · Controlled.   · Resuming Lasix, and holding Metoprolol with HR in lower 60's.   · Discontinued home Lisinopril because of concern for possible acute on chronic STACY, but patient and wife reports history of dry cough and worsening systemic erythematous rash after Lisinopril dose was increased some months ago. Will enter as allergy in patient's chart.   · Will likely discharge on ARB instead of ACE-I.        Type 2 diabetes mellitus, without long-term current use of insulin    · Last A1c 8 at outside facility.   · Holding home Metformin and Glipizide  · Will order low dose sliding scale on admission.        Peripheral arterial disease    See assessment for amputation of great right toe.          VTE Risk Mitigation (From admission, onward)        Ordered     heparin, porcine (PF) 100 unit/mL injection flush 10 Units  As needed (PRN)      12/06/18 2119     heparin 25,000 units in dextrose 5% 250 mL (100 units/mL) infusion LOW INTENSITY nomogram - [facility]  Continuous      12/06/18 1937     heparin 25,000 units in dextrose 5% (100 units/ml) IV bolus from bag - ADDITIONAL PRN BOLUS - 30 units/kg  As needed (PRN)      12/06/18 1937     heparin 25,000 units in dextrose 5% (100 units/ml) IV bolus from bag - ADDITIONAL PRN BOLUS - 60 units/kg  As needed (PRN)      12/06/18 1937     IP VTE HIGH RISK PATIENT  Once      12/06/18 1859              Miguel Angel Jones MD  Department of Hospital Medicine   [facility]

## 2018-12-10 NOTE — PT/OT/SLP EVAL
"Occupational Therapy   Evaluation and Discharge Note    Name: Jai Godinez  MRN: [id number]  Admitting Diagnosis:  Amputated great toe of right foot      Recommendations:     Discharge Recommendations: other (see comments)([facility] w/ [facility] ([facility]))  Discharge Equipment Recommendations:  cane, quad, dressing device  Barriers to discharge:  None    History:     Occupational Profile:  Living Environment: Pt lives w/ spouse in a [facility] w/ 5-6 YOANNA and RS rail.  They have a walk-in Shower w/ 6" threshold, and a built in seat.   Previous level of function: Independent w/ ADLs/IADLs  Roles and Routines: , Casino enthusiast.  Equipment Used at home:     Assistance upon Discharge: Spouse works from 4 am to 1 pm.     Past Medical History:   Diagnosis Date    CHF (congestive heart failure)     Coronary artery disease     Diabetes mellitus     Diverticulosis     Hx of CABG     Hypertension        Past Surgical History:   Procedure Laterality Date    APPENDECTOMY      BYPASS GRAFT         Subjective     Chief Complaint: Foot pain  Patient/Family Comments/goals: successfully complete procedure, safely discharge to next level of care.     Pain/Comfort:  · Pain Rating 1: 0/10  · Pain Addressed 1: Pre-medicate for activity  · Pain Rating Post-Intervention 1: 0/10    Patients cultural, spiritual, Adventist conflicts given the current situation: no    Objective:     Communicated with: RN prior to session.  Patient found supine all lines intact, call button in reach and spouse present and peripheral IV, SCD, telemetry upon OT entry to room.    General Precautions: Standard, fall   Orthopedic Precautions:Full weight bearing(Darco shoe in room)   Braces: N/A     Occupational Performance:    Bed Mobility:    · Patient completed Rolling/Turning to Left with  modified independence  · Patient completed Scooting/Bridging with modified independence  · Patient completed Supine to Sit with modified independence    Functional " Mobility/Transfers:  · Patient completed Sit <> Stand Transfer with modified independence  with  quad cane   · Patient completed Bed <> Chair Transfer using Step Transfer technique with modified independence with rolling walker  · Functional Mobility: Walked to room door w/ quad cane, switched to [facility] for safety and walked residential to nurses station and returned to chair.  See PT note for distance    Activities of Daily Living:  · Lower Body Dressing: maximal assistance Unable to tolerate figure 4 position to don darco shoe, unable to fully bend at the waist and reach socks, but able to hold out feet.     Cognitive/Visual Perceptual:  Cognitive/Psychosocial Skills:     -       Oriented to: Person, Place, Time and Situation   -       Follows Commands/attention:Follows multistep  commands  -       Communication: clear/fluent  -       Memory: No Deficits noted  -       Safety awareness/insight to disability: intact   -       Mood/Affect/Coping skills/emotional control: Appropriate to situation  Visual/Perceptual:      -Intact w/ contacts on during eval    Physical Exam:  Balance:    -       grossly good  Skin integrity: Wound RLE distal foot, clean full dressing w/ no seepage.   Edema:  Mild noted in all extremities.   Dominant hand:    -       RH  Upper Extremity Range of Motion:     -       Right Upper Extremity: WFL  -       Left Upper Extremity: WFL  Upper Extremity Strength:    -       Right Upper Extremity: WFL  -       Left Upper Extremity: WFL   Strength:    -       Right Upper Extremity: WFL  -       Left Upper Extremity: WFL  Gross motor coordination:   WFL    AMPAC 6 Click ADL:  AMPAC Total Score: 21    Treatment & Education:  -Pt edu on OT role/POC  -Importance of OOB activity with staff assistance  -Safety during functional t/f and mobility  - White board updated  - Multiple self care tasks/functional mobility completed-- assistance level noted above  - All questions/concerns answered within OT scope  "of practice    Education:    Patient left up in chair with all lines intact, call button in reach and spouse present    Assessment:     Jai Godinez is a 78 y.o. male with a medical diagnosis of Amputated great toe of right foot. At this time, patient is functioning at their prior level of function and does not require further acute OT services.     Clinical Decision Makin.  OT Low:  "Pt evaluation falls under low complexity for evaluation coding due to performance deficits noted in 1-3 areas as stated above and 0 co-morbities affecting current functional status. Data obtained from problem focused assessments. No modifications or assistance was required for completion of evaluation. Only brief occupational profile and history review completed."     Plan:     During this hospitalization, patient does not require further acute OT services.  Please re-consult if situation changes.    · Plan of Care Reviewed with: patient, spouse    This Plan of care has been discussed with the patient who was involved in its development and understands and is in agreement with the identified goals and treatment plan    GOALS:   Multidisciplinary Problems     Occupational Therapy Goals     Not on file          Multidisciplinary Problems (Resolved)        Problem: Occupational Therapy Goal    Goal Priority Disciplines Outcome Interventions   Occupational Therapy Goal   (Resolved)     OT, PT/OT Outcome(s) achieved    Description:  Eval complete, no OT needs at this time.  Safe to return home when medically clear.  Reorder OT if status deteriorates.                    Time Tracking:     OT Date of Treatment: 12/10/18  OT Start Time: 1000  OT Stop Time: 1015  OT Total Time (min): 15 min    Billable Minutes:Evaluation 15 mins    Gibran Jacques, OT  12/10/2018    "

## 2018-12-10 NOTE — SUBJECTIVE & OBJECTIVE
Interval History:   NAEON  [facility]   CO2 angiogram tomorrow    Review of Systems   Constitutional: Negative for activity change, appetite change, chills, diaphoresis, fatigue and fever.   HENT: Negative.    Eyes: Negative.    Respiratory: Negative for cough, chest tightness and shortness of breath.    Cardiovascular: Positive for leg swelling. Negative for chest pain and palpitations.   Gastrointestinal: Negative for abdominal distention, abdominal pain, diarrhea, nausea and vomiting.   Genitourinary: Negative for difficulty urinating and dysuria.   Musculoskeletal: Negative for back pain, joint swelling, myalgias and neck pain.        Cramping pain right foot   Skin: Positive for wound. Negative for color change, pallor and rash.   Neurological: Negative for dizziness and headaches.   Hematological: Negative.    Psychiatric/Behavioral: Negative for agitation and confusion.     Objective:     Vital Signs (Most Recent):  Temp: 97.7 °F (36.5 °C) (12/10/18 0740)  Pulse: (!) 55 (12/10/18 0740)  Resp: 18 (12/10/18 0740)  BP: (!) 115/55 (12/10/18 0740)  SpO2: 95 % (12/10/18 0740) Vital Signs (24h Range):  Temp:  [97.1 °F (36.2 °C)-98.1 °F (36.7 °C)] 97.7 °F (36.5 °C)  Pulse:  [52-83] 55  Resp:  [16-20] 18  SpO2:  [93 %-96 %] 95 %  BP: (115-131)/(55-61) 115/55     Weight: 104.3 kg (230 lb)  Body mass index is 33.97 kg/m².    Estimated Creatinine Clearance: 47.5 mL/min (A) (based on SCr of 1.5 mg/dL (H)).    Physical Exam   Constitutional: He is oriented to person, place, and time. He appears well-developed and well-nourished. No distress.   HENT:   Head: Normocephalic and atraumatic.   Poor dentition    Eyes: Conjunctivae are normal. No scleral icterus.   Neck: Normal range of motion.   Cardiovascular: Normal rate. An irregularly irregular rhythm present.   No murmur heard.  Pulmonary/Chest: Effort normal and breath sounds normal. No respiratory distress. He has no wheezes.   Abdominal: Soft. He exhibits  no distension. There is no tenderness.   Musculoskeletal: He exhibits no edema.   Right foot wrapped.  Old drainage noted.   See Podiatry Photos.  Noted to have exposed bone  No ascending erythema   Neurological: He is alert and oriented to person, place, and time.   Skin: Skin is warm and dry. He is not diaphoretic.   Psychiatric: He has a normal mood and affect. His behavior is normal.   Vitals reviewed.          Significant Labs:   Blood Culture:   Recent Labs   Lab 12/06/18 1951   LABBLOO No Growth to date  No Growth to date  No Growth to date  No Growth to date  No Growth to date  No Growth to date  No Growth to date  No Growth to date     CBC:   Recent Labs   Lab 12/09/18  0430 12/10/18  0449   WBC 7.77 8.80   HGB 10.6* 10.9*   HCT 33.7* 35.1*    244     CMP:   Recent Labs   Lab 12/09/18  0430 12/10/18  0449    136   K 4.4 4.6    103   CO2 22* 23   * 176*   BUN 27* 25*   CREATININE 1.4 1.5*   CALCIUM 9.3 9.7   ANIONGAP 11 10   EGFRNONAA 47.4* 43.7*     Wound Culture:   Recent Labs   Lab 12/07/18  1122   LABAERO No growth     All pertinent labs within the past 24 hours have been reviewed.    Significant Imaging: I have reviewed all pertinent imaging results/findings within the past 24 hours.

## 2018-12-10 NOTE — SUBJECTIVE & OBJECTIVE
Interval History: Patient and wife seen at bedside this AM. They had no complaints. They report patient seemed happier after receiving wheelchair to use around hospital and get out of room. He is tolerating PO. Urinating without difficulty. Pain improved with Gabapentin PRN.      Review of Systems   Constitutional: Negative for appetite change, chills, diaphoresis, fatigue and fever.   HENT: Negative for congestion, sore throat and trouble swallowing.    Eyes: Negative for photophobia, pain and discharge.   Respiratory: Negative for cough, chest tightness and shortness of breath.    Cardiovascular: Negative for chest pain, palpitations and leg swelling.   Gastrointestinal: Negative for abdominal pain, diarrhea, nausea and vomiting.   Genitourinary: Negative for decreased urine volume, difficulty urinating and urgency.   Musculoskeletal: Positive for gait problem. Negative for back pain, myalgias and neck pain.   Skin: Positive for wound. Negative for pallor and rash.   Neurological: Negative for dizziness, weakness, light-headedness, numbness and headaches.     Objective:     Vital Signs (Most Recent):  Temp: 98.1 °F (36.7 °C) (12/10/18 0358)  Pulse: 61 (12/10/18 0358)  Resp: 20 (12/10/18 0358)  BP: 123/61 (12/10/18 0358)  SpO2: 96 % (12/10/18 0358) Vital Signs (24h Range):  Temp:  [97.1 °F (36.2 °C)-98.1 °F (36.7 °C)] 98.1 °F (36.7 °C)  Pulse:  [52-83] 61  Resp:  [16-20] 20  SpO2:  [93 %-96 %] 96 %  BP: (123-131)/(56-61) 123/61     Weight: 104.3 kg (230 lb)  Body mass index is 33.97 kg/m².  No intake or output data in the 24 hours ending 12/10/18 0922   Physical Exam   Constitutional: He is oriented to person, place, and time. He appears well-developed and well-nourished. No distress.   HENT:   Head: Normocephalic and atraumatic.   Mouth/Throat: Uvula is midline, oropharynx is clear and moist and mucous membranes are normal.   Eyes: Conjunctivae are normal. Pupils are equal, round, and reactive to light.   Neck: No  JVD present.   Cardiovascular: Normal rate and normal heart sounds. An irregularly irregular rhythm present.   No murmur heard.  Pulses:       Radial pulses are 2+ on the right side, and 2+ on the left side.        Dorsalis pedis pulses are 1+ on the right side, and 2+ on the left side.   Pulmonary/Chest: Effort normal and breath sounds normal. No respiratory distress.   Abdominal: Soft. Normal appearance and bowel sounds are normal. He exhibits no distension. There is no tenderness.   Musculoskeletal:   Extensive 3 cm open wound on right foot near site of 1st toe amputation with exposure of underlying bones. No purulent discharge. Dressing clean, dry, intact.    Neurological: He is alert and oriented to person, place, and time.   Skin: Skin is warm and dry. No bruising and no rash noted.       Significant Labs:   CBC:   Recent Labs   Lab 12/09/18 0430 12/10/18  0449   WBC 7.77 8.80   HGB 10.6* 10.9*   HCT 33.7* 35.1*    244     CMP:   Recent Labs   Lab 12/09/18 0430 12/10/18  0449    136   K 4.4 4.6    103   CO2 22* 23   * 176*   BUN 27* 25*   CREATININE 1.4 1.5*   CALCIUM 9.3 9.7   ANIONGAP 11 10   EGFRNONAA 47.4* 43.7*     All pertinent labs within the past 24 hours have been reviewed.    Significant Imaging: I have reviewed all pertinent imaging results/findings within the past 24 hours.  I have reviewed and interpreted all pertinent imaging results/findings within the past 24 hours.

## 2018-12-10 NOTE — ANESTHESIA PREPROCEDURE EVALUATION
[facility]  Anesthesia Pre-Operative Evaluation         Patient Name: Jai Godinez  YOB: 1940  MRN: [id number]    SUBJECTIVE:     Pre-operative evaluation for Procedure(s) (LRB):  Angiogram Extremity Unilateral - L CFA access, RLE angio with CO2 contrast (N/A)     12/10/2018    Jai Godinez is a 78 y.o. male w/ a significant PMHx of T2DM, CHF, PAD, and CKD III who presents as a transfer from [facility] for Vascular Surgery evaluation s/p right great toe amputation on 12/03/2018. Post-op course complicated by loss of blood flow and wound was subsequently left open. Transferred here for CO2 angio 2/2 to CKD stage III. There is also concern for osteomyelitis. Wound cultures/bone cultures from [facility] showed MSSA and Group G strep.    Patient now presents for the above procedure(s).       LDA:       Peripheral IV - Single Lumen 12/06/18 1800 Right Wrist (Active)   Site Assessment Clean;Dry;Intact;No redness;No swelling 12/8/2018  8:00 PM   Line Status Saline locked 12/8/2018  8:00 PM   Dressing Status Clean;Dry;Intact 12/8/2018  8:00 PM   Number of days: 3            Peripheral IV - Single Lumen 12/06/18 2345 Right;Anterior Forearm (Active)   Site Assessment Clean;Dry;Intact;No redness;No swelling 12/8/2018  8:00 PM   Line Status Saline locked 12/8/2018  8:00 PM   Dressing Status Clean;Dry;Intact 12/8/2018  8:00 PM   Number of days: 3       Prev airway: None documented    Drips:    heparin (porcine) in D5W 19 Units/kg/hr (12/10/18 0355)       Patient Active Problem List   Diagnosis    Peripheral arterial disease    Type 2 diabetes mellitus, without long-term current use of insulin    Essential hypertension    Amputated great toe of right foot    Chronic systolic heart failure    Atrial fibrillation with slow ventricular response    Osteomyelitis of right foot    Stage 3 chronic kidney disease    Open wound of right foot    Wound of right foot       Review of patient's allergies  "indicates:  No Known Allergies    Current Inpatient Medications:   ceFAZolin (ANCEF) IVPB  2 g Intravenous Q12H    furosemide  40 mg Oral BID    pravastatin  10 mg Oral Daily       No current facility-administered medications on file prior to encounter.      Current Outpatient Medications on File Prior to Encounter   Medication Sig Dispense Refill    aspirin (ECOTRIN) 81 MG EC tablet Take 81 mg by mouth.      dabigatran etexilate (PRADAXA) 150 mg Cap Take 150 mg by mouth once daily. "Do NOT break, chew, or open capsules."      furosemide (LASIX) 40 MG tablet Take 40 mg by mouth 2 (two) times daily.      glipiZIDE (GLUCOTROL) 10 MG tablet Take 10 mg by mouth every evening.       lisinopril 10 MG tablet Take 5 mg by mouth once daily.       metFORMIN (GLUCOPHAGE) 500 MG tablet Take 1,000 mg by mouth daily with breakfast.       metoprolol succinate (TOPROL-XL) 25 MG 24 hr tablet Take 25 mg by mouth once daily.      pravastatin (PRAVACHOL) 20 MG tablet Take 10 mg by mouth once daily.         Past Surgical History:   Procedure Laterality Date    APPENDECTOMY      BYPASS GRAFT         Social History     Socioeconomic History    Marital status:      Spouse name: Not on file    Number of children: Not on file    Years of education: Not on file    Highest education level: Not on file   Social Needs    Financial resource strain: Not on file    Food insecurity - worry: Not on file    Food insecurity - inability: Not on file    Transportation needs - medical: Not on file    Transportation needs - non-medical: Not on file   Occupational History    Not on file   Tobacco Use    Smoking status: Former Smoker     Packs/day: 1.00     Years: 25.00     Pack years: 25.00     Types: Cigarettes    Smokeless tobacco: Never Used   Substance and Sexual Activity    Alcohol use: No     Frequency: Never    Drug use: No    Sexual activity: Not Currently     Partners: Female   Other Topics Concern    Not on file "   Social History Narrative    Not on file       OBJECTIVE:     Vital Signs Range (Last 24H):  Temp:  [36.2 °C (97.1 °F)-36.7 °C (98.1 °F)]   Pulse:  [55-83]   Resp:  [16-20]   BP: (115-131)/(55-61)   SpO2:  [94 %-96 %]       Significant Labs:  Lab Results   Component Value Date    WBC 8.80 12/10/2018    HGB 10.9 (L) 12/10/2018    HCT 35.1 (L) 12/10/2018     12/10/2018    ALT 37 12/06/2018    AST 28 12/06/2018     12/10/2018    K 4.6 12/10/2018     12/10/2018    CREATININE 1.5 (H) 12/10/2018    BUN 25 (H) 12/10/2018    CO2 23 12/10/2018    INR 1.2 12/06/2018       Diagnostic Studies: No relevant studies.    EKG: No recent studies available.    2D ECHO:  No results found for this or any previous visit.    ASSESSMENT/PLAN:     Anesthesia Evaluation    I have reviewed the Patient Summary Reports.    I have reviewed the Nursing Notes.   I have reviewed the Medications.     Review of Systems  Anesthesia Hx:  No problems with previous Anesthesia  Denies Family Hx of Anesthesia complications.   Denies Personal Hx of Anesthesia complications.   Social:  Non-Smoker, No Alcohol Use    Hematology/Oncology:        Denies Current/Recent Cancer   EENT/Dental:   denies chronic allergic rhinitis   Cardiovascular:   Exercise tolerance: poor Hypertension Denies MI.  Denies CAD.    Denies CABG/stent. Dysrhythmias atrial fibrillation          Peripheral Arterial Disease    Pulmonary:   Denies COPD.  Denies Asthma.  Denies Recent URI.  Denies Sleep Apnea.    Renal/:   Chronic Renal Disease, CRI    Hepatic/GI:   Denies GERD. Denies Liver Disease.    Musculoskeletal:   Osteomyelitis   Neurological:   Denies TIA. Denies CVA. Denies Seizures.    Endocrine:   Diabetes    Psych:   Denies Psychiatric History.          Physical Exam  General:  Well nourished    Airway/Jaw/Neck:  Airway Findings: Mouth Opening: Normal Tongue: Normal  General Airway Assessment: Adult  Mallampati: III  Improves to II with phonation.  TM  Distance: Normal, at least 6 cm  Jaw/Neck Findings:  Neck ROM: Normal ROM      Dental:  Dental Findings: In tact    Chest/Lungs:  Chest/Lungs Findings: Clear to auscultation, Normal Respiratory Rate     Heart/Vascular:  Heart Findings: Rate: Normal  Rhythm: Irregularly Irregular     Abdomen:  Abdomen Findings:  Normal, Soft, Nontender     Musculoskeletal:  Musculoskeletal Findings: Normal   Skin:  Skin Findings: Normal    Mental Status:  Mental Status Findings:  Cooperative, Alert and Oriented         Anesthesia Plan  Type of Anesthesia, risks & benefits discussed:  Anesthesia Type:  general, MAC  Patient's Preference:   Intra-op Monitoring Plan: standard ASA monitors  Intra-op Monitoring Plan Comments:   Post Op Pain Control Plan: multimodal analgesia, IV/PO Opioids PRN and per primary service following discharge from PACU  Post Op Pain Control Plan Comments:   Induction:   IV  Beta Blocker:  Patient is not currently on a Beta-Blocker (No further documentation required).       Informed Consent: Patient understands risks and agrees with Anesthesia plan.  Questions answered. Anesthesia consent signed with patient.  ASA Score: 3     Day of Surgery Review of History & Physical:    H&P update referred to the surgeon.         Ready For Surgery From Anesthesia Perspective.

## 2018-12-10 NOTE — PLAN OF CARE
Problem: Occupational Therapy Goal  Goal: Occupational Therapy Goal  Eval complete, no OT needs at this time.  Safe to return home when medically clear.  Reorder OT if status deteriorates.  Outcome: Outcome(s) achieved Date Met: 12/10/18  Eval complete. Pt tolerated session well. D/C OT.

## 2018-12-10 NOTE — SUBJECTIVE & OBJECTIVE
Medications:  Continuous Infusions:   heparin (porcine) in D5W 19 Units/kg/hr (12/10/18 0355)     Scheduled Meds:   ceFAZolin (ANCEF) IVPB  2 g Intravenous Q8H    furosemide  40 mg Oral BID    pravastatin  10 mg Oral Daily     PRN Meds:acetaminophen, acetaminophen, acetaminophen, dextrose 50%, dextrose 50%, gabapentin, glucagon (human recombinant), glucose, glucose, heparin (PORCINE), heparin (PORCINE), heparin, porcine (PF), insulin aspart U-100, sodium chloride 0.9%     Objective:     Vital Signs (Most Recent):  Temp: 98.1 °F (36.7 °C) (12/10/18 0358)  Pulse: 61 (12/10/18 0358)  Resp: 20 (12/10/18 0358)  BP: 123/61 (12/10/18 0358)  SpO2: 96 % (12/10/18 0358) Vital Signs (24h Range):  Temp:  [97.1 °F (36.2 °C)-98.1 °F (36.7 °C)] 98.1 °F (36.7 °C)  Pulse:  [52-83] 61  Resp:  [16-20] 20  SpO2:  [93 %-96 %] 96 %  BP: (115-131)/(56-61) 123/61          Physical Exam   Constitutional: He is oriented to person, place, and time. He appears well-developed and well-nourished. No distress.   HENT:   Head: Normocephalic and atraumatic.   Eyes: No scleral icterus.   Neck: Neck supple.   Cardiovascular: Normal rate.   L- 2+ palpable femoral, popliteal, DP, PT  R- 2+ femoral, 1+ popliteal, non palpable DP, PT   Pulmonary/Chest: Effort normal. No respiratory distress.   Abdominal: Soft.   Musculoskeletal: He exhibits no edema.   Neurological: He is alert and oriented to person, place, and time.   Skin: Skin is warm.       Significant Labs:  CBC:   Recent Labs   Lab 12/10/18  0449   WBC 8.80   RBC 3.87*   HGB 10.9*   HCT 35.1*      MCV 91   MCH 28.2   MCHC 31.1*     CMP:   Recent Labs   Lab 12/10/18  0449   *   CALCIUM 9.7      K 4.6   CO2 23      BUN 25*   CREATININE 1.5*     Coagulation:   Recent Labs   Lab 12/10/18  0449   APTT 41.3*       Significant Diagnostics:  I have reviewed and interpreted all pertinent imaging results/findings within the past 24 hours.

## 2018-12-10 NOTE — ASSESSMENT & PLAN NOTE
· Irregular irregular rhythm on exam with HR controlled in lower 60's.   · Will hold home Metoprolol for now.   · Started on heparin infusion at [facility] with plans to transition with Dabigatran.   · INR 1.2 on admission.   · Will start heparin infusion here with low intensity and follow aPTT. APTT 41 today.

## 2018-12-10 NOTE — ASSESSMENT & PLAN NOTE
78 y M with h/o 4v bypass (2001), HFrEF (EF 45-50%), T2DM (A1c 8.1), HTN, SADNIE (on CPAP nightly), diverticulosis and newly diagnosed afib. Pt presented with osteomyelitis of R great toe s/p amputation. Wound was left open due to concern for lack of in-line flow to foot. His Cr is elevated at 1.6 so he was transferred here for possible CO2 angiogram. He is on a heparin gtt for newly diagnosed afib    - cardiac diet currently; NPO at midnight for CO2 angiogram  - anticoagulation per primary: heparin gtt   - Abx per primary: Ancef 2g q8h  - Cr stable at 1.5 (from 1.4); will plan on CO2 angiogram on 12/11

## 2018-12-10 NOTE — PLAN OF CARE
Problem: Patient Care Overview  Goal: Plan of Care Review  Outcome: Ongoing (interventions implemented as appropriate)  Plan of care reviewed with pt. Pt. Remains free of falls/trauma/injury. Pain control PRN. Pt. Educated on diabetes control, healthy eating, glucose checks, and insulin administration. Pt. Voices understanding. Pt tolerating plan of care well. Will continue to monitor.

## 2018-12-10 NOTE — PROGRESS NOTES
[facility]  Infectious Disease  Progress Note    Patient Name: Jai Godinez  MRN: [id number]  Admission Date: 12/6/2018  Length of Stay: 4 days  Attending Physician: Tess Garcia MD  Primary Care Provider: Pepe Valverde MD    Isolation Status: No active isolations  Assessment/Plan:      Osteomyelitis of right foot       78 year old man with DM, CHF, severe PAD who was transferred from the [facility] for vascular surgery evaluation s/p right great toe amputation on 12/3.  His post-operative course was complicated by loss of blood flow and wound was left open.  Because of kidney disease he was transferred here for CO2 angiography.      Cultures from [facility] showed MSSA and Group G strep.  Blood cultures were negative.  Per his wife, cultures were from bone and [facility]      Blood cultures NGTD.  New wound cultures yesterday NGTD.  DANILO studies here with occluded right ICA. CO2 angiogram scheduled for tomorrow.   Currently on IV cefazolin.    Plan/recommendations:  1.  Continue cefazolin 2 grams IV q 12 hours  2.  [facility] - need [facility] cultures sensitivities, pathology, Operative report, ID consult notes please.   3.  Anticipate 6 weeks of IV abx as with exposed bone.   4.  ID will follow with you tomorrow pending angiogram.            Thank you for your consult. I will follow-up with patient. Please contact us if you have any additional questions.    Jase Clark PA-C  Infectious Disease  [facility]  [phone]    Subjective:     Principal Problem:Amputated great toe of right foot    HPI:   Mr. Godinez is a 78 year old man with DM, CHF, severe PAD who was transferred from the [facility] for vascular surgery evaluation s/p right great toe amputation on 12/3.  Patient reports history of hospitalization in [place] from 11/14 to 11/21 for heart failure exacerbation.  He subsequently developed swelling of the right great toe associated with associated  "erythema. Per patient, he had had a small wound/callus on the toe for some time.   He cut the skin of his toe with his pocket knife, producing purulent discharge, [facility].  DANILO showed severe PAD and he underwent right toe amputation on 12/3.  His post-operative course was complicated by loss of blood flow and wound was left open.  Because of kidney disease he was transferred here for CO2 angiography.      There is also concern for osteomyelitis.  Wound cultures (? Bone cultures)  from [facility] showed MSSA and Group G strep.  Blood cultures were negative.  Per his wife, cultures were from bone and ID was consulted.  Initially treated with vancomycin and ertapenem per wife, then transitioned to IV ceftriaxone.    ID is consulted for stop date recommendations for antibiotics.     Denies associated fevers, chills, systemic symptoms.  Complaining of a "cramping" pain in his right foot.   Interval History:   NAEON  [facility]   CO2 angiogram tomorrow    Review of Systems   Constitutional: Negative for activity change, appetite change, chills, diaphoresis, fatigue and fever.   HENT: Negative.    Eyes: Negative.    Respiratory: Negative for cough, chest tightness and shortness of breath.    Cardiovascular: Positive for leg swelling. Negative for chest pain and palpitations.   Gastrointestinal: Negative for abdominal distention, abdominal pain, diarrhea, nausea and vomiting.   Genitourinary: Negative for difficulty urinating and dysuria.   Musculoskeletal: Negative for back pain, joint swelling, myalgias and neck pain.        Cramping pain right foot   Skin: Positive for wound. Negative for color change, pallor and rash.   Neurological: Negative for dizziness and headaches.   Hematological: Negative.    Psychiatric/Behavioral: Negative for agitation and confusion.     Objective:     Vital Signs (Most Recent):  Temp: 97.7 °F (36.5 °C) (12/10/18 0740)  Pulse: (!) 55 (12/10/18 0740)  Resp: 18 " (12/10/18 0740)  BP: (!) 115/55 (12/10/18 0740)  SpO2: 95 % (12/10/18 0740) Vital Signs (24h Range):  Temp:  [97.1 °F (36.2 °C)-98.1 °F (36.7 °C)] 97.7 °F (36.5 °C)  Pulse:  [52-83] 55  Resp:  [16-20] 18  SpO2:  [93 %-96 %] 95 %  BP: (115-131)/(55-61) 115/55     Weight: 104.3 kg (230 lb)  Body mass index is 33.97 kg/m².    Estimated Creatinine Clearance: 47.5 mL/min (A) (based on SCr of 1.5 mg/dL (H)).    Physical Exam   Constitutional: He is oriented to person, place, and time. He appears well-developed and well-nourished. No distress.   HENT:   Head: Normocephalic and atraumatic.   Poor dentition    Eyes: Conjunctivae are normal. No scleral icterus.   Neck: Normal range of motion.   Cardiovascular: Normal rate. An irregularly irregular rhythm present.   No murmur heard.  Pulmonary/Chest: Effort normal and breath sounds normal. No respiratory distress. He has no wheezes.   Abdominal: Soft. He exhibits no distension. There is no tenderness.   Musculoskeletal: He exhibits no edema.   Right foot wrapped.  Old drainage noted.   See Podiatry Photos.  Noted to have exposed bone  No ascending erythema   Neurological: He is alert and oriented to person, place, and time.   Skin: Skin is warm and dry. He is not diaphoretic.   Psychiatric: He has a normal mood and affect. His behavior is normal.   Vitals reviewed.          Significant Labs:   Blood Culture:   Recent Labs   Lab 12/06/18 1951   LABBLOO No Growth to date  No Growth to date  No Growth to date  No Growth to date  No Growth to date  No Growth to date  No Growth to date  No Growth to date     CBC:   Recent Labs   Lab 12/09/18  0430 12/10/18  0449   WBC 7.77 8.80   HGB 10.6* 10.9*   HCT 33.7* 35.1*    244     CMP:   Recent Labs   Lab 12/09/18  0430 12/10/18  0449    136   K 4.4 4.6    103   CO2 22* 23   * 176*   BUN 27* 25*   CREATININE 1.4 1.5*   CALCIUM 9.3 9.7   ANIONGAP 11 10   EGFRNONAA 47.4* 43.7*     Wound Culture:   Recent  Labs   Lab 12/07/18  1122   LABAERO No growth     All pertinent labs within the past 24 hours have been reviewed.    Significant Imaging: I have reviewed all pertinent imaging results/findings within the past 24 hours.

## 2018-12-10 NOTE — ASSESSMENT & PLAN NOTE
· Concern for possible right foot osteomyelitis with wound cultures growing MSSA and Group G Streptococcus; blood cultures negative; ID there treated with Ceftriaxone while in-patient, with plans to discharge on Doxycycline. [facility]   · Unclear if patient had imaging studies suggesting or confirming.   · Afebrile. No leukocytosis. ESR 75. CRP 93.    · Blood cultures ordered.   · ID consulted for recommendations regarding correct choice of antibiotic at [facility] and anticipated stop date of course. Status post one day of Ceftriaxone. Day #4 of Cefazolin per ID recs.   · Podiatry consulted; wound cultures and XR of foot ordered. Wound to be dressed with Betadyne and Kerflex for now. Recommending likely wound vac at discharge.

## 2018-12-10 NOTE — ASSESSMENT & PLAN NOTE
· Last EF 40-50% at [facility] per transfer note.   · No signs of fluid overload on exam.   · Managed with Lasix 40 mg BID, Lisinopril 40 mg, and Metoprolol daily.   · Holding Metoprolol temporarily with HR on admission in lower 60's and Lisinopril with possible STACY.  Lisinopril should be changed to ARB on discharge given history of chronic dry cough and worsening systemic rash after Lisinopril dose was increased some months ago.  · Will order cardiac and diabetic diet.

## 2018-12-10 NOTE — ASSESSMENT & PLAN NOTE
78 year old man with DM, CHF, severe PAD who was transferred from the [facility] for vascular surgery evaluation s/p right great toe amputation on 12/3.  His post-operative course was complicated by loss of blood flow and wound was left open.  Because of kidney disease he was transferred here for CO2 angiography.      Cultures from [facility] showed MSSA and Group G strep.  Blood cultures were negative.  Per his wife, cultures were from bone and [facility].      Blood cultures NGTD.  New wound cultures yesterday NGTD.  DANILO studies here with occluded right ICA. CO2 angiogram scheduled for tomorrow.   Currently on IV cefazolin.    Plan/recommendations:  1.  Continue cefazolin 2 grams IV q 12 hours  2.  [facility] - need bone cultures sensitivities, pathology, Operative report, ID consult notes please.   3.  Anticipate 6 weeks of IV abx as with exposed bone.   4.  ID will follow with you tomorrow pending angiogram.

## 2018-12-10 NOTE — PLAN OF CARE
Problem: Patient Care Overview  Goal: Plan of Care Review  Outcome: Ongoing (interventions implemented as appropriate)  SR up x2, call bell in reach, bed in low locked position, skid proof socks on. Patient AAOx4. VS stable WNL. APTT therapeutic, bag changed. No complains of pain. Family at bedside.  Patient remained free of fall and injuries during the shift.  Care plan explained to patient. No concerns, will continue to monitor.

## 2018-12-11 ENCOUNTER — ANESTHESIA (OUTPATIENT)
Dept: SURGERY | Facility: HOSPITAL | Age: 78
DRG: 300 | End: 2018-12-11
Payer: MEDICARE

## 2018-12-11 LAB
ANION GAP SERPL CALC-SCNC: 10 MMOL/L
APTT BLDCRRT: 39.8 SEC
BACTERIA BLD CULT: NORMAL
BACTERIA BLD CULT: NORMAL
BASOPHILS # BLD AUTO: 0.08 K/UL
BASOPHILS NFR BLD: 1 %
BUN SERPL-MCNC: 22 MG/DL
CALCIUM SERPL-MCNC: 9.7 MG/DL
CHLORIDE SERPL-SCNC: 102 MMOL/L
CO2 SERPL-SCNC: 24 MMOL/L
CREAT SERPL-MCNC: 1.3 MG/DL
DIFFERENTIAL METHOD: ABNORMAL
EOSINOPHIL # BLD AUTO: 0.4 K/UL
EOSINOPHIL NFR BLD: 5.1 %
ERYTHROCYTE [DISTWIDTH] IN BLOOD BY AUTOMATED COUNT: 13.6 %
EST. GFR  (AFRICAN AMERICAN): 60 ML/MIN/1.73 M^2
EST. GFR  (NON AFRICAN AMERICAN): 51.9 ML/MIN/1.73 M^2
GLUCOSE SERPL-MCNC: 188 MG/DL
HCT VFR BLD AUTO: 36.5 %
HGB BLD-MCNC: 11.3 G/DL
IMM GRANULOCYTES # BLD AUTO: 0.05 K/UL
IMM GRANULOCYTES NFR BLD AUTO: 0.6 %
LYMPHOCYTES # BLD AUTO: 1.6 K/UL
LYMPHOCYTES NFR BLD: 18.7 %
MCH RBC QN AUTO: 28.4 PG
MCHC RBC AUTO-ENTMCNC: 31 G/DL
MCV RBC AUTO: 92 FL
MONOCYTES # BLD AUTO: 0.9 K/UL
MONOCYTES NFR BLD: 10.6 %
NEUTROPHILS # BLD AUTO: 5.4 K/UL
NEUTROPHILS NFR BLD: 64 %
NRBC BLD-RTO: 0 /100 WBC
PLATELET # BLD AUTO: 225 K/UL
PMV BLD AUTO: 10.4 FL
POCT GLUCOSE: 148 MG/DL (ref 70–110)
POCT GLUCOSE: 157 MG/DL (ref 70–110)
POCT GLUCOSE: 199 MG/DL (ref 70–110)
POTASSIUM SERPL-SCNC: 4.7 MMOL/L
RBC # BLD AUTO: 3.98 M/UL
SODIUM SERPL-SCNC: 136 MMOL/L
WBC # BLD AUTO: 8.4 K/UL

## 2018-12-11 PROCEDURE — 36000707: Performed by: SURGERY

## 2018-12-11 PROCEDURE — 71000016 HC POSTOP RECOV ADDL HR: Performed by: SURGERY

## 2018-12-11 PROCEDURE — 71000044 HC DOSC ROUTINE RECOVERY FIRST HOUR: Performed by: SURGERY

## 2018-12-11 PROCEDURE — 71000015 HC POSTOP RECOV 1ST HR: Performed by: SURGERY

## 2018-12-11 PROCEDURE — 63600175 PHARM REV CODE 636 W HCPCS: Performed by: NURSE ANESTHETIST, CERTIFIED REGISTERED

## 2018-12-11 PROCEDURE — 82962 GLUCOSE BLOOD TEST: CPT | Performed by: SURGERY

## 2018-12-11 PROCEDURE — 85730 THROMBOPLASTIN TIME PARTIAL: CPT | Mod: 91

## 2018-12-11 PROCEDURE — 75710 ARTERY X-RAYS ARM/LEG: CPT | Mod: 26,,, | Performed by: SURGERY

## 2018-12-11 PROCEDURE — 37000009 HC ANESTHESIA EA ADD 15 MINS: Performed by: SURGERY

## 2018-12-11 PROCEDURE — C1887 CATHETER, GUIDING: HCPCS | Performed by: SURGERY

## 2018-12-11 PROCEDURE — D9220A PRA ANESTHESIA: Mod: CRNA,,, | Performed by: NURSE ANESTHETIST, CERTIFIED REGISTERED

## 2018-12-11 PROCEDURE — 36000706: Performed by: SURGERY

## 2018-12-11 PROCEDURE — 25000003 PHARM REV CODE 250: Performed by: STUDENT IN AN ORGANIZED HEALTH CARE EDUCATION/TRAINING PROGRAM

## 2018-12-11 PROCEDURE — C1769 GUIDE WIRE: HCPCS | Performed by: SURGERY

## 2018-12-11 PROCEDURE — 25000003 PHARM REV CODE 250: Performed by: SURGERY

## 2018-12-11 PROCEDURE — 85025 COMPLETE CBC W/AUTO DIFF WBC: CPT

## 2018-12-11 PROCEDURE — 63600175 PHARM REV CODE 636 W HCPCS: Performed by: STUDENT IN AN ORGANIZED HEALTH CARE EDUCATION/TRAINING PROGRAM

## 2018-12-11 PROCEDURE — 36246 INS CATH ABD/L-EXT ART 2ND: CPT | Mod: ,,, | Performed by: SURGERY

## 2018-12-11 PROCEDURE — 99233 SBSQ HOSP IP/OBS HIGH 50: CPT | Mod: 57,GC,, | Performed by: SURGERY

## 2018-12-11 PROCEDURE — 99900035 HC TECH TIME PER 15 MIN (STAT)

## 2018-12-11 PROCEDURE — 85730 THROMBOPLASTIN TIME PARTIAL: CPT

## 2018-12-11 PROCEDURE — 63600175 PHARM REV CODE 636 W HCPCS: Performed by: NURSE PRACTITIONER

## 2018-12-11 PROCEDURE — D9220A PRA ANESTHESIA: Mod: ANES,,, | Performed by: ANESTHESIOLOGY

## 2018-12-11 PROCEDURE — 37000008 HC ANESTHESIA 1ST 15 MINUTES: Performed by: SURGERY

## 2018-12-11 PROCEDURE — C1760 CLOSURE DEV, VASC: HCPCS | Performed by: SURGERY

## 2018-12-11 PROCEDURE — 25000003 PHARM REV CODE 250: Performed by: NURSE ANESTHETIST, CERTIFIED REGISTERED

## 2018-12-11 PROCEDURE — 99233 SBSQ HOSP IP/OBS HIGH 50: CPT | Mod: GC,,, | Performed by: STUDENT IN AN ORGANIZED HEALTH CARE EDUCATION/TRAINING PROGRAM

## 2018-12-11 PROCEDURE — C1894 INTRO/SHEATH, NON-LASER: HCPCS | Performed by: SURGERY

## 2018-12-11 PROCEDURE — 25500020 PHARM REV CODE 255: Performed by: SURGERY

## 2018-12-11 PROCEDURE — 63600175 PHARM REV CODE 636 W HCPCS: Performed by: SURGERY

## 2018-12-11 PROCEDURE — 11000001 HC ACUTE MED/SURG PRIVATE ROOM

## 2018-12-11 PROCEDURE — 36415 COLL VENOUS BLD VENIPUNCTURE: CPT

## 2018-12-11 PROCEDURE — B41FYZZ FLUOROSCOPY OF RIGHT LOWER EXTREMITY ARTERIES USING OTHER CONTRAST: ICD-10-PCS | Performed by: SURGERY

## 2018-12-11 PROCEDURE — 80048 BASIC METABOLIC PNL TOTAL CA: CPT

## 2018-12-11 RX ORDER — LIDOCAINE HYDROCHLORIDE 10 MG/ML
INJECTION, SOLUTION EPIDURAL; INFILTRATION; INTRACAUDAL; PERINEURAL
Status: DISCONTINUED | OUTPATIENT
Start: 2018-12-11 | End: 2018-12-11 | Stop reason: HOSPADM

## 2018-12-11 RX ORDER — HEPARIN SODIUM,PORCINE/D5W 25000/250
12 INTRAVENOUS SOLUTION INTRAVENOUS CONTINUOUS
Status: DISCONTINUED | OUTPATIENT
Start: 2018-12-11 | End: 2018-12-15

## 2018-12-11 RX ORDER — LIDOCAINE HCL/PF 100 MG/5ML
SYRINGE (ML) INTRAVENOUS
Status: DISCONTINUED | OUTPATIENT
Start: 2018-12-11 | End: 2018-12-11

## 2018-12-11 RX ORDER — HEPARIN SODIUM 1000 [USP'U]/ML
INJECTION, SOLUTION INTRAVENOUS; SUBCUTANEOUS
Status: DISCONTINUED | OUTPATIENT
Start: 2018-12-11 | End: 2018-12-11 | Stop reason: HOSPADM

## 2018-12-11 RX ORDER — IODIXANOL 320 MG/ML
INJECTION, SOLUTION INTRAVASCULAR
Status: DISCONTINUED | OUTPATIENT
Start: 2018-12-11 | End: 2018-12-11 | Stop reason: HOSPADM

## 2018-12-11 RX ORDER — SODIUM CHLORIDE 0.9 % (FLUSH) 0.9 %
3 SYRINGE (ML) INJECTION
Status: DISCONTINUED | OUTPATIENT
Start: 2018-12-11 | End: 2018-12-11 | Stop reason: HOSPADM

## 2018-12-11 RX ORDER — PROPOFOL 10 MG/ML
VIAL (ML) INTRAVENOUS CONTINUOUS PRN
Status: DISCONTINUED | OUTPATIENT
Start: 2018-12-11 | End: 2018-12-11

## 2018-12-11 RX ORDER — SODIUM CHLORIDE 9 MG/ML
INJECTION, SOLUTION INTRAVENOUS CONTINUOUS PRN
Status: DISCONTINUED | OUTPATIENT
Start: 2018-12-11 | End: 2018-12-11

## 2018-12-11 RX ADMIN — HEPARIN SODIUM AND DEXTROSE 12 UNITS/KG/HR: 10000; 5 INJECTION INTRAVENOUS at 05:12

## 2018-12-11 RX ADMIN — CEFAZOLIN 2 G: 1 INJECTION, POWDER, FOR SOLUTION INTRAMUSCULAR; INTRAVENOUS at 06:12

## 2018-12-11 RX ADMIN — FUROSEMIDE 40 MG: 40 TABLET ORAL at 09:12

## 2018-12-11 RX ADMIN — GABAPENTIN 100 MG: 100 CAPSULE ORAL at 08:12

## 2018-12-11 RX ADMIN — ACETAMINOPHEN 1000 MG: 500 TABLET, FILM COATED ORAL at 04:12

## 2018-12-11 RX ADMIN — CEFAZOLIN 2 G: 1 INJECTION, POWDER, FOR SOLUTION INTRAMUSCULAR; INTRAVENOUS at 01:12

## 2018-12-11 RX ADMIN — PROPOFOL 100 MCG/KG/MIN: 10 INJECTION, EMULSION INTRAVENOUS at 01:12

## 2018-12-11 RX ADMIN — LIDOCAINE HYDROCHLORIDE 50 MG: 20 INJECTION, SOLUTION INTRAVENOUS at 01:12

## 2018-12-11 RX ADMIN — PRAVASTATIN SODIUM 10 MG: 10 TABLET ORAL at 08:12

## 2018-12-11 RX ADMIN — FUROSEMIDE 40 MG: 40 TABLET ORAL at 08:12

## 2018-12-11 RX ADMIN — CEFAZOLIN 2 G: 1 INJECTION, POWDER, FOR SOLUTION INTRAMUSCULAR; INTRAVENOUS at 05:12

## 2018-12-11 RX ADMIN — SODIUM CHLORIDE: 0.9 INJECTION, SOLUTION INTRAVENOUS at 12:12

## 2018-12-11 NOTE — ASSESSMENT & PLAN NOTE
· Irregular irregular rhythm on exam with HR controlled in lower 60's.   · May restarted home Metoprolol at 12.5 mg with parameters to hold for HR <65.   · Started on heparin infusion at [facility] with plans to transition with Dabigatran.   · INR 1.2 on admission.   · Started heparin infusion here with low intensity and following aPTT. APTT 40 today.  · Will restart heparin infusion after angiogram today. [facility] records mention plan to discharge patient on Dabigatran based on approved coverage. Will continue heparin infusion until discharge recommendations complete from consults.

## 2018-12-11 NOTE — ASSESSMENT & PLAN NOTE
· Improved.   · Unclear of baseline; no outside records available. May prevent patient from receiving regular angiogram vs with CO2.   · Could be acute, but patient with history of poorly controlled T2DM.   · BUN 37, Cr 1.6, and GFR 40 on admission.    · Holding home Lisinopril in setting of possible STACY.   · Renal function plateau with GFR at 47 last few days, improved to 52 today.  · BMP daily.

## 2018-12-11 NOTE — SUBJECTIVE & OBJECTIVE
Interval History: Patient and wife seen at bedside this AM. They had no complaints. NPO at midnight for angiogram today. Urinating without difficulty. Pain continued to be improved with Gabapentin PRN.      Review of Systems   Constitutional: Negative for appetite change, chills, diaphoresis, fatigue and fever.   HENT: Negative for congestion, sore throat and trouble swallowing.    Eyes: Negative for photophobia, pain and discharge.   Respiratory: Negative for cough, chest tightness and shortness of breath.    Cardiovascular: Negative for chest pain, palpitations and leg swelling.   Gastrointestinal: Negative for abdominal pain, diarrhea, nausea and vomiting.   Genitourinary: Negative for decreased urine volume, difficulty urinating and urgency.   Musculoskeletal: Positive for gait problem. Negative for back pain, myalgias and neck pain.   Skin: Positive for wound. Negative for pallor and rash.   Neurological: Negative for dizziness, weakness, light-headedness, numbness and headaches.     Objective:     Vital Signs (Most Recent):  Temp: 98.4 °F (36.9 °C) (12/11/18 0750)  Pulse: 74 (12/11/18 0750)  Resp: 20 (12/11/18 0750)  BP: 108/60 (12/11/18 0750)  SpO2: 100 % (12/11/18 0750) Vital Signs (24h Range):  Temp:  [97.3 °F (36.3 °C)-98.4 °F (36.9 °C)] 98.4 °F (36.9 °C)  Pulse:  [57-87] 74  Resp:  [18-20] 20  SpO2:  [92 %-100 %] 100 %  BP: (108-119)/(55-60) 108/60     Weight: 104.3 kg (230 lb)  Body mass index is 33.97 kg/m².    Intake/Output Summary (Last 24 hours) at 12/11/2018 1013  Last data filed at 12/10/2018 2200  Gross per 24 hour   Intake 1188.4 ml   Output 1900 ml   Net -711.6 ml      Physical Exam   Constitutional: He is oriented to person, place, and time. He appears well-developed and well-nourished. No distress.   HENT:   Head: Normocephalic and atraumatic.   Mouth/Throat: Uvula is midline, oropharynx is clear and moist and mucous membranes are normal.   Eyes: Conjunctivae are normal. Pupils are equal,  round, and reactive to light.   Neck: No JVD present.   Cardiovascular: Normal rate and normal heart sounds. An irregularly irregular rhythm present.   No murmur heard.  Pulses:       Radial pulses are 2+ on the right side, and 2+ on the left side.        Dorsalis pedis pulses are 1+ on the right side, and 2+ on the left side.   Pulmonary/Chest: Effort normal and breath sounds normal. No respiratory distress.   Abdominal: Soft. Normal appearance and bowel sounds are normal. He exhibits no distension. There is no tenderness.   Musculoskeletal:   Extensive 3 cm open wound on right foot near site of 1st toe amputation with exposure of underlying bones. No purulent discharge. Dressing clean, dry, intact.    Neurological: He is alert and oriented to person, place, and time.   Skin: Skin is warm and dry. No bruising and no rash noted.       Significant Labs:   CBC:   Recent Labs   Lab 12/10/18  0449 12/11/18  0527   WBC 8.80 8.40   HGB 10.9* 11.3*   HCT 35.1* 36.5*    225     CMP:   Recent Labs   Lab 12/10/18  0449 12/11/18  0527    136   K 4.6 4.7    102   CO2 23 24   * 188*   BUN 25* 22   CREATININE 1.5* 1.3   CALCIUM 9.7 9.7   ANIONGAP 10 10   EGFRNONAA 43.7* 51.9*     All pertinent labs within the past 24 hours have been reviewed.    Significant Imaging: I have reviewed all pertinent imaging results/findings within the past 24 hours.  I have reviewed and interpreted all pertinent imaging results/findings within the past 24 hours.

## 2018-12-11 NOTE — ASSESSMENT & PLAN NOTE
· Last EF 40-50% at [facility] per transfer note.   · No signs of fluid overload on exam.   · Managed with Lasix 40 mg BID, Lisinopril 40 mg, and Metoprolol daily.   · Holding Metoprolol temporarily with HR on admission in lower 60's and Lisinopril with possible STACY.  Lisinopril should be changed to ARB on discharge given history of chronic dry cough and worsening systemic rash after Lisinopril dose was increased some months ago.  · Will order cardiac and diabetic diet.

## 2018-12-11 NOTE — SUBJECTIVE & OBJECTIVE
Medications:  Continuous Infusions:  Scheduled Meds:   ceFAZolin (ANCEF) IVPB  2 g Intravenous Q12H    furosemide  40 mg Oral BID    pravastatin  10 mg Oral Daily     PRN Meds:acetaminophen, acetaminophen, acetaminophen, dextrose 50%, dextrose 50%, gabapentin, glucagon (human recombinant), glucose, glucose, heparin, porcine (PF), insulin aspart U-100, sodium chloride 0.9%     Objective:     Vital Signs (Most Recent):  Temp: 98.3 °F (36.8 °C) (12/11/18 0424)  Pulse: 61 (12/11/18 0424)  Resp: 20 (12/11/18 0424)  BP: (!) 118/57 (12/11/18 0424)  SpO2: 97 % (12/11/18 0424) Vital Signs (24h Range):  Temp:  [97.3 °F (36.3 °C)-98.3 °F (36.8 °C)] 98.3 °F (36.8 °C)  Pulse:  [55-87] 61  Resp:  [18-20] 20  SpO2:  [92 %-97 %] 97 %  BP: (115-119)/(55-57) 118/57          Physical Exam   Constitutional: He is oriented to person, place, and time. He appears well-developed and well-nourished. No distress.   HENT:   Head: Normocephalic and atraumatic.   Eyes: No scleral icterus.   Neck: Neck supple.   Cardiovascular: Normal rate.   L- 2+ palpable femoral, popliteal, DP, PT  R- 2+ femoral, 1+ popliteal, non palpable DP, PT   Pulmonary/Chest: Effort normal. No respiratory distress.   Abdominal: Soft.   Musculoskeletal: He exhibits no edema.   Neurological: He is alert and oriented to person, place, and time.   Skin: Skin is warm.       Significant Labs:  CBC:   Recent Labs   Lab 12/11/18 0527   WBC 8.40   RBC 3.98*   HGB 11.3*   HCT 36.5*      MCV 92   MCH 28.4   MCHC 31.0*     CMP:   Recent Labs   Lab 12/11/18 0527   *   CALCIUM 9.7      K 4.7   CO2 24      BUN 22   CREATININE 1.3     Coagulation:   Recent Labs   Lab 12/11/18 0527   APTT 39.8*       Significant Diagnostics:  I have reviewed and interpreted all pertinent imaging results/findings within the past 24 hours.

## 2018-12-11 NOTE — DISCHARGE INSTRUCTIONS
Peripheral Angiography  Peripheral angiography is an outpatient procedure that makes a map of the vessels (arteries) in your lower body, legs, and arms, using X-ray and dye.This map can show where blood flow may be blocked.  Talk with your healthcare provider about the risks and complications of angiography.   Before the procedure  Prepare for the peripheral angiography as follows:   · Tell your healthcare provider about all medicines you take and any allergies you may have.  · Follow any directions youre given for not eating or drinking before the procedure. If your provider says to take your normal medicines, swallow them with only small sips of water.  · Arrange for a family member or friend to drive you home.  During the procedure  Here is what to expect:  ·   You may get medicine through an IV (intravenous) line to relax you. Youre given an injection to numb the insertion site. Then, a tiny skin cut (incision) is made near an artery in your groin.  · Your provider inserts a thin tube (catheter) through the incision. He or she then threads the catheter into an artery while looking at a video monitor.  · Contrast dye is injected into the catheter to confirm position. You may feel warmth or pressure in your legs and back. You lie still as X-rays are taken. The catheter is then taken out.  After the procedure  Youll be taken to a recovery area. A healthcare provider will apply pressure to the site for about 10 minutes. Your healthcare provider will tell you how long to lie down and keep the insertion site still. Your healthcare provider will discuss the results with you soon after the procedure.  Back at home  On the day you get home, dont drive, dont exercise, avoid walking and taking stairs, and avoid bending and lifting. Your healthcare provider may give you other care instructions.  Call your healthcare provider  Call your healthcare provider right away if:  · You notice a lump or bleeding at the  insertion site  · You feel pain at the insertion site  · You become lightheaded or dizzy  · You have leg pain or numbness  · You do not urinate in 8 hours    Date Last Reviewed: 5/1/2016  © 0470-5294 [employer]. [address] [place] [address], [place] [address]. All rights reserved. This information is not intended as a substitute for professional medical care. Always follow your healthcare professional's instructions.

## 2018-12-11 NOTE — BRIEF OP NOTE
Brief Operative Note  Date: 12/11/2018    Surgeon(s) and Role:     * Peng Orr MD - Primary    Pre-op Diagnosis:  Amputated great toe of right foot [Z89.411]; right foot ischemic ulcer    Post-op Diagnosis:  Same + R AT, PT artery occlusion    Procedure(s):  1) CO2 Aortogram (no previous CT available)  2) RLE CO2 Angiogram  3) US guided access L CFA       Surgeon: Peng Orr MD  Vascular & Endovascular Surgery     Anesthesia: Local MAC    Findings/Key Components:  Inline flow to the level of the R ankle via large peroneal, which arborizes and provides collateral perfusion to foot.     EBL: Minimal         Specimens (From admission, onward)    None          I attest to being present for the procedure and performing the case.  Peng Orr MD  Vascular & Endovascular Surgery

## 2018-12-11 NOTE — NURSING TRANSFER
Nursing Transfer Note      12/11/2018     Transfer To: Hospital, [facility], 6th Floor, [facility], Room 621A from [facility].    Transfer Via: Stretcher.    Transfer With: Nothing required.    Transported By: Patient escort.    Medicines Sent: None.    Chart Sent with Patient: Yes.    Notified: Spouse at bedside. KALYAN Hines RN.    Patient Reassessed at: 1630 on 12/11/2018.    Upon arrival to floor: Patient and patient's spouse oriented to room, bed in lowest position, and call bell within reach.

## 2018-12-11 NOTE — PLAN OF CARE
ID Follow Up:  Patient not seen today - out of room for angiogram most of day.   Chart reviewed. Remains afebrile, no leukocytosis, stable.   Will follow up angiogram results and follow up tomorrow with final recommendations.  Anticipate 6 weeks of IV antibiotics    Thank you.   Please call for any questions or concerns.  CONSUELO Valero, ANP-C  [phone]

## 2018-12-11 NOTE — TRANSFER OF CARE
"Anesthesia Transfer of Care Note    Patient: Jai Godinez    Procedure(s) Performed: Procedure(s) (LRB):  Angiogram Extremity Unilateral - L CFA access, RLE angio with CO2 contrast (N/A)    Patient location: [facility]    Anesthesia Type: general    Transport from OR: Transported from OR on 6-10 L/min O2 by face mask with adequate spontaneous ventilation    Post pain: adequate analgesia    Post assessment: tolerated procedure well and no apparent anesthetic complications    Post vital signs: stable    Level of consciousness: awake and responds to stimulation    Nausea/Vomiting: no nausea/vomiting    Complications: none    Transfer of care protocol was followed      Last vitals:   Visit Vitals  BP (!) 117/55 (BP Location: Left arm, Patient Position: Lying)   Pulse (!) 53   Temp 36.8 °C (98.2 °F) (Oral)   Resp 16   Ht 5' 9" (1.753 m)   Wt 104.3 kg (230 lb)   SpO2 97%   BMI 33.97 kg/m²     "

## 2018-12-11 NOTE — ANESTHESIA POSTPROCEDURE EVALUATION
"Anesthesia Post Evaluation    Patient: Jai Godinez    Procedure(s) Performed: Procedure(s) (LRB):  Angiogram Extremity Unilateral - L CFA access, RLE angio with CO2 contrast (N/A)    Final Anesthesia Type: general (Natural airway)  Patient location during evaluation: PACU  Patient participation: Yes- Able to Participate  Level of consciousness: awake and alert  Post-procedure vital signs: reviewed and stable  Pain management: adequate  Airway patency: patent  PONV status at discharge: No PONV  Anesthetic complications: no      Cardiovascular status: hemodynamically stable  Respiratory status: unassisted  Hydration status: euvolemic  Follow-up not needed.        Visit Vitals  /60 (BP Location: Left arm, Patient Position: Lying)   Pulse (!) 53   Temp 36.4 °C (97.5 °F) (Temporal)   Resp (!) 22   Ht 5' 9" (1.753 m)   Wt 104.3 kg (230 lb)   SpO2 95%   BMI 33.97 kg/m²       Pain/Karley Score: Pain Rating Prior to Med Admin: 6 (12/11/2018  4:00 PM)  Karley Score: 10 (12/11/2018  2:45 PM)        "

## 2018-12-11 NOTE — ASSESSMENT & PLAN NOTE
· Concern for possible right foot osteomyelitis with wound cultures growing MSSA and Group G Streptococcus; blood cultures negative; ID there treated with Ceftriaxone while in-patient, with plans to discharge on Doxycycline. [facility]   · Status post debridement by podiatry at [facility] prior to transfer.   · Unclear if patient had imaging studies suggesting or confirming.   · Afebrile. No leukocytosis. ESR 75. CRP 93.    · Blood cultures ordered.   · ID consulted for recommendations regarding correct choice of antibiotic at [facility] and anticipated stop date of course. Status post one day of Ceftriaxone. Day #5 of Cefazolin per ID recs; ID anticipates 6 weeks of IV ABX as with exposed bone.  · Podiatry consulted; wound cultures ordered. Wound to be dressed with Betadyne and Kerflex for now. Recommending likely wound vac at discharge.   · Podiatry waiting until angiogram to comment on further debridements.

## 2018-12-11 NOTE — ASSESSMENT & PLAN NOTE
78 y M with h/o 4v bypass (2001), HFrEF (EF 45-50%), T2DM (A1c 8.1), HTN, SANDIE (on CPAP nightly), diverticulosis and newly diagnosed afib. Pt presented with osteomyelitis of R great toe s/p amputation. Wound was left open due to concern for lack of in-line flow to foot. His Cr is elevated at 1.6 so he was transferred here for possible CO2 angiogram. He is on a heparin gtt for newly diagnosed afib    - NPO for CO2 angiogram today; may resume diet after procedure  - anticoagulation per primary: heparin gtt - held this AM for CO2 angiogram  - Abx per primary: Ancef 2g q8h  - Cr improved 1.5 -> 1.3

## 2018-12-11 NOTE — PLAN OF CARE
Patient meets all criteria for transfer back to [facility]SU on the 6th Floor, [facility], Room 621A. Repost called to KALYAN Hines RN.

## 2018-12-11 NOTE — ASSESSMENT & PLAN NOTE
· Status post amputation of first toe of right foot on 12/03 at [facility] secondary to either severe PAD with possible super-imposed osteomyelitis.   · [facility] was complicated by development non-pulsatile blood flow after, as a result podiatry has left wound open. Vascular at [facility] wanted to due angioplasty post-op, but CO2 angiography wasn't available, prompting transfer here. See vascular surgery and podiatry note for images of wound.   · Case discussed with Dr. Gomez in Vascular Surgery here at transfer. Vascular surgery evaluated; recommended repeating DANILO's and tentatively planning for angiogram. DANILO's ordered; only showing mild PAD. Angiogram by vascular this AM.   · Exam notable for diminished pulses in right foot and lower extremity. .   · Will resume heparin drip at low intensity for anticoagulation with new Afib.   · Wound care consulted; deferred care recommendations to podiatry.   · Tylenol PRN changed to Gabapentin 100 mg TID with improved control.   · PT/OT ordered.

## 2018-12-11 NOTE — PROGRESS NOTES
[facility]  Vascular Surgery  Progress Note    Patient Name: Jai Godinez  MRN: [id number]  Admission Date: 12/6/2018  Primary Care Provider: Pepe Valverde MD    Subjective:     Interval History: NAEON. Pt NPO at midnight for CO2 angiogram today. AF, VSS on RA. Creatinine improved 1.5 -> 1.3.     Post-Op Info:  Procedure(s) (LRB):  Angiogram Extremity Unilateral - L CFA access, RLE angio with CO2 contrast (N/A)           Medications:  Continuous Infusions:  Scheduled Meds:   ceFAZolin (ANCEF) IVPB  2 g Intravenous Q12H    furosemide  40 mg Oral BID    pravastatin  10 mg Oral Daily     PRN Meds:acetaminophen, acetaminophen, acetaminophen, dextrose 50%, dextrose 50%, gabapentin, glucagon (human recombinant), glucose, glucose, heparin, porcine (PF), insulin aspart U-100, sodium chloride 0.9%     Objective:     Vital Signs (Most Recent):  Temp: 98.3 °F (36.8 °C) (12/11/18 0424)  Pulse: 61 (12/11/18 0424)  Resp: 20 (12/11/18 0424)  BP: (!) 118/57 (12/11/18 0424)  SpO2: 97 % (12/11/18 0424) Vital Signs (24h Range):  Temp:  [97.3 °F (36.3 °C)-98.3 °F (36.8 °C)] 98.3 °F (36.8 °C)  Pulse:  [55-87] 61  Resp:  [18-20] 20  SpO2:  [92 %-97 %] 97 %  BP: (115-119)/(55-57) 118/57          Physical Exam   Constitutional: He is oriented to person, place, and time. He appears well-developed and well-nourished. No distress.   HENT:   Head: Normocephalic and atraumatic.   Eyes: No scleral icterus.   Neck: Neck supple.   Cardiovascular: Normal rate.   L- 2+ palpable femoral, popliteal, DP, PT  R- 2+ femoral, 1+ popliteal, non palpable DP, PT   Pulmonary/Chest: Effort normal. No respiratory distress.   Abdominal: Soft.   Musculoskeletal: He exhibits no edema.   Neurological: He is alert and oriented to person, place, and time.   Skin: Skin is warm.       Significant Labs:  CBC:   Recent Labs   Lab 12/11/18  0527   WBC 8.40   RBC 3.98*   HGB 11.3*   HCT 36.5*      MCV 92   MCH 28.4   MCHC 31.0*     CMP:    Recent Labs   Lab 12/11/18  0527   *   CALCIUM 9.7      K 4.7   CO2 24      BUN 22   CREATININE 1.3     Coagulation:   Recent Labs   Lab 12/11/18 0527   APTT 39.8*       Significant Diagnostics:  I have reviewed and interpreted all pertinent imaging results/findings within the past 24 hours.    Assessment/Plan:     * Amputated great toe of right foot    78 y M with h/o 4v bypass (2001), HFrEF (EF 45-50%), T2DM (A1c 8.1), HTN, SANDIE (on CPAP nightly), diverticulosis and newly diagnosed afib. Pt presented with osteomyelitis of R great toe s/p amputation. Wound was left open due to concern for lack of in-line flow to foot. His Cr is elevated at 1.6 so he was transferred here for possible CO2 angiogram. He is on a heparin gtt for newly diagnosed afib    - NPO for CO2 angiogram today; may resume diet after procedure  - anticoagulation per primary: heparin gtt - held this AM for CO2 angiogram  - Abx per primary: Ancef 2g q8h  - Cr improved 1.5 -> 1.3         Magdiel Roman MD  Vascular Surgery  [facility]

## 2018-12-11 NOTE — PLAN OF CARE
Patient arrived from OR with KYM Ayala RN and PURNIMA Hodges CRNA.  Patient stable.  Report received at this time.  Assumed care of patient at this time.

## 2018-12-11 NOTE — PROGRESS NOTES
[facility] Hospital Medicine  Progress Note    Patient Name: Jai Godinez  MRN: [id number]  Patient Class: IP- Inpatient   Admission Date: 12/6/2018  Length of Stay: 5 days  Attending Physician: Citlali Bazan MD  Primary Care Provider: Pepe Valverde MD    [facility] Team: [facility] MED 5 Miguel Angel Jones MD    Subjective:     Principal Problem:Amputated great toe of right foot    HPI:  This is a 78 year old female transferred from the [facility] for vascular surgery repair of right great toe status post amputation. He has a PMH of quadruple bypass (2001), severe peripheral artery disease, HFrEF (EF 45-50%), T2DM (A1c 8.1), HTN, SANDIE (on CPAP nightly) and diverticulosis.     He was hospitalized in [place] from 11/14 to 11/21 for acute on chronic heart failure exacerbation; discharge home with resolution of symptoms. He then developed one day of progressively worsening swelling of the right great toe associated with erythematous skin changes; for relief, he cut the skin of the toe with his pocket knife, at which point noticed purulent discharge, [facility]     Patient underwent right toe amputation done on 12/3 for pre-operative DANILO indicative of severe PAD. His post-operative course was complicated by development non-pulsatile blood flow after, as a result podiatry has left wound open. Vascular  at [facility] wanted to due angioplasty post-op, but CO2 angiography wasn't available. Also, concern for possible right foot osteomyelitis with wound cultures growing MSSA and Group G Streptococcus; blood cultures negative; ID there treated with Ceftriaxone while in-patient, with plans to discharge on Doxycycline.  In addition, on presentation, he was noted to have new development of Afib with slow ventricular response; rate control with Metoprolol and started on heparin drip while inpatient; transitioned to Dabigatran prior to transfer.     Patient/family requested transfer elsewhere  to accomplish procedure. [facility] today and case was discussed with Dr. Patricia Sweeney,  who requested patient to transfer to hospital medicine with vascular surgery consults.        On arrival here, he reports episodic cramping pain isolated to the amputation site occurring approximately Q5min. Pain relieved by taking Tylenol PRN. Denies fevers, chills, chest pain, SOB, palpitations, numbness of extremities.         Hospital Course:  12/07: No acute events overnight. Vascular surgery assessed this morning; recommended repeat DANILO and tentative angiogram today. Wound care and podiatry consulted for open wound and right foot osteomyelitis.  Wound care defering to podiatry. Podiatry repeating cultures and XR of foot. ID consulted for antibiotic recs; Ceftriaxone changed to Cefazolin (day #1). LR maintenance started to attempt optimization of kidney function; d/c home Lisinopril. [facility]   12/08: No events overnight. DANILO showing mild PAD. Scheduled for angiogram 12/12. BUN and Cr improved to 29 and 1.4 with maintenance LR; GFR 47. ESR 93, CRP 75. Day #2 of Cefazolin.   12/09: No events overnight. Day #3 of Cefazolin. Renal function same. Tylenol PRN changed to Gabapentin with pain improvement.   12/10: No events overnight. Day #4 of Cefazolin. Renal function same. Evaluated by Vascular; angiogram moved up to tomorrow. NPO at midnight.   12/11: No events overnight. Angiogram scheduled today. Day #5 of Cefazolin.     Interval History: Patient and wife seen at bedside this AM. They had no complaints. NPO at midnight for angiogram today. Urinating without difficulty. Pain continued to be improved with Gabapentin PRN.      Review of Systems   Constitutional: Negative for appetite change, chills, diaphoresis, fatigue and fever.   HENT: Negative for congestion, sore throat and trouble swallowing.    Eyes: Negative for photophobia, pain and discharge.   Respiratory: Negative  for cough, chest tightness and shortness of breath.    Cardiovascular: Negative for chest pain, palpitations and leg swelling.   Gastrointestinal: Negative for abdominal pain, diarrhea, nausea and vomiting.   Genitourinary: Negative for decreased urine volume, difficulty urinating and urgency.   Musculoskeletal: Positive for gait problem. Negative for back pain, myalgias and neck pain.   Skin: Positive for wound. Negative for pallor and rash.   Neurological: Negative for dizziness, weakness, light-headedness, numbness and headaches.     Objective:     Vital Signs (Most Recent):  Temp: 98.4 °F (36.9 °C) (12/11/18 0750)  Pulse: 74 (12/11/18 0750)  Resp: 20 (12/11/18 0750)  BP: 108/60 (12/11/18 0750)  SpO2: 100 % (12/11/18 0750) Vital Signs (24h Range):  Temp:  [97.3 °F (36.3 °C)-98.4 °F (36.9 °C)] 98.4 °F (36.9 °C)  Pulse:  [57-87] 74  Resp:  [18-20] 20  SpO2:  [92 %-100 %] 100 %  BP: (108-119)/(55-60) 108/60     Weight: 104.3 kg (230 lb)  Body mass index is 33.97 kg/m².    Intake/Output Summary (Last 24 hours) at 12/11/2018 1013  Last data filed at 12/10/2018 2200  Gross per 24 hour   Intake 1188.4 ml   Output 1900 ml   Net -711.6 ml      Physical Exam   Constitutional: He is oriented to person, place, and time. He appears well-developed and well-nourished. No distress.   HENT:   Head: Normocephalic and atraumatic.   Mouth/Throat: Uvula is midline, oropharynx is clear and moist and mucous membranes are normal.   Eyes: Conjunctivae are normal. Pupils are equal, round, and reactive to light.   Neck: No JVD present.   Cardiovascular: Normal rate and normal heart sounds. An irregularly irregular rhythm present.   No murmur heard.  Pulses:       Radial pulses are 2+ on the right side, and 2+ on the left side.        Dorsalis pedis pulses are 1+ on the right side, and 2+ on the left side.   Pulmonary/Chest: Effort normal and breath sounds normal. No respiratory distress.   Abdominal: Soft. Normal appearance and bowel  sounds are normal. He exhibits no distension. There is no tenderness.   Musculoskeletal:   Extensive 3 cm open wound on right foot near site of 1st toe amputation with exposure of underlying bones. No purulent discharge. Dressing clean, dry, intact.    Neurological: He is alert and oriented to person, place, and time.   Skin: Skin is warm and dry. No bruising and no rash noted.       Significant Labs:   CBC:   Recent Labs   Lab 12/10/18  0449 12/11/18  0527   WBC 8.80 8.40   HGB 10.9* 11.3*   HCT 35.1* 36.5*    225     CMP:   Recent Labs   Lab 12/10/18  0449 12/11/18  0527    136   K 4.6 4.7    102   CO2 23 24   * 188*   BUN 25* 22   CREATININE 1.5* 1.3   CALCIUM 9.7 9.7   ANIONGAP 10 10   EGFRNONAA 43.7* 51.9*     All pertinent labs within the past 24 hours have been reviewed.    Significant Imaging: I have reviewed all pertinent imaging results/findings within the past 24 hours.  I have reviewed and interpreted all pertinent imaging results/findings within the past 24 hours.    Assessment/Plan:      * Amputated great toe of right foot    · Status post amputation of first toe of right foot on 12/03 at [facility] secondary to either severe PAD with possible super-imposed osteomyelitis.   · [facility] was complicated by development non-pulsatile blood flow after, as a result podiatry has left wound open. Vascular at [facility] wanted to due angioplasty post-op, but CO2 angiography wasn't available, prompting transfer here. See vascular surgery and podiatry note for images of wound.   · Case discussed with Dr. Gomez in Vascular Surgery here at transfer. Vascular surgery evaluated; recommended repeating DANILO's and tentatively planning for angiogram. DANILO's ordered; only showing mild PAD. Angiogram by vascular this AM.   · Exam notable for diminished pulses in right foot and lower extremity. .   · Will resume heparin drip at low intensity for anticoagulation with new Afib.    · Wound care consulted; deferred care recommendations to podiatry.   · Tylenol PRN changed to Gabapentin 100 mg TID with improved control.   · PT/OT ordered.        Open wound of right foot    See assessment for right great toe amputation and right foot osteomyelitis.        Stage 3 chronic kidney disease    · Improved.   · Unclear of baseline; no outside records available. May prevent patient from receiving regular angiogram vs with CO2.   · Could be acute, but patient with history of poorly controlled T2DM.   · BUN 37, Cr 1.6, and GFR 40 on admission.    · Holding home Lisinopril in setting of possible STACY.   · Renal function plateau with GFR at 47 last few days, improved to 52 today.  · BMP daily.           Osteomyelitis of right foot    · Concern for possible right foot osteomyelitis with wound cultures growing MSSA and Group G Streptococcus; blood cultures negative; ID there treated with Ceftriaxone while in-patient, with plans to discharge on Doxycycline. [facility].   · Status post debridement by podiatry at [facility] prior to transfer.   · Unclear if patient had imaging studies suggesting or confirming.   · Afebrile. No leukocytosis. ESR 75. CRP 93.    · Blood cultures ordered.   · ID consulted for recommendations regarding correct choice of antibiotic at [facility] and anticipated stop date of course. Status post one day of Ceftriaxone. Day #5 of Cefazolin per ID recs; ID anticipates 6 weeks of IV ABX as with exposed bone.  · Podiatry consulted; wound cultures ordered. Wound to be dressed with Betadyne and Kerflex for now. Recommending likely wound vac at discharge.   · Podiatry waiting until angiogram to comment on further debridements.        Atrial fibrillation with slow ventricular response    · Irregular irregular rhythm on exam with HR controlled in lower 60's.   · May restarted home Metoprolol at 12.5 mg with parameters to hold for HR <65.   · Started on heparin infusion at [facility] with  plans to transition with Dabigatran.   · INR 1.2 on admission.   · Started heparin infusion here with low intensity and following aPTT. APTT 40 today.  · Will restart heparin infusion after angiogram today. [facility] records mention plan to discharge patient on Dabigatran based on approved coverage. Will continue heparin infusion until discharge recommendations complete from consults.         Chronic systolic heart failure    · Last EF 40-50% at [facility] per transfer note.   · No signs of fluid overload on exam.   · Managed with Lasix 40 mg BID, Lisinopril 40 mg, and Metoprolol daily.   · Holding Metoprolol temporarily with HR on admission in lower 60's and Lisinopril with possible STACY.  Lisinopril should be changed to ARB on discharge given history of chronic dry cough and worsening systemic rash after Lisinopril dose was increased some months ago.  · Will order cardiac and diabetic diet.         Essential hypertension    · Controlled.   · Resuming Lasix, and holding Metoprolol with HR in lower 60's.   · Discontinued home Lisinopril because of concern for possible acute on chronic STACY, but patient and wife reports history of dry cough and worsening systemic erythematous rash after Lisinopril dose was increased some months ago. Will enter as allergy in patient's chart.   · Will likely discharge on ARB instead of ACE-I.        Type 2 diabetes mellitus, without long-term current use of insulin    · Last A1c 8 at outside facility.   · Holding home Metformin and Glipizide  · Will order low dose sliding scale on admission.        Peripheral arterial disease    See assessment for amputation of great right toe.          VTE Risk Mitigation (From admission, onward)        Ordered     heparin, porcine (PF) 100 unit/mL injection flush 10 Units  As needed (PRN)      12/06/18 2119     IP VTE HIGH RISK PATIENT  Once      12/06/18 1859              Miguel Angel Jones MD  Department of Hospital Medicine   [facility]

## 2018-12-12 LAB
ANION GAP SERPL CALC-SCNC: 13 MMOL/L
APTT BLDCRRT: 27 SEC
APTT BLDCRRT: 32.3 SEC
APTT BLDCRRT: 37.5 SEC
APTT BLDCRRT: 44 SEC
BASOPHILS # BLD AUTO: 0.04 K/UL
BASOPHILS NFR BLD: 0.5 %
BUN SERPL-MCNC: 26 MG/DL
CALCIUM SERPL-MCNC: 9.9 MG/DL
CHLORIDE SERPL-SCNC: 100 MMOL/L
CO2 SERPL-SCNC: 22 MMOL/L
CREAT SERPL-MCNC: 1.7 MG/DL
DIFFERENTIAL METHOD: ABNORMAL
EOSINOPHIL # BLD AUTO: 0.4 K/UL
EOSINOPHIL NFR BLD: 5 %
ERYTHROCYTE [DISTWIDTH] IN BLOOD BY AUTOMATED COUNT: 13.9 %
EST. GFR  (AFRICAN AMERICAN): 43.4 ML/MIN/1.73 M^2
EST. GFR  (NON AFRICAN AMERICAN): 37.5 ML/MIN/1.73 M^2
GLUCOSE SERPL-MCNC: 196 MG/DL
HCT VFR BLD AUTO: 35.7 %
HGB BLD-MCNC: 11.2 G/DL
IMM GRANULOCYTES # BLD AUTO: 0.04 K/UL
IMM GRANULOCYTES NFR BLD AUTO: 0.5 %
LYMPHOCYTES # BLD AUTO: 1.3 K/UL
LYMPHOCYTES NFR BLD: 15.5 %
MCH RBC QN AUTO: 29.5 PG
MCHC RBC AUTO-ENTMCNC: 31.4 G/DL
MCV RBC AUTO: 94 FL
MONOCYTES # BLD AUTO: 0.9 K/UL
MONOCYTES NFR BLD: 10 %
NEUTROPHILS # BLD AUTO: 5.8 K/UL
NEUTROPHILS NFR BLD: 68.5 %
NRBC BLD-RTO: 0 /100 WBC
PLATELET # BLD AUTO: 224 K/UL
PMV BLD AUTO: 11.2 FL
POCT GLUCOSE: 180 MG/DL (ref 70–110)
POCT GLUCOSE: 191 MG/DL (ref 70–110)
POCT GLUCOSE: 213 MG/DL (ref 70–110)
POCT GLUCOSE: 227 MG/DL (ref 70–110)
POCT GLUCOSE: 240 MG/DL (ref 70–110)
POTASSIUM SERPL-SCNC: 4.7 MMOL/L
RBC # BLD AUTO: 3.8 M/UL
SODIUM SERPL-SCNC: 135 MMOL/L
WBC # BLD AUTO: 8.47 K/UL

## 2018-12-12 PROCEDURE — 99232 SBSQ HOSP IP/OBS MODERATE 35: CPT | Mod: GC,,, | Performed by: HOSPITALIST

## 2018-12-12 PROCEDURE — 36415 COLL VENOUS BLD VENIPUNCTURE: CPT

## 2018-12-12 PROCEDURE — 25000003 PHARM REV CODE 250: Performed by: STUDENT IN AN ORGANIZED HEALTH CARE EDUCATION/TRAINING PROGRAM

## 2018-12-12 PROCEDURE — 85025 COMPLETE CBC W/AUTO DIFF WBC: CPT

## 2018-12-12 PROCEDURE — 63600175 PHARM REV CODE 636 W HCPCS: Performed by: STUDENT IN AN ORGANIZED HEALTH CARE EDUCATION/TRAINING PROGRAM

## 2018-12-12 PROCEDURE — 80048 BASIC METABOLIC PNL TOTAL CA: CPT

## 2018-12-12 PROCEDURE — 11000001 HC ACUTE MED/SURG PRIVATE ROOM

## 2018-12-12 PROCEDURE — 63600175 PHARM REV CODE 636 W HCPCS: Performed by: NURSE PRACTITIONER

## 2018-12-12 PROCEDURE — 85730 THROMBOPLASTIN TIME PARTIAL: CPT | Mod: 91

## 2018-12-12 PROCEDURE — 85730 THROMBOPLASTIN TIME PARTIAL: CPT

## 2018-12-12 PROCEDURE — 99900035 HC TECH TIME PER 15 MIN (STAT)

## 2018-12-12 RX ORDER — CEFAZOLIN SODIUM 1 G/3ML
2 INJECTION, POWDER, FOR SOLUTION INTRAMUSCULAR; INTRAVENOUS EVERY 12 HOURS
Qty: 120000 MG | Refills: 1
Start: 2018-12-12 | End: 2019-02-10

## 2018-12-12 RX ORDER — PRAVASTATIN SODIUM 40 MG/1
40 TABLET ORAL DAILY
Status: DISCONTINUED | OUTPATIENT
Start: 2018-12-13 | End: 2018-12-18 | Stop reason: HOSPADM

## 2018-12-12 RX ORDER — GABAPENTIN 100 MG/1
100 CAPSULE ORAL 2 TIMES DAILY
Status: DISCONTINUED | OUTPATIENT
Start: 2018-12-12 | End: 2018-12-18 | Stop reason: HOSPADM

## 2018-12-12 RX ORDER — GABAPENTIN 100 MG/1
100 CAPSULE ORAL 2 TIMES DAILY
Qty: 60 CAPSULE | Refills: 11 | Status: SHIPPED | OUTPATIENT
Start: 2018-12-12 | End: 2019-12-12

## 2018-12-12 RX ADMIN — INSULIN ASPART 2 UNITS: 100 INJECTION, SOLUTION INTRAVENOUS; SUBCUTANEOUS at 05:12

## 2018-12-12 RX ADMIN — Medication: at 01:12

## 2018-12-12 RX ADMIN — CEFAZOLIN 2 G: 1 INJECTION, POWDER, FOR SOLUTION INTRAMUSCULAR; INTRAVENOUS at 06:12

## 2018-12-12 RX ADMIN — HEPARIN SODIUM AND DEXTROSE 19 UNITS/KG/HR: 10000; 5 INJECTION INTRAVENOUS at 04:12

## 2018-12-12 RX ADMIN — GABAPENTIN 100 MG: 100 CAPSULE ORAL at 06:12

## 2018-12-12 RX ADMIN — FUROSEMIDE 40 MG: 40 TABLET ORAL at 08:12

## 2018-12-12 RX ADMIN — HEPARIN SODIUM AND DEXTROSE 17 UNITS/KG/HR: 10000; 5 INJECTION INTRAVENOUS at 07:12

## 2018-12-12 RX ADMIN — FUROSEMIDE 40 MG: 40 TABLET ORAL at 10:12

## 2018-12-12 RX ADMIN — INSULIN ASPART 1 UNITS: 100 INJECTION, SOLUTION INTRAVENOUS; SUBCUTANEOUS at 08:12

## 2018-12-12 RX ADMIN — ACETAMINOPHEN 1000 MG: 500 TABLET, FILM COATED ORAL at 04:12

## 2018-12-12 RX ADMIN — GABAPENTIN 100 MG: 100 CAPSULE ORAL at 08:12

## 2018-12-12 RX ADMIN — HEPARIN SODIUM AND DEXTROSE 19 UNITS/KG/HR: 10000; 5 INJECTION INTRAVENOUS at 02:12

## 2018-12-12 RX ADMIN — PRAVASTATIN SODIUM 10 MG: 10 TABLET ORAL at 10:12

## 2018-12-12 RX ADMIN — Medication: at 10:12

## 2018-12-12 NOTE — ASSESSMENT & PLAN NOTE
· Status post amputation of first toe of right foot on 12/03 at [facility] secondary to either severe PAD with possible super-imposed osteomyelitis.   · [facility] course was complicated by development non-pulsatile blood flow after, as a result podiatry has left wound open. Vascular at [facility] wanted to due angioplasty post-op, but CO2 angiography wasn't available, prompting transfer here. See vascular surgery and podiatry note for images of wound.   · Case discussed with Dr. Gomez in Vascular Surgery here at transfer. Vascular surgery evaluated; recommended repeating DANILO's and tentatively planning for angiogram. DANILO's ordered; only showing mild PAD. Angiogram on 12/11 concerning for no significant blood flow with collaterals below the right ankle; recommended hyperbaric O2 as treatment modality, but not likely option given patient's insurance status. Podiatry will re-evaluate patient and discuss likely amputation with vascular.    · Exam notable for diminished pulses in right foot and lower extremity. .   · Continuing heparin drip at low intensity for anticoagulation with new Afib.   · Wound care consulted; deferred care recommendations to podiatry.   · Tylenol PRN changed to Gabapentin 100 mg TID with improved control.   · PT/OT ordered.

## 2018-12-12 NOTE — PROGRESS NOTES
[facility]  Infectious Disease  Progress Note    Patient Name: Jai Godinez  MRN: [id number]  Admission Date: 12/6/2018  Length of Stay: 6 days  Attending Physician: Citlali Bazan MD  Primary Care Provider: Pepe Valverde MD    Isolation Status: No active isolations  Assessment/Plan:      Osteomyelitis of right foot       78 year old man with DM, CHF, severe PAD who was transferred from the [facility] for vascular surgery evaluation s/p right great toe amputation on 12/3.  His post-operative course was complicated by loss of blood flow and wound was left open.  Because of kidney disease he was transferred here for CO2 angiography.  Cultures from [facility] showed MSSA and Group G strep.  Blood cultures were negative.  Per his wife, cultures were from bone and [facility].        Blood cultures here NGTD.  New wound cultures  NGTD.   Currently on IV cefazolin.  POD#1 CO2 angiogram which showed inline flow to level of right ankle via large peroneal, which arborizes and provides collateral perfusion to foot, though poor blood flow to right foot for which there is no recommended surgical intervention. Vascular Surgery recommends HBO therapy.         Per discussions with Primary Team, Podiatry has also seen patient and provided patient with option  (1) for further I&D, closure of wound and long term antibiotics; (2) TMA; (3) below ankle amputation.  Awaiting Podiatry note of today.   Patient would like to try option #1, debridement and long term IV abx.       Plan/recommendations:  1.  Continue cefazolin 2 grams IV q 12 hours for now.  2.  [facility]. Will review OP note and cultures tonight to determine if any adjustments in antibiotics necessary.   3.  Anticipate 6 weeks of IV abx given with exposed bone.   4.  Please send clean bone margins for cultures (aerobic, anaerobic, fungal, afb) and pathology with future debridement.   5.  Will follow up tomorrow  "with final recommendations.     Data reviewed and plan discussed with ID staff         Thank you.   Please call for any questions or concerns.  CONSUELO Valero, ANP-C  [phone]  Subjective:     Principal Problem:Amputated great toe of right foot    HPI:   Mr. Godinez is a 78 year old man with DM, CHF, severe PAD who was transferred from the [facility] for vascular surgery evaluation s/p right great toe amputation on 12/3.  Patient reports history of hospitalization in [place] from 11/14 to 11/21 for heart failure exacerbation.  He subsequently developed swelling of the right great toe associated with associated erythema. Per patient, he had had a small wound/callus on the toe for some time.   He cut the skin of his toe with his pocket knife, producing purulent discharge, [facility]  DANILO showed severe PAD and he underwent right toe amputation on 12/3.  His post-operative course was complicated by loss of blood flow and wound was left open.  Because of kidney disease he was transferred here for CO2 angiography.      There is also concern for osteomyelitis.  Wound cultures (? Bone cultures)  from [facility] showed MSSA and Group G strep.  Blood cultures were negative.  Per his wife, cultures were from bone and ID was consulted.  Initially treated with vancomycin and ertapenem per wife, then transitioned to IV ceftriaxone.    ID is consulted for stop date recommendations for antibiotics.     Denies associated fevers, chills, systemic symptoms.  Complaining of a "cramping" pain in his right foot.   Interval History:   POD#1 CO2 angiogram which showed "inline flow to level of right ankle via large peroneal, which arborizes and provides collateral perfusion to foot, though poor blood flow to right foot for which there is no recommended surgical intervention"   Afebrile. No complaints. Remains on IV cefazolin   ? Plan for further debridement/closure of the wound      Review of Systems   Constitutional: " Negative for activity change, appetite change, chills, diaphoresis, fatigue and fever.   HENT: Negative.    Eyes: Negative.    Respiratory: Negative for cough, chest tightness and shortness of breath.    Cardiovascular: Positive for leg swelling. Negative for chest pain and palpitations.   Gastrointestinal: Negative for abdominal distention, abdominal pain, diarrhea, nausea and vomiting.   Genitourinary: Negative for difficulty urinating and dysuria.   Musculoskeletal: Negative for back pain, joint swelling, myalgias and neck pain.        Cramping pain right foot   Skin: Positive for wound. Negative for color change, pallor and rash.   Neurological: Negative for dizziness and headaches.   Hematological: Negative.    Psychiatric/Behavioral: Negative for agitation and confusion.     Objective:     Vital Signs (Most Recent):  Temp: 96 °F (35.6 °C) (12/12/18 0813)  Pulse: (!) 59 (12/12/18 0813)  Resp: 19 (12/12/18 0813)  BP: 122/65 (12/12/18 0813)  SpO2: 98 % (12/12/18 0813) Vital Signs (24h Range):  Temp:  [96 °F (35.6 °C)-98.3 °F (36.8 °C)] 96 °F (35.6 °C)  Pulse:  [53-75] 59  Resp:  [14-22] 19  SpO2:  [94 %-100 %] 98 %  BP: (103-137)/(51-74) 122/65     Weight: 104.3 kg (230 lb)  Body mass index is 33.97 kg/m².    Estimated Creatinine Clearance: 41.9 mL/min (A) (based on SCr of 1.7 mg/dL (H)).    Physical Exam   Constitutional: He is oriented to person, place, and time. He appears well-developed and well-nourished. No distress.   HENT:   Head: Normocephalic and atraumatic.   Poor dentition    Eyes: Conjunctivae are normal. No scleral icterus.   Neck: Normal range of motion.   Cardiovascular: Normal rate. An irregularly irregular rhythm present.   No murmur heard.  Pulmonary/Chest: Effort normal and breath sounds normal. No respiratory distress. He has no wheezes.   Abdominal: Soft. He exhibits no distension. There is no tenderness.   Musculoskeletal: He exhibits no edema.   Right foot wrapped.  Old drainage noted.    See Podiatry Photos.  Noted to have exposed bone  No ascending erythema   Neurological: He is alert and oriented to person, place, and time.   Skin: Skin is warm and dry. He is not diaphoretic.   Psychiatric: He has a normal mood and affect. His behavior is normal.   Vitals reviewed.    12/12            Significant Labs:   Blood Culture:   Recent Labs   Lab 12/06/18 1951   LABBLOO No growth after 5 days.  No growth after 5 days.     CBC:   Recent Labs   Lab 12/11/18  0527 12/12/18  0500   WBC 8.40 8.47   HGB 11.3* 11.2*   HCT 36.5* 35.7*    224     CMP:   Recent Labs   Lab 12/11/18  0527 12/12/18  0500    135*   K 4.7 4.7    100   CO2 24 22*   * 196*   BUN 22 26*   CREATININE 1.3 1.7*   CALCIUM 9.7 9.9   ANIONGAP 10 13   EGFRNONAA 51.9* 37.5*     Wound Culture:   Recent Labs   Lab 12/07/18  1122   LABAERO No growth     All pertinent labs within the past 24 hours have been reviewed.    Significant Imaging: I have reviewed all pertinent imaging results/findings within the past 24 hours.

## 2018-12-12 NOTE — PROGRESS NOTES
[facility] Hospital Medicine  Progress Note    Patient Name: Jai Godinez  MRN: [id number]  Patient Class: IP- Inpatient   Admission Date: 12/6/2018  Length of Stay: 6 days  Attending Physician: Citlali Bazan MD  Primary Care Provider: Pepe Valverde MD    [facility] Medicine Team: [facility] HOSP MED 5 Miguel Angel Jones MD    Subjective:     Principal Problem:Amputated great toe of right foot    HPI:  This is a 78 year old female transferred from the [facility] for vascular surgery repair of right great toe status post amputation. He has a PMH of quadruple bypass (2001), severe peripheral artery disease, HFrEF (EF 45-50%), T2DM (A1c 8.1), HTN, SANDIE (on CPAP nightly) and diverticulosis.     He was hospitalized in [place] from 11/14 to 11/21 for acute on chronic heart failure exacerbation; discharge home with resolution of symptoms. He then developed one day of progressively worsening swelling of the right great toe associated with erythematous skin changes; for relief, he cut the skin of the toe with his pocket knife, at which point noticed purulent discharge, [facility]     Patient underwent right toe amputation done on 12/3 for pre-operative DANILO indicative of severe PAD. His post-operative course was complicated by development non-pulsatile blood flow after, as a result podiatry has left wound open. Vascular  at [facility] wanted to due angioplasty post-op, but CO2 angiography wasn't available. Also, concern for possible right foot osteomyelitis with wound cultures growing MSSA and Group G Streptococcus; blood cultures negative; ID there treated with Ceftriaxone while in-patient, with plans to discharge on Doxycycline.  In addition, on presentation, he was noted to have new development of Afib with slow ventricular response; rate control with Metoprolol and started on heparin drip while inpatient; transitioned to Dabigatran prior to transfer.     Patient/family requested transfer elsewhere  to accomplish procedure. [facility] contacted today and case was discussed with Dr. Patricia Sweeney,  who requested patient to transfer to hospital medicine with vascular surgery consults.        On arrival here, he reports episodic cramping pain isolated to the amputation site occurring approximately Q5min. Pain relieved by taking Tylenol PRN. Denies fevers, chills, chest pain, SOB, palpitations, numbness of extremities.         Hospital Course:  12/07: No acute events overnight. Vascular surgery assessed this morning; recommended repeat DANILO and tentative angiogram today. Wound care and podiatry consulted for open wound and right foot osteomyelitis.  Wound care defering to podiatry. Podiatry repeating cultures and XR of foot. ID consulted for antibiotic recs; Ceftriaxone changed to Cefazolin (day #1). LR maintenance started to attempt optimization of kidney function; d/c home Lisinopril. [facility].   12/08: No events overnight. DANILO showing mild PAD. Scheduled for angiogram 12/12. BUN and Cr improved to 29 and 1.4 with maintenance LR; GFR 47. ESR 93, CRP 75. Day #2 of Cefazolin.   12/09: No events overnight. Day #3 of Cefazolin. Renal function same. Tylenol PRN changed to Gabapentin with pain improvement.   12/10: No events overnight. Day #4 of Cefazolin. Renal function same. Evaluated by Vascular; angiogram moved up to tomorrow. NPO at midnight.   12/11: No events overnight. Angiogram scheduled today. Day #5 of Cefazolin.   12/12: Angiogram yesterday concerning for no significant blood flow with collaterals below the right ankle. Vascular recommended hyperbaric oxygen as possible treatment modality, but not likely option given patient's insurance status. Discussed case with podiatry; will speak to vascular and discuss blood flow to area for possible amputation. No events overnight. Day #6 of Cefazolin.     Interval History: Patient and wife seen at bedside this AM. They had no  complaints. Angiogram completed yesterday. Discussed results with both of them regarding next step in treatment. They agree that patient will likely not be able to obtain hyperbaric O2 therapy secondary to insurance status. Discussed the option of amputation. Patient would like to keep extremity and attempt management with wound vac and IV antibiotics if possible.       Review of Systems   Constitutional: Negative for appetite change, chills, diaphoresis, fatigue and fever.   HENT: Negative for congestion, sore throat and trouble swallowing.    Eyes: Negative for photophobia, pain and discharge.   Respiratory: Negative for cough, chest tightness and shortness of breath.    Cardiovascular: Negative for chest pain, palpitations and leg swelling.   Gastrointestinal: Negative for abdominal pain, diarrhea, nausea and vomiting.   Genitourinary: Negative for decreased urine volume, difficulty urinating and urgency.   Musculoskeletal: Positive for gait problem. Negative for back pain, myalgias and neck pain.   Skin: Positive for wound. Negative for pallor and rash.   Neurological: Negative for dizziness, weakness, light-headedness, numbness and headaches.     Objective:     Vital Signs (Most Recent):  Temp: 96 °F (35.6 °C) (12/12/18 0813)  Pulse: (!) 59 (12/12/18 0813)  Resp: 19 (12/12/18 0813)  BP: 122/65 (12/12/18 0813)  SpO2: 98 % (12/12/18 0813) Vital Signs (24h Range):  Temp:  [96 °F (35.6 °C)-98.3 °F (36.8 °C)] 96 °F (35.6 °C)  Pulse:  [53-75] 59  Resp:  [14-22] 19  SpO2:  [94 %-100 %] 98 %  BP: (103-137)/(51-74) 122/65     Weight: 104.3 kg (230 lb)  Body mass index is 33.97 kg/m².    Intake/Output Summary (Last 24 hours) at 12/12/2018 1205  Last data filed at 12/12/2018 0735  Gross per 24 hour   Intake 626.25 ml   Output --   Net 626.25 ml      Physical Exam   Constitutional: He is oriented to person, place, and time. He appears well-developed and well-nourished. No distress.   HENT:   Head: Normocephalic and  atraumatic.   Mouth/Throat: Uvula is midline, oropharynx is clear and moist and mucous membranes are normal.   Eyes: Conjunctivae are normal. Pupils are equal, round, and reactive to light.   Neck: No JVD present.   Cardiovascular: Normal rate and normal heart sounds. An irregularly irregular rhythm present.   No murmur heard.  Pulses:       Radial pulses are 2+ on the right side, and 2+ on the left side.        Dorsalis pedis pulses are 1+ on the right side, and 2+ on the left side.   Pulmonary/Chest: Effort normal and breath sounds normal. No respiratory distress.   Abdominal: Soft. Normal appearance and bowel sounds are normal. He exhibits no distension. There is no tenderness.   Musculoskeletal:   Extensive 3 cm open wound on right foot near site of 1st toe amputation with exposure of underlying bones. No purulent discharge. Dressing clean, dry, intact.    Neurological: He is alert and oriented to person, place, and time.   Skin: Skin is warm and dry. No bruising and no rash noted.       Significant Labs:   CBC:   Recent Labs   Lab 12/11/18  0527 12/12/18  0500   WBC 8.40 8.47   HGB 11.3* 11.2*   HCT 36.5* 35.7*    224     CMP:   Recent Labs   Lab 12/11/18  0527 12/12/18  0500    135*   K 4.7 4.7    100   CO2 24 22*   * 196*   BUN 22 26*   CREATININE 1.3 1.7*   CALCIUM 9.7 9.9   ANIONGAP 10 13   EGFRNONAA 51.9* 37.5*     All pertinent labs within the past 24 hours have been reviewed.    Significant Imaging: I have reviewed all pertinent imaging results/findings within the past 24 hours.  I have reviewed and interpreted all pertinent imaging results/findings within the past 24 hours.    Assessment/Plan:      * Amputated great toe of right foot    · Status post amputation of first toe of right foot on 12/03 at [facility] secondary to either severe PAD with possible super-imposed osteomyelitis.   · [facility] course was complicated by development non-pulsatile blood flow  after, as a result podiatry has left wound open. Vascular at [facility] wanted to due angioplasty post-op, but CO2 angiography wasn't available, prompting transfer here. See vascular surgery and podiatry note for images of wound.   · Case discussed with Dr. Gomez in Vascular Surgery here at transfer. Vascular surgery evaluated; recommended repeating DANILO's and tentatively planning for angiogram. DANILO's ordered; only showing mild PAD. Angiogram on 12/11 concerning for no significant blood flow with collaterals below the right ankle; recommended hyperbaric O2 as treatment modality, but not likely option given patient's insurance status. Podiatry will re-evaluate patient and discuss likely amputation with vascular.    · Exam notable for diminished pulses in right foot and lower extremity. .   · Continuing heparin drip at low intensity for anticoagulation with new Afib.   · Wound care consulted; deferred care recommendations to podiatry.   · Tylenol PRN changed to Gabapentin 100 mg TID with improved control.   · PT/OT ordered.        Open wound of right foot    See assessment for right great toe amputation and right foot osteomyelitis.        Stage 3 chronic kidney disease    · Improved.   · Unclear of baseline; no outside records available. May prevent patient from receiving regular angiogram vs with CO2.   · Could be acute, but patient with history of poorly controlled T2DM.   · BUN 37, Cr 1.6, and GFR 40 on admission.    · Holding home Lisinopril in setting of possible STACY.   · Renal function plateau with GFR at 47 last few days, diminished to 37 today likely secondary to angiogram.  · BMP daily.           Osteomyelitis of right foot    · Concern for possible right foot osteomyelitis with wound cultures growing MSSA and Group G Streptococcus; blood cultures negative; ID there treated with Ceftriaxone while in-patient, with plans to discharge on Doxycycline. [facility]   · Status post debridement by  podiatry at [facility] prior to transfer.   · Unclear if patient had imaging studies suggesting or confirming.   · Afebrile. No leukocytosis. ESR 75. CRP 93.    · Blood cultures ordered.   · ID consulted for recommendations regarding correct choice of antibiotic at [facility] and anticipated stop date of course. Status post one day of Ceftriaxone. Day #6 of Cefazolin per ID recs; ID anticipates 6 weeks of IV ABX as with exposed bone. Will hold off on PICC placement until decision regarding amputation versus patient's preference to keep extremity.   · Podiatry consulted; wound cultures ordered. Wound to be dressed with Betadyne and Kerflex for now. Recommending likely wound vac at discharge.   · Podiatry will re-evaluate patient today and discuss blood flow to area with vascular; per podiatry, patient will likely need amputation of at least foot, likely by vascular.        Atrial fibrillation with slow ventricular response    · Irregular irregular rhythm on exam with HR controlled in lower 60's.   · May restart home Metoprolol at 12.5 mg with parameters to hold for HR <65.   · Started on heparin infusion at [facility] with plans to transition with Dabigatran.   · INR 1.2 on admission.   · Started heparin infusion here with low intensity and following aPTT. APTT 38 today.  · [facility] records mention plan to discharge patient on Dabigatran based on approved coverage. Will continue heparin infusion until discharge recommendations complete from consults.    · Will need follow-up outpatient with cardiology at [facility].        Chronic systolic heart failure    · Last EF 40-50% at [facility] per transfer note.   · No signs of fluid overload on exam.   · Managed with Lasix 40 mg BID, Lisinopril 40 mg, and Metoprolol daily.   · Holding Metoprolol temporarily with HR on admission in lower 60's and Lisinopril with possible STACY.  Lisinopril should be changed to ARB on discharge given history of chronic dry cough and worsening systemic rash after Lisinopril  dose was increased some months ago.  · Will order cardiac and diabetic diet.         Essential hypertension    · Controlled.   · Resuming Lasix, and holding Metoprolol with HR in lower 60's.   · Discontinued home Lisinopril because of concern for possible acute on chronic SATCY, but patient and wife reports history of dry cough and worsening systemic erythematous rash after Lisinopril dose was increased some months ago. Will enter as allergy in patient's chart.   · Will likely discharge on ARB instead of ACE-I.        Type 2 diabetes mellitus, without long-term current use of insulin    · Last A1c 8 at outside facility.   · Holding home Metformin and Glipizide  · Low dose sliding scale ordered on admission.        Peripheral arterial disease    See assessment for amputation of great right toe.          VTE Risk Mitigation (From admission, onward)        Ordered     heparin 25,000 units in dextrose 5% 250 mL (100 units/mL) infusion LOW INTENSITY nomogram - [facility]  Continuous      12/11/18 1710     heparin 25,000 units in dextrose 5% (100 units/ml) IV bolus from bag - ADDITIONAL PRN BOLUS - 60 units/kg  As needed (PRN)      12/11/18 1710     heparin 25,000 units in dextrose 5% (100 units/ml) IV bolus from bag - ADDITIONAL PRN BOLUS - 30 units/kg  As needed (PRN)      12/11/18 1710     heparin, porcine (PF) 100 unit/mL injection flush 10 Units  As needed (PRN)      12/06/18 2119     IP VTE HIGH RISK PATIENT  Once      12/06/18 1850              Miguel Angel Jones MD  Department of Hospital Medicine   [facility]

## 2018-12-12 NOTE — ASSESSMENT & PLAN NOTE
78 year old man with DM, CHF, severe PAD who was transferred from the [facility] for vascular surgery evaluation s/p right great toe amputation on 12/3.  His post-operative course was complicated by loss of blood flow and wound was left open.  Because of kidney disease he was transferred here for CO2 angiography.  Cultures from [facility] showed MSSA and Group G strep.  Blood cultures were negative.  Per his wife, cultures were from bone and [facility].        Blood cultures here NGTD.  New wound cultures  NGTD.   Currently on IV cefazolin.  POD#1 CO2 angiogram which showed inline flow to level of right ankle via large peroneal, which arborizes and provides collateral perfusion to foot, though poor blood flow to right foot for which there is no recommended surgical intervention. Vascular Surgery recommends HBO therapy.         Per discussions with Primary Team, Podiatry has also seen patient and provided patient with option  (1) for further I&D, closure of wound and long term antibiotics; (2) TMA; (3) below ankle amputation.  Awaiting Podiatry note of today.   Patient would like to try option #1, debridement and long term IV abx.       Plan/recommendations:  1.  Continue cefazolin 2 grams IV q 12 hours for now.  2.  [facility]. Will review OP note and cultures tonight to determine if any adjustments in antibiotics necessary.   3.  Anticipate 6 weeks of IV abx given with exposed bone.   4.  Please send clean bone margins for cultures (aerobic, anaerobic, fungal, afb) and pathology with future debridement.   5.  Will follow up tomorrow with final recommendations.     Data reviewed and plan discussed with ID staff

## 2018-12-12 NOTE — ASSESSMENT & PLAN NOTE
· Last A1c 8 at outside facility.   · Holding home Metformin and Glipizide  · Low dose sliding scale ordered on admission.

## 2018-12-12 NOTE — SUBJECTIVE & OBJECTIVE
Interval History: Patient and wife seen at bedside this AM. They had no complaints. Angiogram completed yesterday. Discussed results with both of them regarding next step in treatment. They agree that patient will likely not be able to obtain hyperbaric O2 therapy secondary to insurance status. Discussed the option of amputation. Patient would like to keep extremity and attempt management with wound vac and IV antibiotics if possible.       Review of Systems   Constitutional: Negative for appetite change, chills, diaphoresis, fatigue and fever.   HENT: Negative for congestion, sore throat and trouble swallowing.    Eyes: Negative for photophobia, pain and discharge.   Respiratory: Negative for cough, chest tightness and shortness of breath.    Cardiovascular: Negative for chest pain, palpitations and leg swelling.   Gastrointestinal: Negative for abdominal pain, diarrhea, nausea and vomiting.   Genitourinary: Negative for decreased urine volume, difficulty urinating and urgency.   Musculoskeletal: Positive for gait problem. Negative for back pain, myalgias and neck pain.   Skin: Positive for wound. Negative for pallor and rash.   Neurological: Negative for dizziness, weakness, light-headedness, numbness and headaches.     Objective:     Vital Signs (Most Recent):  Temp: 96 °F (35.6 °C) (12/12/18 0813)  Pulse: (!) 59 (12/12/18 0813)  Resp: 19 (12/12/18 0813)  BP: 122/65 (12/12/18 0813)  SpO2: 98 % (12/12/18 0813) Vital Signs (24h Range):  Temp:  [96 °F (35.6 °C)-98.3 °F (36.8 °C)] 96 °F (35.6 °C)  Pulse:  [53-75] 59  Resp:  [14-22] 19  SpO2:  [94 %-100 %] 98 %  BP: (103-137)/(51-74) 122/65     Weight: 104.3 kg (230 lb)  Body mass index is 33.97 kg/m².    Intake/Output Summary (Last 24 hours) at 12/12/2018 1205  Last data filed at 12/12/2018 0735  Gross per 24 hour   Intake 626.25 ml   Output --   Net 626.25 ml      Physical Exam   Constitutional: He is oriented to person, place, and time. He appears well-developed  and well-nourished. No distress.   HENT:   Head: Normocephalic and atraumatic.   Mouth/Throat: Uvula is midline, oropharynx is clear and moist and mucous membranes are normal.   Eyes: Conjunctivae are normal. Pupils are equal, round, and reactive to light.   Neck: No JVD present.   Cardiovascular: Normal rate and normal heart sounds. An irregularly irregular rhythm present.   No murmur heard.  Pulses:       Radial pulses are 2+ on the right side, and 2+ on the left side.        Dorsalis pedis pulses are 1+ on the right side, and 2+ on the left side.   Pulmonary/Chest: Effort normal and breath sounds normal. No respiratory distress.   Abdominal: Soft. Normal appearance and bowel sounds are normal. He exhibits no distension. There is no tenderness.   Musculoskeletal:   Extensive 3 cm open wound on right foot near site of 1st toe amputation with exposure of underlying bones. No purulent discharge. Dressing clean, dry, intact.    Neurological: He is alert and oriented to person, place, and time.   Skin: Skin is warm and dry. No bruising and no rash noted.       Significant Labs:   CBC:   Recent Labs   Lab 12/11/18  0527 12/12/18  0500   WBC 8.40 8.47   HGB 11.3* 11.2*   HCT 36.5* 35.7*    224     CMP:   Recent Labs   Lab 12/11/18  0527 12/12/18  0500    135*   K 4.7 4.7    100   CO2 24 22*   * 196*   BUN 22 26*   CREATININE 1.3 1.7*   CALCIUM 9.7 9.9   ANIONGAP 10 13   EGFRNONAA 51.9* 37.5*     All pertinent labs within the past 24 hours have been reviewed.    Significant Imaging: I have reviewed all pertinent imaging results/findings within the past 24 hours.  I have reviewed and interpreted all pertinent imaging results/findings within the past 24 hours.

## 2018-12-12 NOTE — ASSESSMENT & PLAN NOTE
· Concern for possible right foot osteomyelitis with wound cultures growing MSSA and Group G Streptococcus; blood cultures negative; ID there treated with Ceftriaxone while in-patient, with plans to discharge on Doxycycline. [facility]   · Status post debridement by podiatry at [facility] prior to transfer.   · Unclear if patient had imaging studies suggesting or confirming.   · Afebrile. No leukocytosis. ESR 75. CRP 93.    · Blood cultures ordered.   · ID consulted for recommendations regarding correct choice of antibiotic at [facility] and anticipated stop date of course. Status post one day of Ceftriaxone. Day #6 of Cefazolin per ID recs; ID anticipates 6 weeks of IV ABX as with exposed bone. Will hold off on PICC placement until decision regarding amputation versus patient's preference to keep extremity.   · Podiatry consulted; wound cultures ordered. Wound to be dressed with Betadyne and Kerflex for now. Recommending likely wound vac at discharge.   · Podiatry will re-evaluate patient today and discuss blood flow to area with vascular; per podiatry, patient will likely need amputation of at least foot, likely by vascular.

## 2018-12-12 NOTE — PROGRESS NOTES
[facility]  Vascular Surgery  Progress Note    Patient Name: Jai Godinez  MRN: [id number]  Admission Date: 12/6/2018  Primary Care Provider: Pepe Valverde MD    Subjective:     Interval History: No acute events. CO2 angiogram yesterday showed inline flow to the level of the R ankle via large peroneal, which arborizes and provides collateral perfusion to foot.     Post-Op Info:  Procedure(s) (LRB):  Angiogram Extremity Unilateral - L CFA access, RLE angio with CO2 contrast (N/A)   1 Day Post-Op       Medications:  Continuous Infusions:   heparin (porcine) in D5W 17 Units/kg/hr (12/12/18 0735)     Scheduled Meds:   ceFAZolin (ANCEF) IVPB  2 g Intravenous Q12H    furosemide  40 mg Oral BID    pravastatin  10 mg Oral Daily     PRN Meds:acetaminophen, acetaminophen, acetaminophen, dextrose 50%, dextrose 50%, diphenhydrAMINE-zinc acetate 1-0.1%, gabapentin, glucagon (human recombinant), glucose, glucose, heparin (PORCINE), heparin (PORCINE), heparin, porcine (PF), insulin aspart U-100, sodium chloride 0.9%     Objective:     Vital Signs (Most Recent):  Temp: 96 °F (35.6 °C) (12/12/18 0813)  Pulse: (!) 59 (12/12/18 0813)  Resp: 19 (12/12/18 0813)  BP: 122/65 (12/12/18 0813)  SpO2: 98 % (12/12/18 0813) Vital Signs (24h Range):  Temp:  [96 °F (35.6 °C)-98.3 °F (36.8 °C)] 96 °F (35.6 °C)  Pulse:  [53-75] 59  Resp:  [14-22] 19  SpO2:  [94 %-100 %] 98 %  BP: (103-137)/(51-74) 122/65     Date 12/12/18 0700 - 12/13/18 0659   Shift 6465-4792 6874-7575 8310-4765 24 Hour Total   INTAKE   I.V.(mL/kg) 170(1.6)   170(1.6)   Shift Total(mL/kg) 170(1.6)   170(1.6)   OUTPUT   Shift Total(mL/kg)       Weight (kg) 104.3 104.3 104.3 104.3       Physical Exam   Constitutional: He is oriented to person, place, and time. He appears well-developed and well-nourished. No distress.   HENT:   Head: Normocephalic and atraumatic.   Eyes: No scleral icterus.   Neck: Neck supple.   Cardiovascular: Normal rate.   L- 2+  palpable femoral, popliteal, DP, PT  R- 2+ femoral, 1+ popliteal, non palpable DP, PT   Pulmonary/Chest: Effort normal. No respiratory distress.   Abdominal: Soft.   Musculoskeletal: He exhibits no edema.   Necrosis of right foot   Neurological: He is alert and oriented to person, place, and time.   Skin: Skin is warm.       Significant Labs:  CBC:   Recent Labs   Lab 12/12/18  0500   WBC 8.47   RBC 3.80*   HGB 11.2*   HCT 35.7*      MCV 94   MCH 29.5   MCHC 31.4*     CMP:   Recent Labs   Lab 12/11/18  0527   *   CALCIUM 9.7      K 4.7   CO2 24      BUN 22   CREATININE 1.3     Coagulation:   Recent Labs   Lab 12/12/18  0500   APTT 37.5*       Significant Diagnostics:  I have reviewed and interpreted all pertinent imaging results/findings within the past 24 hours.    Assessment/Plan:     * Amputated great toe of right foot    78 y M with h/o 4v bypass (2001), HFrEF (EF 45-50%), T2DM (A1c 8.1), HTN, SANDIE (on CPAP nightly), diverticulosis and newly diagnosed afib. Pt presented with osteomyelitis of R great toe s/p amputation. Wound was left open due to concern for lack of in-line flow to foot. His Cr is elevated at 1.6 so he was transferred here for possible CO2 angiogram. He is on a heparin gtt for newly diagnosed afib    - CO2 angiogram on 12/11/18 showed inline flow to the level of the R ankle via large peroneal, which arborizes and provides collateral perfusion to foot  - patient has poor blood flow to the right foot, for which there is no recommended surgical intervention; recommend local wound care, hyperbaric oxygen  - anticoagulation per primary: heparin gtt  - Abx per primary: Ancef 2g q8h  - latest Cr 1.3 (improved from 1.5)         Magdiel Roman MD  Vascular Surgery  [facility]

## 2018-12-12 NOTE — PLAN OF CARE
Problem: Patient Care Overview  Goal: Plan of Care Review  Outcome: Ongoing (interventions implemented as appropriate)  POC reviewed with patient. VSS. Accuchecks completed. Heparin drip infusing per orders, bleeding precautions in place. Next PTT due around 0000. Foot wound care completed per orders, CDI, patient tolerated well. NV intact, groin site CDI. Patient remains free from falls and trauma. Call light in reach. WCTM.

## 2018-12-12 NOTE — ASSESSMENT & PLAN NOTE
78 y M with h/o 4v bypass (2001), HFrEF (EF 45-50%), T2DM (A1c 8.1), HTN, SANDIE (on CPAP nightly), diverticulosis and newly diagnosed afib. Pt presented with osteomyelitis of R great toe s/p amputation. Wound was left open due to concern for lack of in-line flow to foot. His Cr is elevated at 1.6 so he was transferred here for possible CO2 angiogram. He is on a heparin gtt for newly diagnosed afib    - CO2 angiogram on 12/11/18 showed inline flow to the level of the R ankle via large peroneal, which arborizes and provides collateral perfusion to foot  - patient has poor blood flow to the right foot, for which there is no recommended surgical intervention; recommend local wound care, hyperbaric oxygen  - anticoagulation per primary: heparin gtt  - Abx per primary: Ancef 2g q8h  - latest Cr 1.3 (improved from 1.5)

## 2018-12-12 NOTE — ASSESSMENT & PLAN NOTE
· Last EF 40-50% at [facility] per transfer note.   · No signs of fluid overload on exam.   · Managed with Lasix 40 mg BID, Lisinopril 40 mg, and Metoprolol daily.   · Holding Metoprolol temporarily with HR on admission in lower 60's and Lisinopril with possible STACY.  Lisinopril should be changed to ARB on discharge given history of chronic dry cough and worsening systemic rash after Lisinopril dose was increased some months ago.  · Will order cardiac and diabetic diet.

## 2018-12-12 NOTE — SUBJECTIVE & OBJECTIVE
Scheduled Meds:   ceFAZolin (ANCEF) IVPB  2 g Intravenous Q12H    furosemide  40 mg Oral BID    gabapentin  100 mg Oral BID    [START ON 12/13/2018] pravastatin  40 mg Oral Daily     Continuous Infusions:   heparin (porcine) in D5W 19 Units/kg/hr (12/12/18 1620)     PRN Meds:acetaminophen, acetaminophen, acetaminophen, dextrose 50%, dextrose 50%, diphenhydrAMINE-zinc acetate 1-0.1%, glucagon (human recombinant), glucose, glucose, heparin (PORCINE), heparin (PORCINE), heparin, porcine (PF), insulin aspart U-100, sodium chloride 0.9%    Review of patient's allergies indicates:  No Known Allergies     Past Medical History:   Diagnosis Date    CHF (congestive heart failure)     Coronary artery disease     Diabetes mellitus     Diverticulosis     Hx of CABG     Hypertension      Past Surgical History:   Procedure Laterality Date    Angiogram Extremity Unilateral - L CFA access, RLE angio with CO2 contrast N/A 12/11/2018    Performed by Peng Orr MD at [facility] OR [address] [place] [address]    ANGIOGRAPHY OF LOWER EXTREMITY N/A 12/11/2018    Procedure: Angiogram Extremity Unilateral - L CFA access, RLE angio with CO2 contrast;  Surgeon: Peng Orr MD;  Location: [facility] OR [address] [place] [address];  Service: Peripheral Vascular;  Laterality: N/A;  local:7ml  fluro:2.9min  mGy:576.22  co2: 200cc  contrast:8ml          APPENDECTOMY      BYPASS GRAFT         Family History     Problem Relation (Age of Onset)    Arthritis Sister    Dementia Mother    No Known Problems Father        Tobacco Use    Smoking status: Former Smoker     Packs/day: 1.00     Years: 25.00     Pack years: 25.00     Types: Cigarettes    Smokeless tobacco: Never Used   Substance and Sexual Activity    Alcohol use: No     Frequency: Never    Drug use: No    Sexual activity: Not Currently     Partners: Female     Review of Systems   Constitutional: Negative for chills, diaphoresis, fatigue and fever.   Respiratory: Negative for chest tightness and shortness  of breath.    Cardiovascular: Positive for leg swelling. Negative for chest pain.   Gastrointestinal: Negative for nausea and vomiting.   Skin: Positive for color change and wound.   Neurological: Positive for numbness.     Objective:     Vital Signs (Most Recent):  Temp: 98.2 °F (36.8 °C) (12/12/18 1713)  Pulse: (!) 56 (12/12/18 1713)  Resp: 18 (12/12/18 1713)  BP: (!) 117/58 (12/12/18 1713)  SpO2: 96 % (12/12/18 1713) Vital Signs (24h Range):  Temp:  [96 °F (35.6 °C)-98.3 °F (36.8 °C)] 98.2 °F (36.8 °C)  Pulse:  [55-91] 56  Resp:  [18-20] 18  SpO2:  [94 %-98 %] 96 %  BP: (117-140)/(58-84) 117/58     Weight: 104.3 kg (230 lb)  Body mass index is 33.97 kg/m².    Foot Exam      Laboratory:  A1C: No results for input(s): HGBA1C in the last 4320 hours.  Blood Cultures:   No results for input(s): LABBLOO in the last 48 hours.  BMP:   Recent Labs   Lab 12/12/18  0500   *   *   K 4.7      CO2 22*   BUN 26*   CREATININE 1.7*   CALCIUM 9.9     CBC:   Recent Labs   Lab 12/12/18  0500   WBC 8.47   RBC 3.80*   HGB 11.2*   HCT 35.7*      MCV 94   MCH 29.5   MCHC 31.4*     CMP:   Recent Labs   Lab 12/06/18  1950  12/12/18  0500   *   < > 196*   CALCIUM 10.5   < > 9.9   ALBUMIN 2.9*  --   --    PROT 8.5*  --   --       < > 135*   K 4.8   < > 4.7   CO2 20*   < > 22*      < > 100   BUN 37*   < > 26*   CREATININE 1.6*   < > 1.7*   ALKPHOS 90  --   --    ALT 37  --   --    AST 28  --   --    BILITOT 0.6  --   --     < > = values in this interval not displayed.     Coagulation:   Recent Labs   Lab 12/06/18  1950  12/12/18  1325   INR 1.2  --   --    APTT  --    < > 32.3*    < > = values in this interval not displayed.     CRP: No results for input(s): CRP in the last 168 hours.  ESR:   Recent Labs   Lab 12/07/18  0459   SEDRATE 75*     Prealbumin: No results for input(s): PREALBUMIN in the last 48 hours.  Wound Cultures:   Recent Labs   Lab 12/07/18  1122   LABAERO No growth      Microbiology Results (last 7 days)     Procedure Component Value Units Date/Time    Blood culture (site 1) [[id number]] Collected:  12/06/18 1951    Order Status:  Completed Specimen:  Blood Updated:  12/11/18 2212     Blood Culture, Routine No growth after 5 days.    Narrative:       Site # 1, aerobic and anaerobic    Blood culture (site 2) [[id number]] Collected:  12/06/18 1951    Order Status:  Completed Specimen:  Blood Updated:  12/11/18 2212     Blood Culture, Routine No growth after 5 days.    Narrative:       Site # 2, aerobic only    Culture, Anaerobe [[id number]] Collected:  12/07/18 1122    Order Status:  Completed Specimen:  Wound from Foot, Right Updated:  12/11/18 0903     Anaerobic Culture Culture in progress    Aerobic culture [[id number]] Collected:  12/07/18 1122    Order Status:  Completed Specimen:  Wound from Foot, Right Updated:  12/10/18 0933     Aerobic Bacterial Culture No growth        Specimen (12h ago, onward)    None          Diagnostic Results:  X-Ray: I have reviewed all pertinent results/findings within the past 24 hours.  Ordered    Clinical Findings:    S/p R partial 1st ray amp with bone exposure. Granular base with ischemic appearing wound edges. No active bleeding noted. No purulence or acute SOI noted. No malodor. Stable.

## 2018-12-12 NOTE — ASSESSMENT & PLAN NOTE
· Irregular irregular rhythm on exam with HR controlled in lower 60's.   · May restart home Metoprolol at 12.5 mg with parameters to hold for HR <65.   · Started on heparin infusion at [facility] with plans to transition with Dabigatran.   · INR 1.2 on admission.   · Started heparin infusion here with low intensity and following aPTT. APTT 38 today.  · [facility] records mention plan to discharge patient on Dabigatran based on approved coverage. Will continue heparin infusion until discharge recommendations complete from consults.    · Will need follow-up outpatient with cardiology at [facility].

## 2018-12-12 NOTE — SUBJECTIVE & OBJECTIVE
"Interval History:   POD#1 CO2 angiogram which showed "inline flow to level of right ankle via large peroneal, which arborizes and provides collateral perfusion to foot, though poor blood flow to right foot for which there is no recommended surgical intervention"   Afebrile. No complaints. Remains on IV cefazolin   ? Plan for further debridement/closure of the wound      Review of Systems   Constitutional: Negative for activity change, appetite change, chills, diaphoresis, fatigue and fever.   HENT: Negative.    Eyes: Negative.    Respiratory: Negative for cough, chest tightness and shortness of breath.    Cardiovascular: Positive for leg swelling. Negative for chest pain and palpitations.   Gastrointestinal: Negative for abdominal distention, abdominal pain, diarrhea, nausea and vomiting.   Genitourinary: Negative for difficulty urinating and dysuria.   Musculoskeletal: Negative for back pain, joint swelling, myalgias and neck pain.        Cramping pain right foot   Skin: Positive for wound. Negative for color change, pallor and rash.   Neurological: Negative for dizziness and headaches.   Hematological: Negative.    Psychiatric/Behavioral: Negative for agitation and confusion.     Objective:     Vital Signs (Most Recent):  Temp: 96 °F (35.6 °C) (12/12/18 0813)  Pulse: (!) 59 (12/12/18 0813)  Resp: 19 (12/12/18 0813)  BP: 122/65 (12/12/18 0813)  SpO2: 98 % (12/12/18 0813) Vital Signs (24h Range):  Temp:  [96 °F (35.6 °C)-98.3 °F (36.8 °C)] 96 °F (35.6 °C)  Pulse:  [53-75] 59  Resp:  [14-22] 19  SpO2:  [94 %-100 %] 98 %  BP: (103-137)/(51-74) 122/65     Weight: 104.3 kg (230 lb)  Body mass index is 33.97 kg/m².    Estimated Creatinine Clearance: 41.9 mL/min (A) (based on SCr of 1.7 mg/dL (H)).    Physical Exam   Constitutional: He is oriented to person, place, and time. He appears well-developed and well-nourished. No distress.   HENT:   Head: Normocephalic and atraumatic.   Poor dentition    Eyes: Conjunctivae are " normal. No scleral icterus.   Neck: Normal range of motion.   Cardiovascular: Normal rate. An irregularly irregular rhythm present.   No murmur heard.  Pulmonary/Chest: Effort normal and breath sounds normal. No respiratory distress. He has no wheezes.   Abdominal: Soft. He exhibits no distension. There is no tenderness.   Musculoskeletal: He exhibits no edema.   Right foot wrapped.  Old drainage noted.   See Podiatry Photos.  Noted to have exposed bone  No ascending erythema   Neurological: He is alert and oriented to person, place, and time.   Skin: Skin is warm and dry. He is not diaphoretic.   Psychiatric: He has a normal mood and affect. His behavior is normal.   Vitals reviewed.    12/12            Significant Labs:   Blood Culture:   Recent Labs   Lab 12/06/18 1951   LABBLOO No growth after 5 days.  No growth after 5 days.     CBC:   Recent Labs   Lab 12/11/18  0527 12/12/18  0500   WBC 8.40 8.47   HGB 11.3* 11.2*   HCT 36.5* 35.7*    224     CMP:   Recent Labs   Lab 12/11/18  0527 12/12/18  0500    135*   K 4.7 4.7    100   CO2 24 22*   * 196*   BUN 22 26*   CREATININE 1.3 1.7*   CALCIUM 9.7 9.9   ANIONGAP 10 13   EGFRNONAA 51.9* 37.5*     Wound Culture:   Recent Labs   Lab 12/07/18  1122   LABAERO No growth     All pertinent labs within the past 24 hours have been reviewed.    Significant Imaging: I have reviewed all pertinent imaging results/findings within the past 24 hours.

## 2018-12-12 NOTE — SUBJECTIVE & OBJECTIVE
Medications:  Continuous Infusions:   heparin (porcine) in D5W 17 Units/kg/hr (12/12/18 0735)     Scheduled Meds:   ceFAZolin (ANCEF) IVPB  2 g Intravenous Q12H    furosemide  40 mg Oral BID    pravastatin  10 mg Oral Daily     PRN Meds:acetaminophen, acetaminophen, acetaminophen, dextrose 50%, dextrose 50%, diphenhydrAMINE-zinc acetate 1-0.1%, gabapentin, glucagon (human recombinant), glucose, glucose, heparin (PORCINE), heparin (PORCINE), heparin, porcine (PF), insulin aspart U-100, sodium chloride 0.9%     Objective:     Vital Signs (Most Recent):  Temp: 96 °F (35.6 °C) (12/12/18 0813)  Pulse: (!) 59 (12/12/18 0813)  Resp: 19 (12/12/18 0813)  BP: 122/65 (12/12/18 0813)  SpO2: 98 % (12/12/18 0813) Vital Signs (24h Range):  Temp:  [96 °F (35.6 °C)-98.3 °F (36.8 °C)] 96 °F (35.6 °C)  Pulse:  [53-75] 59  Resp:  [14-22] 19  SpO2:  [94 %-100 %] 98 %  BP: (103-137)/(51-74) 122/65     Date 12/12/18 0700 - 12/13/18 0659   Shift 1521-2688 4271-9780 0451-9708 24 Hour Total   INTAKE   I.V.(mL/kg) 170(1.6)   170(1.6)   Shift Total(mL/kg) 170(1.6)   170(1.6)   OUTPUT   Shift Total(mL/kg)       Weight (kg) 104.3 104.3 104.3 104.3       Physical Exam   Constitutional: He is oriented to person, place, and time. He appears well-developed and well-nourished. No distress.   HENT:   Head: Normocephalic and atraumatic.   Eyes: No scleral icterus.   Neck: Neck supple.   Cardiovascular: Normal rate.   L- 2+ palpable femoral, popliteal, DP, PT  R- 2+ femoral, 1+ popliteal, non palpable DP, PT   Pulmonary/Chest: Effort normal. No respiratory distress.   Abdominal: Soft.   Musculoskeletal: He exhibits no edema.   Necrosis of right foot   Neurological: He is alert and oriented to person, place, and time.   Skin: Skin is warm.       Significant Labs:  CBC:   Recent Labs   Lab 12/12/18  0500   WBC 8.47   RBC 3.80*   HGB 11.2*   HCT 35.7*      MCV 94   MCH 29.5   MCHC 31.4*     CMP:   Recent Labs   Lab 12/11/18  0527   *    CALCIUM 9.7      K 4.7   CO2 24      BUN 22   CREATININE 1.3     Coagulation:   Recent Labs   Lab 12/12/18  0500   APTT 37.5*       Significant Diagnostics:  I have reviewed and interpreted all pertinent imaging results/findings within the past 24 hours.

## 2018-12-12 NOTE — ASSESSMENT & PLAN NOTE
· Improved.   · Unclear of baseline; no outside records available. May prevent patient from receiving regular angiogram vs with CO2.   · Could be acute, but patient with history of poorly controlled T2DM.   · BUN 37, Cr 1.6, and GFR 40 on admission.    · Holding home Lisinopril in setting of possible STACY.   · Renal function plateau with GFR at 47 last few days, diminished to 37 today likely secondary to angiogram.  · BMP daily.

## 2018-12-13 LAB
ANION GAP SERPL CALC-SCNC: 11 MMOL/L
APTT BLDCRRT: 42.5 SEC
BASOPHILS # BLD AUTO: 0.07 K/UL
BASOPHILS NFR BLD: 0.9 %
BUN SERPL-MCNC: 23 MG/DL
CALCIUM SERPL-MCNC: 10 MG/DL
CHLORIDE SERPL-SCNC: 103 MMOL/L
CO2 SERPL-SCNC: 25 MMOL/L
CREAT SERPL-MCNC: 1.5 MG/DL
DIFFERENTIAL METHOD: ABNORMAL
EOSINOPHIL # BLD AUTO: 0.7 K/UL
EOSINOPHIL NFR BLD: 8.4 %
ERYTHROCYTE [DISTWIDTH] IN BLOOD BY AUTOMATED COUNT: 13.9 %
EST. GFR  (AFRICAN AMERICAN): 50.5 ML/MIN/1.73 M^2
EST. GFR  (NON AFRICAN AMERICAN): 43.7 ML/MIN/1.73 M^2
GLUCOSE SERPL-MCNC: 189 MG/DL
HCT VFR BLD AUTO: 34.7 %
HGB BLD-MCNC: 10.9 G/DL
IMM GRANULOCYTES # BLD AUTO: 0.05 K/UL
IMM GRANULOCYTES NFR BLD AUTO: 0.6 %
LYMPHOCYTES # BLD AUTO: 1.9 K/UL
LYMPHOCYTES NFR BLD: 22.9 %
MCH RBC QN AUTO: 29.4 PG
MCHC RBC AUTO-ENTMCNC: 31.4 G/DL
MCV RBC AUTO: 94 FL
MONOCYTES # BLD AUTO: 1 K/UL
MONOCYTES NFR BLD: 12 %
NEUTROPHILS # BLD AUTO: 4.5 K/UL
NEUTROPHILS NFR BLD: 55.2 %
NRBC BLD-RTO: 0 /100 WBC
PLATELET # BLD AUTO: 211 K/UL
PMV BLD AUTO: 10.5 FL
POCT GLUCOSE: 167 MG/DL (ref 70–110)
POCT GLUCOSE: 203 MG/DL (ref 70–110)
POCT GLUCOSE: 208 MG/DL (ref 70–110)
POCT GLUCOSE: 225 MG/DL (ref 70–110)
POTASSIUM SERPL-SCNC: 4.9 MMOL/L
RBC # BLD AUTO: 3.71 M/UL
SODIUM SERPL-SCNC: 139 MMOL/L
WBC # BLD AUTO: 8.07 K/UL

## 2018-12-13 PROCEDURE — 85730 THROMBOPLASTIN TIME PARTIAL: CPT

## 2018-12-13 PROCEDURE — 99232 SBSQ HOSP IP/OBS MODERATE 35: CPT | Mod: GC,,, | Performed by: HOSPITALIST

## 2018-12-13 PROCEDURE — 85025 COMPLETE CBC W/AUTO DIFF WBC: CPT

## 2018-12-13 PROCEDURE — 63600175 PHARM REV CODE 636 W HCPCS: Performed by: STUDENT IN AN ORGANIZED HEALTH CARE EDUCATION/TRAINING PROGRAM

## 2018-12-13 PROCEDURE — 63600175 PHARM REV CODE 636 W HCPCS: Performed by: HOSPITALIST

## 2018-12-13 PROCEDURE — 80048 BASIC METABOLIC PNL TOTAL CA: CPT

## 2018-12-13 PROCEDURE — 11000001 HC ACUTE MED/SURG PRIVATE ROOM

## 2018-12-13 PROCEDURE — 99233 SBSQ HOSP IP/OBS HIGH 50: CPT | Mod: ,,, | Performed by: PODIATRIST

## 2018-12-13 PROCEDURE — 36569 INSJ PICC 5 YR+ W/O IMAGING: CPT

## 2018-12-13 PROCEDURE — 99233 SBSQ HOSP IP/OBS HIGH 50: CPT | Mod: ,,, | Performed by: NURSE PRACTITIONER

## 2018-12-13 PROCEDURE — 63600175 PHARM REV CODE 636 W HCPCS: Performed by: NURSE PRACTITIONER

## 2018-12-13 PROCEDURE — 25000003 PHARM REV CODE 250: Performed by: NURSE PRACTITIONER

## 2018-12-13 PROCEDURE — 25000003 PHARM REV CODE 250: Performed by: STUDENT IN AN ORGANIZED HEALTH CARE EDUCATION/TRAINING PROGRAM

## 2018-12-13 PROCEDURE — 02HV33Z INSERTION OF INFUSION DEVICE INTO SUPERIOR VENA CAVA, PERCUTANEOUS APPROACH: ICD-10-PCS | Performed by: HOSPITALIST

## 2018-12-13 PROCEDURE — 76937 US GUIDE VASCULAR ACCESS: CPT

## 2018-12-13 PROCEDURE — C1751 CATH, INF, PER/CENT/MIDLINE: HCPCS

## 2018-12-13 RX ORDER — METRONIDAZOLE 500 MG/1
500 TABLET ORAL EVERY 8 HOURS
Status: DISCONTINUED | OUTPATIENT
Start: 2018-12-13 | End: 2018-12-18 | Stop reason: HOSPADM

## 2018-12-13 RX ORDER — INSULIN ASPART 100 [IU]/ML
2 INJECTION, SOLUTION INTRAVENOUS; SUBCUTANEOUS
Status: DISCONTINUED | OUTPATIENT
Start: 2018-12-13 | End: 2018-12-15

## 2018-12-13 RX ADMIN — ACETAMINOPHEN 1000 MG: 500 TABLET, FILM COATED ORAL at 06:12

## 2018-12-13 RX ADMIN — CEFAZOLIN 2 G: 1 INJECTION, POWDER, FOR SOLUTION INTRAMUSCULAR; INTRAVENOUS at 06:12

## 2018-12-13 RX ADMIN — METRONIDAZOLE 500 MG: 500 TABLET ORAL at 02:12

## 2018-12-13 RX ADMIN — GABAPENTIN 100 MG: 100 CAPSULE ORAL at 09:12

## 2018-12-13 RX ADMIN — INSULIN ASPART 2 UNITS: 100 INJECTION, SOLUTION INTRAVENOUS; SUBCUTANEOUS at 12:12

## 2018-12-13 RX ADMIN — GABAPENTIN 100 MG: 100 CAPSULE ORAL at 08:12

## 2018-12-13 RX ADMIN — INSULIN ASPART 2 UNITS: 100 INJECTION, SOLUTION INTRAVENOUS; SUBCUTANEOUS at 05:12

## 2018-12-13 RX ADMIN — PRAVASTATIN SODIUM 40 MG: 40 TABLET ORAL at 09:12

## 2018-12-13 RX ADMIN — FUROSEMIDE 40 MG: 40 TABLET ORAL at 09:12

## 2018-12-13 RX ADMIN — INSULIN ASPART 1 UNITS: 100 INJECTION, SOLUTION INTRAVENOUS; SUBCUTANEOUS at 09:12

## 2018-12-13 RX ADMIN — METRONIDAZOLE 500 MG: 500 TABLET ORAL at 11:12

## 2018-12-13 RX ADMIN — FUROSEMIDE 40 MG: 40 TABLET ORAL at 08:12

## 2018-12-13 RX ADMIN — ACETAMINOPHEN 650 MG: 325 TABLET ORAL at 12:12

## 2018-12-13 RX ADMIN — HEPARIN SODIUM AND DEXTROSE 19 UNITS/KG/HR: 10000; 5 INJECTION INTRAVENOUS at 09:12

## 2018-12-13 RX ADMIN — ACETAMINOPHEN 1000 MG: 500 TABLET, FILM COATED ORAL at 11:12

## 2018-12-13 NOTE — PROGRESS NOTES
[facility]  Podiatry  Progress Note    Patient Name: Jai Godinez  MRN: [id number]  Admission Date: 12/6/2018  Hospital Length of Stay: 6 days  Attending Physician: Citlali Bazan MD  Primary Care Provider: Pepe Valverde MD   Scheduled Meds:   ceFAZolin (ANCEF) IVPB  2 g Intravenous Q12H    furosemide  40 mg Oral BID    gabapentin  100 mg Oral BID    [START ON 12/13/2018] pravastatin  40 mg Oral Daily     Continuous Infusions:   heparin (porcine) in D5W 19 Units/kg/hr (12/12/18 1620)     PRN Meds:acetaminophen, acetaminophen, acetaminophen, dextrose 50%, dextrose 50%, diphenhydrAMINE-zinc acetate 1-0.1%, glucagon (human recombinant), glucose, glucose, heparin (PORCINE), heparin (PORCINE), heparin, porcine (PF), insulin aspart U-100, sodium chloride 0.9%    Review of patient's allergies indicates:  No Known Allergies     Past Medical History:   Diagnosis Date    CHF (congestive heart failure)     Coronary artery disease     Diabetes mellitus     Diverticulosis     Hx of CABG     Hypertension      Past Surgical History:   Procedure Laterality Date    Angiogram Extremity Unilateral - L CFA access, RLE angio with CO2 contrast N/A 12/11/2018    Performed by Peng Orr MD at [facility] OR [address] [place] [address]    ANGIOGRAPHY OF LOWER EXTREMITY N/A 12/11/2018    Procedure: Angiogram Extremity Unilateral - L CFA access, RLE angio with CO2 contrast;  Surgeon: Peng Orr MD;  Location: [facility] OR [address] [place] [address];  Service: Peripheral Vascular;  Laterality: N/A;  local:7ml  fluro:2.9min  mGy:576.22  co2: 200cc  contrast:8ml          APPENDECTOMY      BYPASS GRAFT         Family History     Problem Relation (Age of Onset)    Arthritis Sister    Dementia Mother    No Known Problems Father        Tobacco Use    Smoking status: Former Smoker     Packs/day: 1.00     Years: 25.00     Pack years: 25.00     Types: Cigarettes    Smokeless tobacco: Never Used   Substance and Sexual Activity    Alcohol  use: No     Frequency: Never    Drug use: No    Sexual activity: Not Currently     Partners: Female     Review of Systems   Constitutional: Negative for chills, diaphoresis, fatigue and fever.   Respiratory: Negative for chest tightness and shortness of breath.    Cardiovascular: Positive for leg swelling. Negative for chest pain.   Gastrointestinal: Negative for nausea and vomiting.   Skin: Positive for color change and wound.   Neurological: Positive for numbness.     Objective:     Vital Signs (Most Recent):  Temp: 98.2 °F (36.8 °C) (12/12/18 1713)  Pulse: (!) 56 (12/12/18 1713)  Resp: 18 (12/12/18 1713)  BP: (!) 117/58 (12/12/18 1713)  SpO2: 96 % (12/12/18 1713) Vital Signs (24h Range):  Temp:  [96 °F (35.6 °C)-98.3 °F (36.8 °C)] 98.2 °F (36.8 °C)  Pulse:  [55-91] 56  Resp:  [18-20] 18  SpO2:  [94 %-98 %] 96 %  BP: (117-140)/(58-84) 117/58     Weight: 104.3 kg (230 lb)  Body mass index is 33.97 kg/m².    Foot Exam      Laboratory:  A1C: No results for input(s): HGBA1C in the last 4320 hours.  Blood Cultures:   No results for input(s): LABBLOO in the last 48 hours.  BMP:   Recent Labs   Lab 12/12/18  0500   *   *   K 4.7      CO2 22*   BUN 26*   CREATININE 1.7*   CALCIUM 9.9     CBC:   Recent Labs   Lab 12/12/18  0500   WBC 8.47   RBC 3.80*   HGB 11.2*   HCT 35.7*      MCV 94   MCH 29.5   MCHC 31.4*     CMP:   Recent Labs   Lab 12/06/18  1950  12/12/18  0500   *   < > 196*   CALCIUM 10.5   < > 9.9   ALBUMIN 2.9*  --   --    PROT 8.5*  --   --       < > 135*   K 4.8   < > 4.7   CO2 20*   < > 22*      < > 100   BUN 37*   < > 26*   CREATININE 1.6*   < > 1.7*   ALKPHOS 90  --   --    ALT 37  --   --    AST 28  --   --    BILITOT 0.6  --   --     < > = values in this interval not displayed.     Coagulation:   Recent Labs   Lab 12/06/18  1950  12/12/18  1325   INR 1.2  --   --    APTT  --    < > 32.3*    < > = values in this interval not displayed.     CRP: No results for  input(s): CRP in the last 168 hours.  ESR:   Recent Labs   Lab 12/07/18  0459   SEDRATE 75*     Prealbumin: No results for input(s): PREALBUMIN in the last 48 hours.  Wound Cultures:   Recent Labs   Lab 12/07/18 1122   LABAERO No growth     Microbiology Results (last 7 days)     Procedure Component Value Units Date/Time    Blood culture (site 1) [[id number]] Collected:  12/06/18 1951    Order Status:  Completed Specimen:  Blood Updated:  12/11/18 2212     Blood Culture, Routine No growth after 5 days.    Narrative:       Site # 1, aerobic and anaerobic    Blood culture (site 2) [[id number]] Collected:  12/06/18 1951    Order Status:  Completed Specimen:  Blood Updated:  12/11/18 2212     Blood Culture, Routine No growth after 5 days.    Narrative:       Site # 2, aerobic only    Culture, Anaerobe [[id number]] Collected:  12/07/18 1122    Order Status:  Completed Specimen:  Wound from Foot, Right Updated:  12/11/18 0903     Anaerobic Culture Culture in progress    Aerobic culture [[id number]] Collected:  12/07/18 1122    Order Status:  Completed Specimen:  Wound from Foot, Right Updated:  12/10/18 0933     Aerobic Bacterial Culture No growth        Specimen (12h ago, onward)    None          Diagnostic Results:  X-Ray: I have reviewed all pertinent results/findings within the past 24 hours.  Ordered    Clinical Findings:    S/p R partial 1st ray amp with bone exposure. Granular base with ischemic appearing wound edges. No active bleeding noted. No purulence or acute SOI noted. No malodor. Stable.               Assessment/Plan:     * Amputated great toe of right foot    Pt with ischemic wound and exposed bone s/p right partial 1st ray amputation performed at [facility] on 12/3/18. No acute SOI noted stable    Plan:  - Pt was given treatment options: TMA vs further resection of the 1st met and primary closer vs wound vac vs proximal amputation  - It was explained to pt that d/t poor blood flow, he would likely need a proximal  amputation but he would like to proceed with a minimal surgery for now.  - Unfortunately, there are no surgeons available to perform the surgery this week. [facility]   - Will plan for home vac until patient can get OP closure of wound.  - Would continue IV antibiotics per ID.   - Podiatry will continue to follow         Stage 3 chronic kidney disease    Per primary     Osteomyelitis of right foot    Pt s/p amputation with exposed bone, no acute SOI noted, stable.   Will discuss possibility further debridement after angiogram performed  ID on board for management of OM, appreciate recs     Type 2 diabetes mellitus, without long-term current use of insulin    Per primary     Peripheral arterial disease    POD#1 - little flow to the foot after intervention.         Bravo Jane MD  Podiatry  [facility]

## 2018-12-13 NOTE — ASSESSMENT & PLAN NOTE
-Pt with ischemic wound and exposed bone s/p right partial 1st ray amputation performed at [facility] on 12/3/18. Wound left open with bone exposure. No acute SOI noted stable  -Vasc sx on board, appreciate recs  -Plan for local wound care for now with wound vac application, plan for further debridement in future with podiatry at [facility]. Recommend hyperbaric oxygen therapy.   -ABX per ID  -HH to change wound vac 2 x per week with adaptic to cover the bone followed by black sponge and set to 75 mmHg, low pressure.   -Plan to f/u with podiatry at [facility] for further debridement  -Ok to D/C from podiatry standpoint  -Podiatry will follow

## 2018-12-13 NOTE — ASSESSMENT & PLAN NOTE
Pt s/p amputation with exposed bone, no acute SOI noted, stable.   ID on board for management of OM, appreciate recs

## 2018-12-13 NOTE — PLAN OF CARE
Problem: Patient Care Overview  Goal: Plan of Care Review  Outcome: Ongoing (interventions implemented as appropriate)  Alert and oriented x4. VSS with HR 50s-60s. Heparin infusing at 19u/kg/hr, 16ml/min. x2 therapeutic range aPtt's resulted this shift, no changes to dose made. Patient turned and repositioned self independently without difficulty. No skin breakdown noted. Patient pain and safety monitored q 1-2 hrs this shift. Blood glucose monitored throughout shift. Dressing in place to right foot s/p great toe amputation 12/3 at [facility]. Ambulates with cane and x1 assistance. Bed locked and in lowest position. Bed side rails elevated x 2. Call light and personal belongings in reach. Spouse remains at bedside. Will cont. to monitor.   12/13/18 0635   Plan of Care Review   Plan of Care Reviewed With patient

## 2018-12-13 NOTE — PLAN OF CARE
HANG spoke with the [facility] the [facility] - Pebbles Osorio [phone] opt [phone], then ext [phone], who reported she is here to assist with the patient's d/c planning. HANG provided Pebbles with an update and communicate with her to make sure the patient d/c planning goes smoothly. HANG will continue to follow.     Millie Mayer LMSW   - [facility]  Ext.[phone]

## 2018-12-13 NOTE — PLAN OF CARE
Problem: Fall Risk (Adult)  Goal: Identify Related Risk Factors and Signs and Symptoms  Related risk factors and signs and symptoms are identified upon initiation of Human Response Clinical Practice Guideline (CPG)  Outcome: Ongoing (interventions implemented as appropriate)  Bedrest maintained as pt remained febrile entire shift.  Stated he felt weak and did not feel that he could ambulate even with staff assistance.  Remains free from falls, bed alarm utilized and call light remained within reach entire shift.  See flow sheet for vitals and pt remained tachypneic entire shift, pulse ox remained % on 2L NC.  Denies pain this shift.  Will continue to monitor

## 2018-12-13 NOTE — PT/OT/SLP PROGRESS
Physical Therapy      Patient Name:  Jai Godinez   MRN:  [id number]    Patient not seen today secondary to Unavailable (Comment)(Pt attempted x 3 attempts this day with pt with PICC team, radiology, and then wound care on each attempt. ). Will follow-up per POC.    Jake Guzman, PTA

## 2018-12-13 NOTE — ASSESSMENT & PLAN NOTE
· Status post amputation of first toe of right foot on 12/03 at [facility] secondary to either severe PAD with possible super-imposed osteomyelitis.   · [facility] that [facility] course course was complicated by development non-pulsatile blood flow after, as a result podiatry has left wound open. Vascular at [facility] wanted to due angioplasty post-op, but CO2 angiography wasn't available, prompting transfer here. See vascular surgery and podiatry note for images of wound.   · Case discussed with Dr. Gomez in Vascular Surgery here at transfer. Vascular surgery evaluated; recommended repeating DANILO's and tentatively planning for angiogram. DANILO's ordered; only showing mild PAD. Angiogram on 12/11 concerning for no significant blood flow with collaterals below the right ankle; recommended hyperbaric O2 as treatment modality, but not likely option given patient's insurance status. Podiatry re-evaluated; planning on outpatient debridement at [facility] with attempted treatment with IV antibiotics and wound vac, before possible amputation.    · Exam notable for diminished pulses in right foot and lower extremity. .   · Continuing heparin drip at low intensity for anticoagulation with new Afib.   · Wound care consulted; deferred care recommendations to podiatry.   · Tylenol PRN changed to Gabapentin 100 mg TID with improved control.   · PT/OT ordered.

## 2018-12-13 NOTE — ASSESSMENT & PLAN NOTE
78 year old man with DM, CHF, severe PAD who was transferred from the [facility] for vascular surgery evaluation s/p right great toe amputation on 12/3.  His post-operative course was complicated by loss of blood flow and wound was left open.  Because of kidney disease he was transferred here for CO2 angiography.  Cultures from [facility] showed MSSA and Group G strep.  Blood cultures were negative.  Per his wife, cultures were from bone and ID was consulted [facility] the [facility].        Blood cultures here NGTD.  New wound cultures  NGTD.   Currently on IV cefazolin.  POD#2 CO2 angiogram which showed inline flow to level of right ankle via large peroneal, which arborizes and provides collateral perfusion to foot, though poor blood flow to right foot for which there is no recommended surgical intervention. Vascular Surgery recommends HBO therapy.         Per discussions with Primary Team, Podiatry has also seen patient and provided patient with option  (1) for further I&D, closure of wound and long term antibiotics; (2) TMA; (3) below ankle amputation.  Awaiting Podiatry note of today.   Patient would like to try option #1, debridement and long term IV abx.        Reviewed outside records.  Op note indicates extensive bone necrosis into first metatarsal.  Does not appear clean margins taken.  Cannot find path report.  Cultures + for Group G strep, MSSA, and anaerobes.        Plan now is for local wound care with wound vac application (per most recent Podiatry note), with follow up at [facility] for futher debridement.       Afebrile. No complaints. Remains on IV cefazolin     Plan/discharge recommendations:  1.  Continue cefazolin 2 grams IV q 12 hours (renally dosed.  Discussed with ID PharmD).  2.  Add flagyl 500 mg orally q 8 hours for anaerobic coverage   3.  Anticipate 6 weeks of IV abx given with exposed bone.  Estimated end date 1/18/19  4.  Weekly cbc, cmp, crp, esr.  Until re-established with [facility] ID, fax results to ID at  [phone]  5.  Will need ID follow up at [facility]   6.  Patient given my card and encouraged to call or have follow up ID call with any questions.   7.  Will sign off.  Please call or re-consult as needed.     Data reviewed and plan discussed with ID staff  Discussed with Primary Team

## 2018-12-13 NOTE — OP NOTE
DATE OF PROCEDURE:  12/11/2018    SURGEON:  Peng Orr M.D.    PREOPERATIVE DIAGNOSIS:  Right foot ischemic ulcer with toe infection.    POSTOPERATIVE DIAGNOSES:  Right foot ischemic ulcer with toe infection plus   right anterior tibial and posterior tibial artery occlusion.    PROCEDURES:  1.  CO2 aortogram with no previous CT available.  2.  Right lower extremity CO2 angiogram, catheterization of second-order vessel   popliteal.  3. Ultrasound-guided access, left common femoral artery.    ANESTHESIA:  Local plus MAC.    ESTIMATED BLOOD LOSS:  Minimal.    SPECIMENS:  None.    PROCEDURE IN DETAIL:  The patient is a 78-year-old male who presented recently   with a right great toe infection requiring amputation.  He was noted to have   decreased pulses in the foot, and therefore an angiogram was planned.  Of note,   he had suffered acute kidney injury with decreased GFR and increased creatinine,   which after several days of appropriate therapy had resolved to near normal   levels; however, I felt CO2 angiogram for the bulk procedure was necessary.    The patient was identified as Jai Godinez and brought to the operating room.    He was positioned supine on the operating room table and prepped and draped in   standard sterile fashion to include a wide prep of both groins.  After a   team-wide timeout, during which the procedure and laterality were agreed upon by   all operating room staff, a subcutaneous wheal of 1% lidocaine was raised over   the left common femoral artery.  Ultrasound guidance was used to access the   anterior surface of the left common femoral artery with a micropuncture needle   and a micropuncture wire was advanced into the left common iliac artery.  The   micropuncture needle was removed and a micropuncture sheath was placed over the   wire.  The wire and inner cannula were removed and the Amplatz wire due to the   patient's substantial growth was used and advanced into the aorta under    fluoroscopic guidance.  Over the Amplatz wire, a 5-Frisian sheath was placed and   this was followed by an Omniflush catheter, which was advanced over the Amplatz   wire into the infrarenal aorta.  A CO2 aortogram was performed, which revealed a   patent distal aorta, patent common iliac, external iliac and internal iliac   arteries.    The Omniflush catheter was used with the floppy Glidewire to access the right   common femoral artery.  A glide catheter was advanced over the Glidewire to the   position of the distal right external iliac artery.  CO2 angiography was used to   examine the right common femoral, profunda and SFA, which were all widely   patent.  The catheter was advanced over a wire into the popliteal artery on the   right and a runoff angiogram with contrast was performed showing a patent   popliteal artery, patent TP trunk; however, proximal occlusion of the anterior   tibial and posterior tibial arteries.  Of note, there was a hypertrophied   peroneal artery that provided runoff to the ankle.  There was no reconstitution   of either tibial artery, instead the peroneal arborized into many smaller   vessels, which perfused the foot slowly.  There were no options to improve flow   to the foot either by endovascular or surgical means.    At this point, wires and catheters were removed.  The defect in the left common   femoral artery was closed with a 5-Frisian Mynx device with hemostasis evident.    Peng REEDER, was present for the entirety of the operation.      CHRIS  dd: 12/13/2018 11:28:40 (CST)  td: 12/13/2018 13:18:57 (CST)  Doc ID   #[id number]  Job ID #[id number]    CC:

## 2018-12-13 NOTE — CONSULTS
Double lumen PICC placed to (r) basilic vein.   41 cm in length, 0 cm exposed, and 34 cm arm circumference.  Lot # [id number]

## 2018-12-13 NOTE — SUBJECTIVE & OBJECTIVE
Interval Hx: Pt seen at bedside, no pedal complaints at this time.     Scheduled Meds:   ceFAZolin (ANCEF) IVPB  2 g Intravenous Q12H    furosemide  40 mg Oral BID    gabapentin  100 mg Oral BID    insulin aspart U-100  2 Units Subcutaneous TIDWM    pravastatin  40 mg Oral Daily     Continuous Infusions:   heparin (porcine) in D5W 19 Units/kg/hr (12/13/18 0907)     PRN Meds:acetaminophen, acetaminophen, acetaminophen, dextrose 50%, dextrose 50%, diphenhydrAMINE-zinc acetate 1-0.1%, glucagon (human recombinant), glucose, glucose, heparin (PORCINE), heparin (PORCINE), heparin, porcine (PF), insulin aspart U-100, sodium chloride 0.9%    Review of patient's allergies indicates:  No Known Allergies     Past Medical History:   Diagnosis Date    CHF (congestive heart failure)     Coronary artery disease     Diabetes mellitus     Diverticulosis     Hx of CABG     Hypertension      Past Surgical History:   Procedure Laterality Date    Angiogram Extremity Unilateral - L CFA access, RLE angio with CO2 contrast N/A 12/11/2018    Performed by Peng Orr MD at [facility] OR [address] [place] [address]    ANGIOGRAPHY OF LOWER EXTREMITY N/A 12/11/2018    Procedure: Angiogram Extremity Unilateral - L CFA access, RLE angio with CO2 contrast;  Surgeon: Peng Orr MD;  Location: [facility] OR [address] [place] [address];  Service: Peripheral Vascular;  Laterality: N/A;  local:7ml  fluro:2.9min  mGy:576.22  co2: 200cc  contrast:8ml          APPENDECTOMY      BYPASS GRAFT         Family History     Problem Relation (Age of Onset)    Arthritis Sister    Dementia Mother    No Known Problems Father        Tobacco Use    Smoking status: Former Smoker     Packs/day: 1.00     Years: 25.00     Pack years: 25.00     Types: Cigarettes    Smokeless tobacco: Never Used   Substance and Sexual Activity    Alcohol use: No     Frequency: Never    Drug use: No    Sexual activity: Not Currently     Partners: Female     Review of Systems   Constitutional: Negative  for chills, diaphoresis, fatigue and fever.   Respiratory: Negative for chest tightness and shortness of breath.    Cardiovascular: Positive for leg swelling. Negative for chest pain.   Gastrointestinal: Negative for nausea and vomiting.   Skin: Positive for color change and wound.   Neurological: Positive for numbness.     Objective:     Vital Signs (Most Recent):  Temp: 96.6 °F (35.9 °C) (12/13/18 0747)  Pulse: (!) 58 (12/13/18 0747)  Resp: 18 (12/13/18 0747)  BP: (!) 109/53 (12/13/18 0747)  SpO2: 98 % (12/13/18 0747) Vital Signs (24h Range):  Temp:  [96.6 °F (35.9 °C)-98.2 °F (36.8 °C)] 96.6 °F (35.9 °C)  Pulse:  [51-91] 58  Resp:  [18-20] 18  SpO2:  [95 %-98 %] 98 %  BP: (104-140)/(53-84) 109/53     Weight: 104.3 kg (230 lb)  Body mass index is 33.97 kg/m².    Foot Exam    General  General Appearance: appears stated age and healthy   Orientation: alert and oriented to person, place, and time   Affect: appropriate       Right Foot/Ankle     Inspection and Palpation  Ecchymosis: none  Skin Exam: skin changes, abnormal color, ulcer and erythema; no drainage and no cellulitis     Neurovascular  Dorsalis pedis: absent  Posterior tibial: absent  Saphenous nerve sensation: diminished  Tibial nerve sensation: diminished  Superficial peroneal nerve sensation: diminished  Deep peroneal nerve sensation: diminished  Sural nerve sensation: diminished            Laboratory:  A1C: No results for input(s): HGBA1C in the last 4320 hours.  Blood Cultures:   No results for input(s): LABBLOO in the last 48 hours.  BMP:   Recent Labs   Lab 12/13/18 0311   *      K 4.9      CO2 25   BUN 23   CREATININE 1.5*   CALCIUM 10.0     CBC:   Recent Labs   Lab 12/13/18 0311   WBC 8.07   RBC 3.71*   HGB 10.9*   HCT 34.7*      MCV 94   MCH 29.4   MCHC 31.4*     CMP:   Recent Labs   Lab 12/06/18  1950  12/13/18 0311   *   < > 189*   CALCIUM 10.5   < > 10.0   ALBUMIN 2.9*  --   --    PROT 8.5*  --   --        < > 139   K 4.8   < > 4.9   CO2 20*   < > 25      < > 103   BUN 37*   < > 23   CREATININE 1.6*   < > 1.5*   ALKPHOS 90  --   --    ALT 37  --   --    AST 28  --   --    BILITOT 0.6  --   --     < > = values in this interval not displayed.     Coagulation:   Recent Labs   Lab 12/06/18  1950  12/13/18  0311   INR 1.2  --   --    APTT  --    < > 42.5*    < > = values in this interval not displayed.     CRP: No results for input(s): CRP in the last 168 hours.  ESR:   Recent Labs   Lab 12/07/18  0459   SEDRATE 75*     Prealbumin: No results for input(s): PREALBUMIN in the last 48 hours.  Wound Cultures:   Recent Labs   Lab 12/07/18 1122   LABAERO No growth     Microbiology Results (last 7 days)     Procedure Component Value Units Date/Time    Blood culture (site 1) [[id number]] Collected:  12/06/18 1951    Order Status:  Completed Specimen:  Blood Updated:  12/11/18 2212     Blood Culture, Routine No growth after 5 days.    Narrative:       Site # 1, aerobic and anaerobic    Blood culture (site 2) [[id number]] Collected:  12/06/18 1951    Order Status:  Completed Specimen:  Blood Updated:  12/11/18 2212     Blood Culture, Routine No growth after 5 days.    Narrative:       Site # 2, aerobic only    Culture, Anaerobe [[id number]] Collected:  12/07/18 1122    Order Status:  Completed Specimen:  Wound from Foot, Right Updated:  12/11/18 0903     Anaerobic Culture Culture in progress    Aerobic culture [[id number]] Collected:  12/07/18 1122    Order Status:  Completed Specimen:  Wound from Foot, Right Updated:  12/10/18 0933     Aerobic Bacterial Culture No growth        Specimen (12h ago, onward)    None          Diagnostic Results:  X-Ray: I have reviewed all pertinent results/findings within the past 24 hours.  Ordered    Clinical Findings:    S/p R partial 1st ray amp with bone exposure. Granular base with ischemic appearing wound edges. No active bleeding noted. No purulence or acute SOI noted. No malodor. Stable.

## 2018-12-13 NOTE — PLAN OF CARE
Problem: Patient Care Overview  Goal: Plan of Care Review  Outcome: Ongoing (interventions implemented as appropriate)   12/12/18 2035   Plan of Care Review   Plan of Care Reviewed With patient       Problem: Diabetes, Type 2 (Adult)  Goal: Signs and Symptoms of Listed Potential Problems Will be Absent, Minimized or Managed (Diabetes, Type 2)  Signs and symptoms of listed potential problems will be absent, minimized or managed by discharge/transition of care (reference Diabetes, Type 2 (Adult) CPG).  Outcome: Ongoing (interventions implemented as appropriate)  Pt is diabetic , blood glucose monitored and remained WDL this shift

## 2018-12-13 NOTE — SUBJECTIVE & OBJECTIVE
"Interval History:   POD#2 CO2 angiogram which showed "inline flow to level of right ankle via large peroneal, which arborizes and provides collateral perfusion to foot, though poor blood flow to right foot for which there is no recommended surgical intervention"  Vascular recommends HBO.     Reviewed outside records.  Does not appear clean margins taken.  Cultures + for Group G strep, MSSA, and anaerobes.       Plan now is for local wound care with wound vac application (per most recent Podiatry note), with follow up at [facility] for futher debridement.      Afebrile. No complaints. Remains on IV cefazolin     Review of Systems   Constitutional: Negative for activity change, appetite change, chills, diaphoresis, fatigue and fever.   HENT: Negative.    Eyes: Negative.    Respiratory: Negative for cough, chest tightness and shortness of breath.    Cardiovascular: Positive for leg swelling. Negative for chest pain and palpitations.   Gastrointestinal: Negative for abdominal distention, abdominal pain, diarrhea, nausea and vomiting.   Genitourinary: Negative for difficulty urinating and dysuria.   Musculoskeletal: Negative for back pain, joint swelling, myalgias and neck pain.        Cramping pain right foot   Skin: Positive for wound. Negative for color change, pallor and rash.   Neurological: Negative for dizziness and headaches.   Hematological: Negative.    Psychiatric/Behavioral: Negative for agitation and confusion.     Objective:     Vital Signs (Most Recent):  Temp: 98.3 °F (36.8 °C) (12/13/18 1227)  Pulse: 71 (12/13/18 1227)  Resp: 18 (12/13/18 0747)  BP: (!) 115/59 (12/13/18 1227)  SpO2: 97 % (12/13/18 1227) Vital Signs (24h Range):  Temp:  [96.6 °F (35.9 °C)-98.3 °F (36.8 °C)] 98.3 °F (36.8 °C)  Pulse:  [51-71] 71  Resp:  [18-20] 18  SpO2:  [95 %-98 %] 97 %  BP: (104-119)/(53-72) 115/59     Weight: 104.3 kg (230 lb)  Body mass index is 33.97 kg/m².    Estimated Creatinine Clearance: 47.5 mL/min (A) (based on SCr " of 1.5 mg/dL (H)).    Physical Exam   Constitutional: He is oriented to person, place, and time. He appears well-developed and well-nourished. No distress.   HENT:   Head: Normocephalic and atraumatic.   Poor dentition    Eyes: Conjunctivae are normal. No scleral icterus.   Neck: Normal range of motion.   Cardiovascular: Normal rate. An irregularly irregular rhythm present.   No murmur heard.  Pulmonary/Chest: Effort normal and breath sounds normal. No respiratory distress. He has no wheezes.   Abdominal: Soft. He exhibits no distension. There is no tenderness.   Musculoskeletal: He exhibits no edema.   Right foot wrapped.  Old drainage noted.   See Podiatry Photos.  Noted to have exposed bone  No ascending erythema   Neurological: He is alert and oriented to person, place, and time.   Skin: Skin is warm and dry. He is not diaphoretic.   Psychiatric: He has a normal mood and affect. His behavior is normal.   Vitals reviewed.    12/12            Significant Labs:   Blood Culture:   Recent Labs   Lab 12/06/18 1951   LABBLOO No growth after 5 days.  No growth after 5 days.     CBC:   Recent Labs   Lab 12/12/18  0500 12/13/18  0311   WBC 8.47 8.07   HGB 11.2* 10.9*   HCT 35.7* 34.7*    211     CMP:   Recent Labs   Lab 12/12/18  0500 12/13/18  0311   * 139   K 4.7 4.9    103   CO2 22* 25   * 189*   BUN 26* 23   CREATININE 1.7* 1.5*   CALCIUM 9.9 10.0   ANIONGAP 13 11   EGFRNONAA 37.5* 43.7*     Wound Culture:   Recent Labs   Lab 12/07/18  1122   LABAERO No growth     All pertinent labs within the past 24 hours have been reviewed.    Significant Imaging: I have reviewed all pertinent imaging results/findings within the past 24 hours.

## 2018-12-13 NOTE — PROGRESS NOTES
[facility]  Infectious Disease  Progress Note    Patient Name: Jai Godinez  MRN: [id number]  Admission Date: 12/6/2018  Length of Stay: 7 days  Attending Physician: Citlail Bazan MD  Primary Care Provider: Pepe Valverde MD    Isolation Status: No active isolations  Assessment/Plan:      Osteomyelitis of right foot       78 year old man with DM, CHF, severe PAD who was transferred from the [facility] for vascular surgery evaluation s/p right great toe amputation on 12/3.  His post-operative course was complicated by loss of blood flow and wound was left open.  Because of kidney disease he was transferred here for CO2 angiography.  Cultures from [facility] showed MSSA and Group G strep.  Blood cultures were negative.  Per his wife, cultures were from bone and [facility].        Blood cultures here NGTD.  New wound cultures  NGTD.   Currently on IV cefazolin.  POD#2 CO2 angiogram which showed inline flow to level of right ankle via large peroneal, which arborizes and provides collateral perfusion to foot, though poor blood flow to right foot for which there is no recommended surgical intervention. Vascular Surgery recommends HBO therapy.         Per discussions with Primary Team, Podiatry has also seen patient and provided patient with option  (1) for further I&D, closure of wound and long term antibiotics; (2) TMA; (3) below ankle amputation.  Awaiting Podiatry note of today.   Patient would like to try option #1, debridement and long term IV abx.        Reviewed outside records.  Op note indicates extensive bone necrosis into first metatarsal.  Does not appear clean margins taken.  Cannot find path report.  Cultures + for Group G strep, MSSA, and anaerobes.        Plan now is for local wound care with wound vac application (per most recent Podiatry note), with follow up at [facility] for futher debridement.       Afebrile. No complaints. Remains on IV cefazolin     Plan/discharge  recommendations:  1.  Continue cefazolin 2 grams IV q 12 hours (renally dosed.  Discussed with ID PharmD).  2.  Add flagyl 500 mg orally q 8 hours for anaerobic coverage   3.  Anticipate 6 weeks of IV abx given with exposed bone.  Estimated end date 1/18/19  4.  Weekly cbc, cmp, crp, esr.  Until re-established with [facility] ID, fax results to ID at [phone]  5.  Will need ID follow up at [facility]   6.  Patient given my card and encouraged to call or have follow up ID call with any questions.   7.  Will sign off.  Please call or re-consult as needed.     Data reviewed and plan discussed with ID staff           Thank you.   Please call for any questions or concerns.  CONSUELO Valero, ANP-C  [phone]    Subjective:     Principal Problem:Amputated great toe of right foot    HPI:   Mr. Godinez is a 78 year old man with DM, CHF, severe PAD who was transferred from the [facility] for vascular surgery evaluation s/p right great toe amputation on 12/3.  Patient reports history of hospitalization in [place] from 11/14 to 11/21 for heart failure exacerbation.  He subsequently developed swelling of the right great toe associated with associated erythema. Per patient, he had had a small wound/callus on the toe for some time.   He cut the skin of his toe with his pocket knife, producing purulent discharge, [facility]  DANILO showed severe PAD and he underwent right toe amputation on 12/3.  His post-operative course was complicated by loss of blood flow and wound was left open.  Because of kidney disease he was transferred here for CO2 angiography.      There is also concern for osteomyelitis.  Wound cultures (? Bone cultures)  from [facility] showed MSSA and Group G strep.  Blood cultures were negative.  Per his wife, cultures were from bone and ID was consulted.  Initially treated with vancomycin and ertapenem per wife, then transitioned to IV ceftriaxone.    ID is consulted for stop date recommendations for antibiotics.  "    Denies associated fevers, chills, systemic symptoms.  Complaining of a "cramping" pain in his right foot.   Interval History:   POD#2 CO2 angiogram which showed "inline flow to level of right ankle via large peroneal, which arborizes and provides collateral perfusion to foot, though poor blood flow to right foot for which there is no recommended surgical intervention"  Vascular recommends HBO.     Reviewed outside records.  Does not appear clean margins taken.  Cultures + for Group G strep, MSSA, and anaerobes.       Plan now is for local wound care with wound vac application (per most recent Podiatry note), with follow up at [facility] for futher debridement.      Afebrile. No complaints. Remains on IV cefazolin     Review of Systems   Constitutional: Negative for activity change, appetite change, chills, diaphoresis, fatigue and fever.   HENT: Negative.    Eyes: Negative.    Respiratory: Negative for cough, chest tightness and shortness of breath.    Cardiovascular: Positive for leg swelling. Negative for chest pain and palpitations.   Gastrointestinal: Negative for abdominal distention, abdominal pain, diarrhea, nausea and vomiting.   Genitourinary: Negative for difficulty urinating and dysuria.   Musculoskeletal: Negative for back pain, joint swelling, myalgias and neck pain.        Cramping pain right foot   Skin: Positive for wound. Negative for color change, pallor and rash.   Neurological: Negative for dizziness and headaches.   Hematological: Negative.    Psychiatric/Behavioral: Negative for agitation and confusion.     Objective:     Vital Signs (Most Recent):  Temp: 98.3 °F (36.8 °C) (12/13/18 1227)  Pulse: 71 (12/13/18 1227)  Resp: 18 (12/13/18 0747)  BP: (!) 115/59 (12/13/18 1227)  SpO2: 97 % (12/13/18 1227) Vital Signs (24h Range):  Temp:  [96.6 °F (35.9 °C)-98.3 °F (36.8 °C)] 98.3 °F (36.8 °C)  Pulse:  [51-71] 71  Resp:  [18-20] 18  SpO2:  [95 %-98 %] 97 %  BP: (104-119)/(53-72) 115/59     Weight: 104.3 " kg (230 lb)  Body mass index is 33.97 kg/m².    Estimated Creatinine Clearance: 47.5 mL/min (A) (based on SCr of 1.5 mg/dL (H)).    Physical Exam   Constitutional: He is oriented to person, place, and time. He appears well-developed and well-nourished. No distress.   HENT:   Head: Normocephalic and atraumatic.   Poor dentition    Eyes: Conjunctivae are normal. No scleral icterus.   Neck: Normal range of motion.   Cardiovascular: Normal rate. An irregularly irregular rhythm present.   No murmur heard.  Pulmonary/Chest: Effort normal and breath sounds normal. No respiratory distress. He has no wheezes.   Abdominal: Soft. He exhibits no distension. There is no tenderness.   Musculoskeletal: He exhibits no edema.   Right foot wrapped.  Old drainage noted.   See Podiatry Photos.  Noted to have exposed bone  No ascending erythema   Neurological: He is alert and oriented to person, place, and time.   Skin: Skin is warm and dry. He is not diaphoretic.   Psychiatric: He has a normal mood and affect. His behavior is normal.   Vitals reviewed.    12/12            Significant Labs:   Blood Culture:   Recent Labs   Lab 12/06/18 1951   LABBLOO No growth after 5 days.  No growth after 5 days.     CBC:   Recent Labs   Lab 12/12/18  0500 12/13/18  0311   WBC 8.47 8.07   HGB 11.2* 10.9*   HCT 35.7* 34.7*    211     CMP:   Recent Labs   Lab 12/12/18  0500 12/13/18  0311   * 139   K 4.7 4.9    103   CO2 22* 25   * 189*   BUN 26* 23   CREATININE 1.7* 1.5*   CALCIUM 9.9 10.0   ANIONGAP 13 11   EGFRNONAA 37.5* 43.7*     Wound Culture:   Recent Labs   Lab 12/07/18  1122   LABAERO No growth     All pertinent labs within the past 24 hours have been reviewed.    Significant Imaging: I have reviewed all pertinent imaging results/findings within the past 24 hours.

## 2018-12-13 NOTE — NURSING
AA&Ox4, Resp unlabored, pain relieved with PO Gabapentin.  Tolerated diet well.  Remains free from falls.  Wound care to Rt foot done per podiatry.  Wound vac set up placed at bedside for Podiatry.  Orders entered for PICC placement in anticipation for long term antibiotics upon discharge.  Tolerated IV antibiotics well this shift/

## 2018-12-13 NOTE — PROGRESS NOTES
[facility]  Podiatry  Progress Note    Patient Name: Jai Godinez  MRN: [id number]  Admission Date: 12/6/2018  Hospital Length of Stay: 7 days  Attending Physician: Citlali Bazan MD  Primary Care Provider: Pepe Valverde MD   Interval Hx: Pt seen at bedside, no pedal complaints at this time.     Scheduled Meds:   ceFAZolin (ANCEF) IVPB  2 g Intravenous Q12H    furosemide  40 mg Oral BID    gabapentin  100 mg Oral BID    insulin aspart U-100  2 Units Subcutaneous TIDWM    pravastatin  40 mg Oral Daily     Continuous Infusions:   heparin (porcine) in D5W 19 Units/kg/hr (12/13/18 0907)     PRN Meds:acetaminophen, acetaminophen, acetaminophen, dextrose 50%, dextrose 50%, diphenhydrAMINE-zinc acetate 1-0.1%, glucagon (human recombinant), glucose, glucose, heparin (PORCINE), heparin (PORCINE), heparin, porcine (PF), insulin aspart U-100, sodium chloride 0.9%    Review of patient's allergies indicates:  No Known Allergies     Past Medical History:   Diagnosis Date    CHF (congestive heart failure)     Coronary artery disease     Diabetes mellitus     Diverticulosis     Hx of CABG     Hypertension      Past Surgical History:   Procedure Laterality Date    Angiogram Extremity Unilateral - L CFA access, RLE angio with CO2 contrast N/A 12/11/2018    Performed by Peng Orr MD at [facility] OR [address] [place] [address]    ANGIOGRAPHY OF LOWER EXTREMITY N/A 12/11/2018    Procedure: Angiogram Extremity Unilateral - L CFA access, RLE angio with CO2 contrast;  Surgeon: Peng Orr MD;  Location: [facility] OR [address] [place] [address];  Service: Peripheral Vascular;  Laterality: N/A;  local:7ml  fluro:2.9min  mGy:576.22  co2: 200cc  contrast:8ml          APPENDECTOMY      BYPASS GRAFT         Family History     Problem Relation (Age of Onset)    Arthritis Sister    Dementia Mother    No Known Problems Father        Tobacco Use    Smoking status: Former Smoker     Packs/day: 1.00     Years: 25.00     Pack years: 25.00      Types: Cigarettes    Smokeless tobacco: Never Used   Substance and Sexual Activity    Alcohol use: No     Frequency: Never    Drug use: No    Sexual activity: Not Currently     Partners: Female     Review of Systems   Constitutional: Negative for chills, diaphoresis, fatigue and fever.   Respiratory: Negative for chest tightness and shortness of breath.    Cardiovascular: Positive for leg swelling. Negative for chest pain.   Gastrointestinal: Negative for nausea and vomiting.   Skin: Positive for color change and wound.   Neurological: Positive for numbness.     Objective:     Vital Signs (Most Recent):  Temp: 96.6 °F (35.9 °C) (12/13/18 0747)  Pulse: (!) 58 (12/13/18 0747)  Resp: 18 (12/13/18 0747)  BP: (!) 109/53 (12/13/18 0747)  SpO2: 98 % (12/13/18 0747) Vital Signs (24h Range):  Temp:  [96.6 °F (35.9 °C)-98.2 °F (36.8 °C)] 96.6 °F (35.9 °C)  Pulse:  [51-91] 58  Resp:  [18-20] 18  SpO2:  [95 %-98 %] 98 %  BP: (104-140)/(53-84) 109/53     Weight: 104.3 kg (230 lb)  Body mass index is 33.97 kg/m².    Foot Exam    General  General Appearance: appears stated age and healthy   Orientation: alert and oriented to person, place, and time   Affect: appropriate       Right Foot/Ankle     Inspection and Palpation  Ecchymosis: none  Skin Exam: skin changes, abnormal color, ulcer and erythema; no drainage and no cellulitis     Neurovascular  Dorsalis pedis: absent  Posterior tibial: absent  Saphenous nerve sensation: diminished  Tibial nerve sensation: diminished  Superficial peroneal nerve sensation: diminished  Deep peroneal nerve sensation: diminished  Sural nerve sensation: diminished            Laboratory:  A1C: No results for input(s): HGBA1C in the last 4320 hours.  Blood Cultures:   No results for input(s): LABBLOO in the last 48 hours.  BMP:   Recent Labs   Lab 12/13/18 0311   *      K 4.9      CO2 25   BUN 23   CREATININE 1.5*   CALCIUM 10.0     CBC:   Recent Labs   Lab 12/13/18 0311    WBC 8.07   RBC 3.71*   HGB 10.9*   HCT 34.7*      MCV 94   MCH 29.4   MCHC 31.4*     CMP:   Recent Labs   Lab 12/06/18  1950  12/13/18 0311   *   < > 189*   CALCIUM 10.5   < > 10.0   ALBUMIN 2.9*  --   --    PROT 8.5*  --   --       < > 139   K 4.8   < > 4.9   CO2 20*   < > 25      < > 103   BUN 37*   < > 23   CREATININE 1.6*   < > 1.5*   ALKPHOS 90  --   --    ALT 37  --   --    AST 28  --   --    BILITOT 0.6  --   --     < > = values in this interval not displayed.     Coagulation:   Recent Labs   Lab 12/06/18  1950  12/13/18 0311   INR 1.2  --   --    APTT  --    < > 42.5*    < > = values in this interval not displayed.     CRP: No results for input(s): CRP in the last 168 hours.  ESR:   Recent Labs   Lab 12/07/18  0459   SEDRATE 75*     Prealbumin: No results for input(s): PREALBUMIN in the last 48 hours.  Wound Cultures:   Recent Labs   Lab 12/07/18 1122   LABAERO No growth     Microbiology Results (last 7 days)     Procedure Component Value Units Date/Time    Blood culture (site 1) [[id number]] Collected:  12/06/18 1951    Order Status:  Completed Specimen:  Blood Updated:  12/11/18 2212     Blood Culture, Routine No growth after 5 days.    Narrative:       Site # 1, aerobic and anaerobic    Blood culture (site 2) [[id number]] Collected:  12/06/18 1951    Order Status:  Completed Specimen:  Blood Updated:  12/11/18 2212     Blood Culture, Routine No growth after 5 days.    Narrative:       Site # 2, aerobic only    Culture, Anaerobe [[id number]] Collected:  12/07/18 1122    Order Status:  Completed Specimen:  Wound from Foot, Right Updated:  12/11/18 0903     Anaerobic Culture Culture in progress    Aerobic culture [[id number]] Collected:  12/07/18 1122    Order Status:  Completed Specimen:  Wound from Foot, Right Updated:  12/10/18 0933     Aerobic Bacterial Culture No growth        Specimen (12h ago, onward)    None          Diagnostic Results:  X-Ray: I have reviewed all pertinent  results/findings within the past 24 hours.  Ordered    Clinical Findings:    S/p R partial 1st ray amp with bone exposure. Granular base with ischemic appearing wound edges. No active bleeding noted. No purulence or acute SOI noted. No malodor. Stable.               Assessment/Plan:     * Amputated great toe of right foot    -Pt with ischemic wound and exposed bone s/p right partial 1st ray amputation performed at [facility] on 12/3/18. Wound left open with bone exposure. No acute SOI noted stable  -Vasc sx on board, appreciate recs  -Plan for local wound care for now with wound vac application, plan for further debridement in future with podiatry at [facility]. Recommend hyperbaric oxygen therapy.   -ABX per ID  -HH to change wound vac 2 x per week with adaptic to cover the bone followed by black sponge and set to 75 mmHg, low pressure.   -Plan to f/u with podiatry at [facility] for further debridement  -Ok to D/C from podiatry standpoint  -Podiatry will follow            Stage 3 chronic kidney disease    Per primary     Osteomyelitis of right foot    Pt s/p amputation with exposed bone, no acute SOI noted, stable.   ID on board for management of OM, appreciate recs     Type 2 diabetes mellitus, without long-term current use of insulin    Per primary     Peripheral arterial disease    Vasc sx on board, appreciate recs         Dorita Greenberg MD  Podiatry  [facility]

## 2018-12-13 NOTE — PROCEDURES
"Jai Godinez is a 78 y.o. male patient.    Temp: 96.6 °F (35.9 °C) (12/13/18 0747)  Pulse: (!) 58 (12/13/18 0747)  Resp: 18 (12/13/18 0747)  BP: (!) 109/53 (12/13/18 0747)  SpO2: 98 % (12/13/18 0747)  Weight: 104.3 kg (230 lb) (12/09/18 0600)  Height: 5' 9" (175.3 cm) (12/06/18 1934)    PICC  Date/Time: 12/13/2018 10:33 AM  Performed by: Prabhjot Lange RN  Consent Done: Yes  Time out: Immediately prior to procedure a time out was called to verify the correct patient, procedure, equipment, support staff and site/side marked as required  Indications: med administration and vascular access  Anesthesia: local infiltration  Local anesthetic: lidocaine 1% without epinephrine  Anesthetic Total (mL): 2  Preparation: skin prepped with ChloraPrep  Skin prep agent dried: skin prep agent completely dried prior to procedure  Sterile barriers: all five maximum sterile barriers used - cap, mask, sterile gown, sterile gloves, and large sterile sheet  Hand hygiene: hand hygiene performed prior to central venous catheter insertion  Location details: right basilic  Catheter type: double lumen  Catheter size: 5 Fr  Catheter Length: 41cm    Ultrasound guidance: yes  Vessel Caliber: medium and patent, compressibility normal  Vascular Doppler: not done  Needle advanced into vessel with real time Ultrasound guidance.  Guidewire confirmed in vessel.  Image recorded and saved.  Sterile sheath used.  Manometry: esophageal manometry  Number of attempts: 1  Post-procedure: blood return through all ports, chlorhexidine patch and sterile dressing applied  Specimens: No  Implants: No  Assessment: placement verified by x-ray  Complications: none          Lizett Burton  12/13/2018  "

## 2018-12-13 NOTE — ASSESSMENT & PLAN NOTE
· Concern for possible right foot osteomyelitis with wound cultures growing MSSA and Group G Streptococcus; blood cultures negative; ID there treated with Ceftriaxone while in-patient, with plans to discharge on Doxycycline. [facility]   · Status post debridement by podiatry at [facility] prior to transfer.   · Unclear if patient had imaging studies suggesting or confirming.   · Afebrile. No leukocytosis. ESR 75. CRP 93.    · Blood cultures ordered.   · ID consulted for recommendations regarding correct choice of antibiotic at [facility] and anticipated stop date of course. Status post one day of Ceftriaxone. Day #7 of Cefazolin per ID recs; ID anticipates 6 weeks of IV ABX as with exposed bone. PICC line placed today.   · Podiatry consulted; wound cultures ordered. Wound to be dressed with Betadyne and Kerflex for now. Recommending wound vac at discharge.   · Podiatry evaluated; plan is for outpatient debridement at [facility] possibly next week.     ID discharge recommendations are as follows:   1.  Continue cefazolin 2 grams IV Q12 hours (renally dosed.  Discussed with ID PharmD).  2.  Add flagyl 500 mg orally Q8 hours for anaerobic coverage   3.  Anticipate 6 weeks of IV abx given with exposed bone.  Estimated end date 1/18/19  4.  Weekly cbc, cmp, crp, esr.  Until re-established with [facility] ID, fax results to ID at [phone]  5.  Will need ID follow up at [facility]

## 2018-12-13 NOTE — ASSESSMENT & PLAN NOTE
· Last EF 40-50% at [facility] per transfer note.   · No signs of fluid overload on exam.   · Managed with Lasix 40 mg BID, Lisinopril 40 mg, and Metoprolol daily.   · Holding Metoprolol temporarily with HR on admission in lower 60's and Lisinopril with possible STACY.  Lisinopril should be changed to ARB on discharge given history of chronic dry cough and worsening systemic rash after Lisinopril dose was increased some months ago.  · Will order cardiac and diabetic diet.

## 2018-12-13 NOTE — PROGRESS NOTES
[facility] Hospital Medicine  Progress Note    Patient Name: Jai Godinez  MRN: [id number]  Patient Class: IP- Inpatient   Admission Date: 12/6/2018  Length of Stay: 7 days  Attending Physician: Citlali Bazan MD  Primary Care Provider: Pepe Valverde MD    [facility] Medicine Team: [facility] HOSP MED 5 Miguel Angel Jones MD    Subjective:     Principal Problem:Amputated great toe of right foot    HPI:  This is a 78 year old female transferred from the [facility] for vascular surgery repair of right great toe status post amputation. He has a PMH of quadruple bypass (2001), severe peripheral artery disease, HFrEF (EF 45-50%), T2DM (A1c 8.1), HTN, SANDIE (on CPAP nightly) and diverticulosis.     He was hospitalized in [place] from 11/14 to 11/21 for acute on chronic heart failure exacerbation; discharge home with resolution of symptoms. He then developed one day of progressively worsening swelling of the right great toe associated with erythematous skin changes; for relief, he cut the skin of the toe with his pocket knife, at which point noticed purulent discharge, [facility]     Patient underwent right toe amputation done on 12/3 for pre-operative DANILO indicative of severe PAD. His post-operative course was complicated by development non-pulsatile blood flow after, as a result podiatry has left wound open. Vascular  at [facility] wanted to due angioplasty post-op, but CO2 angiography wasn't available. Also, concern for possible right foot osteomyelitis with wound cultures growing MSSA and Group G Streptococcus; blood cultures negative; ID there treated with Ceftriaxone while in-patient, with plans to discharge on Doxycycline.  In addition, on presentation, he was noted to have new development of Afib with slow ventricular response; rate control with Metoprolol and started on heparin drip while inpatient; transitioned to Dabigatran prior to transfer.     Patient/family requested transfer elsewhere  to accomplish procedure. [facility] Transfer center contacted today and case was discussed with Dr. Patricia Sweeney,  who requested patient to transfer to hospital medicine with vascular surgery consults.        On arrival here, he reports episodic cramping pain isolated to the amputation site occurring approximately Q5min. Pain relieved by taking Tylenol PRN. Denies fevers, chills, chest pain, SOB, palpitations, numbness of extremities.         Hospital Course:  12/07: No acute events overnight. Vascular surgery assessed this morning; recommended repeat DANILO and tentative angiogram today. Wound care and podiatry consulted for open wound and right foot osteomyelitis.  Wound care defering to podiatry. Podiatry repeating cultures and XR of foot. ID consulted for antibiotic recs; Ceftriaxone changed to Cefazolin (day #1). LR maintenance started to attempt optimization of kidney function; d/c home Lisinopril. [facility].   12/08: No events overnight. DANILO showing mild PAD. Scheduled for angiogram 12/12. BUN and Cr improved to 29 and 1.4 with maintenance LR; GFR 47. ESR 93, CRP 75. Day #2 of Cefazolin.   12/09: No events overnight. Day #3 of Cefazolin. Renal function same. Tylenol PRN changed to Gabapentin with pain improvement.   12/10: No events overnight. Day #4 of Cefazolin. Renal function same. Evaluated by Vascular; angiogram moved up to tomorrow. NPO at midnight.   12/11: No events overnight. Angiogram scheduled today. Day #5 of Cefazolin.   12/12: Angiogram yesterday concerning for no significant blood flow with collaterals below the right ankle. Vascular recommended hyperbaric oxygen as possible treatment modality, but not likely option given patient's insurance status. Discussed case with podiatry; will speak to vascular and discuss blood flow to area for possible amputation. No events overnight. Day #6 of Cefazolin.   12/13: No events overnight. Podiatry evaluated; planning for [facility]  [facility], with attempted treatment with IV antibiotics and wound vac, before amputation. PICC line placed today. Home health orders placed for attempted insurance coverage with [facility]; still waiting on answer. Day #7 of Cefazolin; per ID final recommendations will anticipate 6 week course of IV Cefazolin 2 g Q12H with Flagyl 500 mg Q8H with anticipated end date of 01/18/19 based on culture results from [facility].     Interval History: Patient and wife seen at bedside this AM. They had no complaints. He is tolerating PO, urinating normally, and having BM. Patient requesting walker if possible upon anticipated discharge home to assist with ambulation.         Review of Systems   Constitutional: Negative for appetite change, chills, diaphoresis, fatigue and fever.   HENT: Negative for congestion, sore throat and trouble swallowing.    Eyes: Negative for photophobia, pain and discharge.   Respiratory: Negative for cough, chest tightness and shortness of breath.    Cardiovascular: Negative for chest pain, palpitations and leg swelling.   Gastrointestinal: Negative for abdominal pain, diarrhea, nausea and vomiting.   Genitourinary: Negative for decreased urine volume, difficulty urinating and urgency.   Musculoskeletal: Positive for gait problem. Negative for back pain, myalgias and neck pain.   Skin: Positive for wound. Negative for pallor and rash.   Neurological: Negative for dizziness, weakness, light-headedness, numbness and headaches.     Objective:     Vital Signs (Most Recent):  Temp: 96.6 °F (35.9 °C) (12/13/18 0747)  Pulse: (!) 58 (12/13/18 0747)  Resp: 18 (12/13/18 0747)  BP: (!) 109/53 (12/13/18 0747)  SpO2: 98 % (12/13/18 0747) Vital Signs (24h Range):  Temp:  [96.6 °F (35.9 °C)-98.2 °F (36.8 °C)] 96.6 °F (35.9 °C)  Pulse:  [51-91] 58  Resp:  [18-20] 18  SpO2:  [95 %-98 %] 98 %  BP: (104-140)/(53-84) 109/53     Weight: 104.3 kg (230 lb)  Body mass index is 33.97 kg/m².    Intake/Output  Summary (Last 24 hours) at 12/13/2018 0941  Last data filed at 12/13/2018 0500  Gross per 24 hour   Intake 901.14 ml   Output --   Net 901.14 ml      Physical Exam   Constitutional: He is oriented to person, place, and time. He appears well-developed and well-nourished. No distress.   HENT:   Head: Normocephalic and atraumatic.   Mouth/Throat: Uvula is midline, oropharynx is clear and moist and mucous membranes are normal.   Eyes: Conjunctivae are normal. Pupils are equal, round, and reactive to light.   Neck: No JVD present.   Cardiovascular: Normal rate and normal heart sounds. An irregularly irregular rhythm present.   No murmur heard.  Pulses:       Radial pulses are 2+ on the right side, and 2+ on the left side.        Dorsalis pedis pulses are 1+ on the right side, and 2+ on the left side.   Pulmonary/Chest: Effort normal and breath sounds normal. No respiratory distress.   Abdominal: Soft. Normal appearance and bowel sounds are normal. He exhibits no distension. There is no tenderness.   Musculoskeletal:   Extensive 3 cm open wound on right foot near site of 1st toe amputation with exposure of underlying bones. No purulent discharge. Dressing clean, dry, intact.    Neurological: He is alert and oriented to person, place, and time.   Skin: Skin is warm and dry. No bruising and no rash noted.       Significant Labs:   CBC:   Recent Labs   Lab 12/12/18  0500 12/13/18  0311   WBC 8.47 8.07   HGB 11.2* 10.9*   HCT 35.7* 34.7*    211     CMP:   Recent Labs   Lab 12/12/18  0500 12/13/18  0311   * 139   K 4.7 4.9    103   CO2 22* 25   * 189*   BUN 26* 23   CREATININE 1.7* 1.5*   CALCIUM 9.9 10.0   ANIONGAP 13 11   EGFRNONAA 37.5* 43.7*     All pertinent labs within the past 24 hours have been reviewed.    Significant Imaging: I have reviewed all pertinent imaging results/findings within the past 24 hours.  I have reviewed and interpreted all pertinent imaging results/findings within the past  24 hours.    Assessment/Plan:      * Amputated great toe of right foot    · Status post amputation of first toe of right foot on 12/03 at [facility] secondary to either severe PAD with possible super-imposed osteomyelitis.   · [facility] that post[facility] course course was complicated by development non-pulsatile blood flow after, as a result podiatry has left wound open. Vascular at [facility] wanted to due angioplasty post-op, but CO2 angiography wasn't available, prompting transfer here. See vascular surgery and podiatry note for images of wound.   · Case discussed with Dr. Gomez in Vascular Surgery here at transfer. Vascular surgery evaluated; recommended repeating DANILO's and tentatively planning for angiogram. DANILO's ordered; only showing mild PAD. Angiogram on 12/11 concerning for no significant blood flow with collaterals below the right ankle; recommended hyperbaric O2 as treatment modality, but not likely option given patient's insurance status. Podiatry re-evaluated; planning on outpatient debridement at [facility] with attempted treatment with IV antibiotics and wound vac, before possible amputation.    · Exam notable for diminished pulses in right foot and lower extremity. .   · Continuing heparin drip at low intensity for anticoagulation with new Afib.   · Wound care consulted; deferred care recommendations to podiatry.   · Tylenol PRN changed to Gabapentin 100 mg TID with improved control.   · PT/OT ordered.        Open wound of right foot    See assessment for right great toe amputation and right foot osteomyelitis.        Stage 3 chronic kidney disease    · Improved.   · Unclear of baseline; no outside records available. May prevent patient from receiving regular angiogram vs with CO2.   · Could be acute, but patient with history of poorly controlled T2DM.   · BUN 37, Cr 1.6, and GFR 40 on admission.    · Holding home Lisinopril in setting of possible STACY.   · Renal function plateau with GFR at 47 last few days,  diminished to 37 yesterday likely secondary to angiogram, but improved back to 50 today.  · BMP daily.           Osteomyelitis of right foot    · Concern for possible right foot osteomyelitis with wound cultures growing MSSA and Group G Streptococcus; blood cultures negative; ID there treated with Ceftriaxone while in-patient, with plans to discharge on Doxycycline. [facility].   · Status post debridement by podiatry at [facility] prior to transfer.   · Unclear if patient had imaging studies suggesting or confirming.   · Afebrile. No leukocytosis. ESR 75. CRP 93.    · Blood cultures ordered.   · ID consulted for recommendations regarding correct choice of antibiotic at [facility] and anticipated stop date of course. Status post one day of Ceftriaxone. Day #7 of Cefazolin per ID recs; ID anticipates 6 weeks of IV ABX as with exposed bone. PICC line placed today.   · Podiatry consulted; wound cultures ordered. Wound to be dressed with Betadyne and Kerflex for now. Recommending wound vac at discharge.   · Podiatry evaluated; plan is for outpatient debridement at [facility] possibly next week.     ID discharge recommendations are as follows:   1.  Continue cefazolin 2 grams IV Q12 hours (renally dosed.  Discussed with ID PharmD).  2.  Add flagyl 500 mg orally Q8 hours for anaerobic coverage   3.  Anticipate 6 weeks of IV abx given with exposed bone.  Estimated end date 1/18/19  4.  Weekly cbc, cmp, crp, esr.  Until re-established with [facility] ID, fax results to ID at [phone]  5.  Will need ID follow up at [facility]       Atrial fibrillation with slow ventricular response    · Irregular irregular rhythm on exam with HR controlled in lower 60's.   · May restart home Metoprolol at 12.5 mg with parameters to hold for HR <65.   · Started on heparin infusion at [facility] with plans to transition with Dabigatran.   · INR 1.2 on admission.   · Started heparin infusion here with low intensity and following aPTT. APTT 38  today.  · [facility] records mention plan to discharge patient on Dabigatran based on approved coverage. Will continue heparin infusion until discharge recommendations complete from consults.    · Will need follow-up outpatient with cardiology at [facility].        Chronic systolic heart failure    · Last EF 40-50% at [facility] per transfer note.   · No signs of fluid overload on exam.   · Managed with Lasix 40 mg BID, Lisinopril 40 mg, and Metoprolol daily.   · Holding Metoprolol temporarily with HR on admission in lower 60's and Lisinopril with possible STACY.  Lisinopril should be changed to ARB on discharge given history of chronic dry cough and worsening systemic rash after Lisinopril dose was increased some months ago.  · Will order cardiac and diabetic diet.         Essential hypertension    · Controlled.   · Resuming Lasix, and holding Metoprolol with HR in lower 60's.   · Discontinued home Lisinopril because of concern for possible acute on chronic STACY, but patient and wife reports history of dry cough and worsening systemic erythematous rash after Lisinopril dose was increased some months ago. Will enter as allergy in patient's chart.   · Will likely discharge on ARB instead of ACE-I.        Type 2 diabetes mellitus, without long-term current use of insulin    · Last A1c 8 at outside facility.   · Holding home Metformin and Glipizide  · Low dose sliding scale ordered on admission.        Peripheral arterial disease    See assessment for amputation of great right toe.          VTE Risk Mitigation (From admission, onward)        Ordered     heparin 25,000 units in dextrose 5% 250 mL (100 units/mL) infusion LOW INTENSITY nomogram - [facility]  Continuous      12/11/18 1710     heparin 25,000 units in dextrose 5% (100 units/ml) IV bolus from bag - ADDITIONAL PRN BOLUS - 60 units/kg  As needed (PRN)      12/11/18 1710     heparin 25,000 units in dextrose 5% (100 units/ml) IV bolus from bag - ADDITIONAL PRN BOLUS - 30 units/kg  As needed  (PRN)      12/11/18 1710     heparin, porcine (PF) 100 unit/mL injection flush 10 Units  As needed (PRN)      12/06/18 2119     IP VTE HIGH RISK PATIENT  Once      12/06/18 1855              Miguel Angel Jones MD  Department of Hospital Medicine   [facility]

## 2018-12-13 NOTE — ASSESSMENT & PLAN NOTE
· Improved.   · Unclear of baseline; no outside records available. May prevent patient from receiving regular angiogram vs with CO2.   · Could be acute, but patient with history of poorly controlled T2DM.   · BUN 37, Cr 1.6, and GFR 40 on admission.    · Holding home Lisinopril in setting of possible STACY.   · Renal function plateau with GFR at 47 last few days, diminished to 37 yesterday likely secondary to angiogram, but improved back to 50 today.  · BMP daily.

## 2018-12-13 NOTE — ASSESSMENT & PLAN NOTE
· Irregular irregular rhythm on exam with HR controlled in lower 60's.   · May restart home Metoprolol at 12.5 mg with parameters to hold for HR <65.   · Started on heparin infusion at [facility] with plans to transition with Dabigatran.   · INR 1.2 on admission.   · Started heparin infusion here with low intensity and following aPTT. APTT 38 today.  · [facility] records mention plan to discharge patient on Dabigatran based on approved coverage. Will continue heparin infusion until discharge recommendations complete from consults.    · Will need follow-up outpatient with cardiology at [facility].

## 2018-12-13 NOTE — SUBJECTIVE & OBJECTIVE
Interval History: Patient and wife seen at bedside this AM. They had no complaints. He is tolerating PO, urinating normally, and having BM. Patient requesting walker if possible upon anticipated discharge home to assist with ambulation.         Review of Systems   Constitutional: Negative for appetite change, chills, diaphoresis, fatigue and fever.   HENT: Negative for congestion, sore throat and trouble swallowing.    Eyes: Negative for photophobia, pain and discharge.   Respiratory: Negative for cough, chest tightness and shortness of breath.    Cardiovascular: Negative for chest pain, palpitations and leg swelling.   Gastrointestinal: Negative for abdominal pain, diarrhea, nausea and vomiting.   Genitourinary: Negative for decreased urine volume, difficulty urinating and urgency.   Musculoskeletal: Positive for gait problem. Negative for back pain, myalgias and neck pain.   Skin: Positive for wound. Negative for pallor and rash.   Neurological: Negative for dizziness, weakness, light-headedness, numbness and headaches.     Objective:     Vital Signs (Most Recent):  Temp: 96.6 °F (35.9 °C) (12/13/18 0747)  Pulse: (!) 58 (12/13/18 0747)  Resp: 18 (12/13/18 0747)  BP: (!) 109/53 (12/13/18 0747)  SpO2: 98 % (12/13/18 0747) Vital Signs (24h Range):  Temp:  [96.6 °F (35.9 °C)-98.2 °F (36.8 °C)] 96.6 °F (35.9 °C)  Pulse:  [51-91] 58  Resp:  [18-20] 18  SpO2:  [95 %-98 %] 98 %  BP: (104-140)/(53-84) 109/53     Weight: 104.3 kg (230 lb)  Body mass index is 33.97 kg/m².    Intake/Output Summary (Last 24 hours) at 12/13/2018 0941  Last data filed at 12/13/2018 0500  Gross per 24 hour   Intake 901.14 ml   Output --   Net 901.14 ml      Physical Exam   Constitutional: He is oriented to person, place, and time. He appears well-developed and well-nourished. No distress.   HENT:   Head: Normocephalic and atraumatic.   Mouth/Throat: Uvula is midline, oropharynx is clear and moist and mucous membranes are normal.   Eyes:  Conjunctivae are normal. Pupils are equal, round, and reactive to light.   Neck: No JVD present.   Cardiovascular: Normal rate and normal heart sounds. An irregularly irregular rhythm present.   No murmur heard.  Pulses:       Radial pulses are 2+ on the right side, and 2+ on the left side.        Dorsalis pedis pulses are 1+ on the right side, and 2+ on the left side.   Pulmonary/Chest: Effort normal and breath sounds normal. No respiratory distress.   Abdominal: Soft. Normal appearance and bowel sounds are normal. He exhibits no distension. There is no tenderness.   Musculoskeletal:   Extensive 3 cm open wound on right foot near site of 1st toe amputation with exposure of underlying bones. No purulent discharge. Dressing clean, dry, intact.    Neurological: He is alert and oriented to person, place, and time.   Skin: Skin is warm and dry. No bruising and no rash noted.       Significant Labs:   CBC:   Recent Labs   Lab 12/12/18  0500 12/13/18  0311   WBC 8.47 8.07   HGB 11.2* 10.9*   HCT 35.7* 34.7*    211     CMP:   Recent Labs   Lab 12/12/18  0500 12/13/18  0311   * 139   K 4.7 4.9    103   CO2 22* 25   * 189*   BUN 26* 23   CREATININE 1.7* 1.5*   CALCIUM 9.9 10.0   ANIONGAP 13 11   EGFRNONAA 37.5* 43.7*     All pertinent labs within the past 24 hours have been reviewed.    Significant Imaging: I have reviewed all pertinent imaging results/findings within the past 24 hours.  I have reviewed and interpreted all pertinent imaging results/findings within the past 24 hours.

## 2018-12-13 NOTE — ASSESSMENT & PLAN NOTE
Pt with ischemic wound and exposed bone s/p right partial 1st ray amputation performed at [facility] on 12/3/18. No acute SOI noted stable    Plan:  - Pt was given treatment options: TMA vs further resection of the 1st met and primary closer vs wound vac vs proximal amputation  - It was explained to pt that d/t poor blood flow, he would likely need a proximal amputation but he would like to proceed with a minimal surgery for now.  - Unfortunately, there are no surgeons available to perform the surgery this week. [facility]   - Will plan for home vac until patient can get OP closure of wound.  - Would continue IV antibiotics per ID.   - Podiatry will continue to follow

## 2018-12-14 LAB
ANION GAP SERPL CALC-SCNC: 11 MMOL/L
APTT BLDCRRT: 39.1 SEC
BACTERIA SPEC ANAEROBE CULT: NORMAL
BASOPHILS # BLD AUTO: 0.08 K/UL
BASOPHILS NFR BLD: 1 %
BUN SERPL-MCNC: 23 MG/DL
CALCIUM SERPL-MCNC: 9.9 MG/DL
CHLORIDE SERPL-SCNC: 101 MMOL/L
CO2 SERPL-SCNC: 27 MMOL/L
CREAT SERPL-MCNC: 1.6 MG/DL
DIFFERENTIAL METHOD: ABNORMAL
EOSINOPHIL # BLD AUTO: 0.5 K/UL
EOSINOPHIL NFR BLD: 6.7 %
ERYTHROCYTE [DISTWIDTH] IN BLOOD BY AUTOMATED COUNT: 14.2 %
EST. GFR  (AFRICAN AMERICAN): 46.7 ML/MIN/1.73 M^2
EST. GFR  (NON AFRICAN AMERICAN): 40.4 ML/MIN/1.73 M^2
GLUCOSE SERPL-MCNC: 187 MG/DL
HCT VFR BLD AUTO: 35.6 %
HGB BLD-MCNC: 11.2 G/DL
IMM GRANULOCYTES # BLD AUTO: 0.05 K/UL
IMM GRANULOCYTES NFR BLD AUTO: 0.6 %
LYMPHOCYTES # BLD AUTO: 1.5 K/UL
LYMPHOCYTES NFR BLD: 18.5 %
MCH RBC QN AUTO: 28.6 PG
MCHC RBC AUTO-ENTMCNC: 31.5 G/DL
MCV RBC AUTO: 91 FL
MONOCYTES # BLD AUTO: 1 K/UL
MONOCYTES NFR BLD: 12.1 %
NEUTROPHILS # BLD AUTO: 4.9 K/UL
NEUTROPHILS NFR BLD: 61.1 %
NRBC BLD-RTO: 0 /100 WBC
PLATELET # BLD AUTO: 219 K/UL
PMV BLD AUTO: 10.8 FL
POCT GLUCOSE: 174 MG/DL (ref 70–110)
POCT GLUCOSE: 200 MG/DL (ref 70–110)
POCT GLUCOSE: 221 MG/DL (ref 70–110)
POCT GLUCOSE: 261 MG/DL (ref 70–110)
POTASSIUM SERPL-SCNC: 4.8 MMOL/L
RBC # BLD AUTO: 3.91 M/UL
SODIUM SERPL-SCNC: 139 MMOL/L
WBC # BLD AUTO: 7.95 K/UL

## 2018-12-14 PROCEDURE — 97530 THERAPEUTIC ACTIVITIES: CPT

## 2018-12-14 PROCEDURE — 80048 BASIC METABOLIC PNL TOTAL CA: CPT

## 2018-12-14 PROCEDURE — 97116 GAIT TRAINING THERAPY: CPT

## 2018-12-14 PROCEDURE — 36415 COLL VENOUS BLD VENIPUNCTURE: CPT

## 2018-12-14 PROCEDURE — 11000001 HC ACUTE MED/SURG PRIVATE ROOM

## 2018-12-14 PROCEDURE — 99232 SBSQ HOSP IP/OBS MODERATE 35: CPT | Mod: GC,,, | Performed by: HOSPITALIST

## 2018-12-14 PROCEDURE — 85730 THROMBOPLASTIN TIME PARTIAL: CPT

## 2018-12-14 PROCEDURE — 63600175 PHARM REV CODE 636 W HCPCS: Performed by: STUDENT IN AN ORGANIZED HEALTH CARE EDUCATION/TRAINING PROGRAM

## 2018-12-14 PROCEDURE — 63600175 PHARM REV CODE 636 W HCPCS: Performed by: NURSE PRACTITIONER

## 2018-12-14 PROCEDURE — 85025 COMPLETE CBC W/AUTO DIFF WBC: CPT

## 2018-12-14 PROCEDURE — 25000003 PHARM REV CODE 250: Performed by: NURSE PRACTITIONER

## 2018-12-14 PROCEDURE — 25000003 PHARM REV CODE 250: Performed by: STUDENT IN AN ORGANIZED HEALTH CARE EDUCATION/TRAINING PROGRAM

## 2018-12-14 RX ADMIN — HEPARIN SODIUM AND DEXTROSE 19 UNITS/KG/HR: 10000; 5 INJECTION INTRAVENOUS at 06:12

## 2018-12-14 RX ADMIN — METRONIDAZOLE 500 MG: 500 TABLET ORAL at 03:12

## 2018-12-14 RX ADMIN — INSULIN ASPART 3 UNITS: 100 INJECTION, SOLUTION INTRAVENOUS; SUBCUTANEOUS at 01:12

## 2018-12-14 RX ADMIN — HEPARIN SODIUM AND DEXTROSE 19 UNITS/KG/HR: 10000; 5 INJECTION INTRAVENOUS at 01:12

## 2018-12-14 RX ADMIN — PRAVASTATIN SODIUM 40 MG: 40 TABLET ORAL at 09:12

## 2018-12-14 RX ADMIN — INSULIN ASPART 1 UNITS: 100 INJECTION, SOLUTION INTRAVENOUS; SUBCUTANEOUS at 10:12

## 2018-12-14 RX ADMIN — METRONIDAZOLE 500 MG: 500 TABLET ORAL at 09:12

## 2018-12-14 RX ADMIN — CEFAZOLIN 2 G: 1 INJECTION, POWDER, FOR SOLUTION INTRAMUSCULAR; INTRAVENOUS at 05:12

## 2018-12-14 RX ADMIN — INSULIN ASPART 2 UNITS: 100 INJECTION, SOLUTION INTRAVENOUS; SUBCUTANEOUS at 01:12

## 2018-12-14 RX ADMIN — GABAPENTIN 100 MG: 100 CAPSULE ORAL at 09:12

## 2018-12-14 RX ADMIN — FUROSEMIDE 40 MG: 40 TABLET ORAL at 09:12

## 2018-12-14 RX ADMIN — METRONIDAZOLE 500 MG: 500 TABLET ORAL at 05:12

## 2018-12-14 RX ADMIN — INSULIN ASPART 2 UNITS: 100 INJECTION, SOLUTION INTRAVENOUS; SUBCUTANEOUS at 09:12

## 2018-12-14 RX ADMIN — CEFAZOLIN 2 G: 1 INJECTION, POWDER, FOR SOLUTION INTRAMUSCULAR; INTRAVENOUS at 06:12

## 2018-12-14 RX ADMIN — INSULIN ASPART 2 UNITS: 100 INJECTION, SOLUTION INTRAVENOUS; SUBCUTANEOUS at 06:12

## 2018-12-14 NOTE — SUBJECTIVE & OBJECTIVE
Interval History: Patient with no acute events , very frustrated about having to stay in the hospital but understands that he needs treatment with IV antibiotics     Review of Systems   Constitutional: Negative for appetite change, chills, diaphoresis, fatigue and fever.   HENT: Negative for congestion, sore throat and trouble swallowing.    Eyes: Negative for photophobia, pain and discharge.   Respiratory: Negative for cough, chest tightness and shortness of breath.    Cardiovascular: Negative for chest pain, palpitations and leg swelling.   Gastrointestinal: Negative for abdominal pain, diarrhea, nausea and vomiting.   Genitourinary: Negative for decreased urine volume, difficulty urinating and urgency.   Musculoskeletal: Positive for gait problem. Negative for back pain, myalgias and neck pain.   Skin: Positive for wound. Negative for pallor and rash.   Neurological: Negative for dizziness, weakness, light-headedness, numbness and headaches.     Objective:     Vital Signs (Most Recent):  Temp: 98 °F (36.7 °C) (12/14/18 1117)  Pulse: 81 (12/14/18 1117)  Resp: 17 (12/14/18 1117)  BP: (!) 172/76 (12/14/18 1117)  SpO2: 97 % (12/14/18 1117) Vital Signs (24h Range):  Temp:  [96.5 °F (35.8 °C)-98.1 °F (36.7 °C)] 98 °F (36.7 °C)  Pulse:  [49-81] 81  Resp:  [15-20] 17  SpO2:  [93 %-97 %] 97 %  BP: (112-172)/(56-76) 172/76     Weight: 104.3 kg (230 lb)  Body mass index is 33.97 kg/m².    Intake/Output Summary (Last 24 hours) at 12/14/2018 1430  Last data filed at 12/13/2018 1800  Gross per 24 hour   Intake 360 ml   Output 250 ml   Net 110 ml      Physical Exam   Constitutional: He is oriented to person, place, and time. He appears well-developed and well-nourished. No distress.   HENT:   Head: Normocephalic and atraumatic.   Mouth/Throat: Uvula is midline, oropharynx is clear and moist and mucous membranes are normal.   Eyes: Conjunctivae are normal. Pupils are equal, round, and reactive to light.   Neck: No JVD present.    Cardiovascular: Normal rate and normal heart sounds. An irregularly irregular rhythm present.   No murmur heard.  Pulses:       Radial pulses are 2+ on the right side, and 2+ on the left side.        Dorsalis pedis pulses are 1+ on the right side, and 2+ on the left side.   Pulmonary/Chest: Effort normal and breath sounds normal. No respiratory distress.   Abdominal: Soft. Normal appearance and bowel sounds are normal. He exhibits no distension. There is no tenderness.   Musculoskeletal:   Extensive 3 cm open wound on right foot near site of 1st toe amputation with exposure of underlying bones. No purulent discharge. Dressing clean, dry, intact.    Neurological: He is alert and oriented to person, place, and time.   Skin: Skin is warm and dry. No bruising and no rash noted.       Significant Labs:   CBC:   Recent Labs   Lab 12/13/18 0311 12/14/18  0602   WBC 8.07 7.95   HGB 10.9* 11.2*   HCT 34.7* 35.6*    219     CMP:   Recent Labs   Lab 12/13/18 0311 12/14/18  0602    139   K 4.9 4.8    101   CO2 25 27   * 187*   BUN 23 23   CREATININE 1.5* 1.6*   CALCIUM 10.0 9.9   ANIONGAP 11 11   EGFRNONAA 43.7* 40.4*       Significant Imaging: I have reviewed all pertinent imaging results/findings within the past 24 hours.  I have reviewed and interpreted all pertinent imaging results/findings within the past 24 hours.

## 2018-12-14 NOTE — PT/OT/SLP PROGRESS
"Physical Therapy Treatment/ Discharge Summary     Patient Name:  Jai Godinez   MRN:  [id number]    Recommendations:     Discharge Recommendations:  ( PT)   Discharge Equipment Recommendations: walker, rolling   Barriers to discharge: None    Assessment:     Jai Godinez is a 78 y.o. male admitted with a medical diagnosis of Amputated great toe of right foot.  He presents with the following impairments/functional limitations:  impaired endurance. During today's session, pt reported incr fustration with current hospitalization. Ambulatory with RW usage safely; did not want to perform stair navigation until discharged to home environment with pt being educated on proper technique. No further need for acute PT services. Would benefit from daily ambulation walks with nsg and wife prior to discharge to home environment.     Rehab Prognosis:  Good; patient would benefit from acute skilled PT services to address these deficits and reach maximum level of function.      Recent Surgery: Procedure(s) (LRB):  Angiogram Extremity Unilateral - L CFA access, RLE angio with CO2 contrast (N/A) 3 Days Post-Op    Plan:     During this hospitalization, patient to be seen 3 x/week to address the above listed problems via gait training, therapeutic activities, therapeutic exercises, neuromuscular re-education  · Plan of Care Expires:  01/09/19   Plan of Care Reviewed with: patient    Subjective     Communicated with nsg prior to session.  Patient found supine in bed upon PT entry to room, agreeable to treatment.      Chief Complaint: being in hospital  Patient comments/goals: "Everyone keeps talking but I don't know what they are doing" per pt during session.   Pain/Comfort:  · Pain Rating 1: 0/10    Patients cultural, spiritual, [employer] conflicts given the current situation:      Objective:     Patient found with: peripheral IV, wound vac, telemetry     General Precautions: Standard, fall   Orthopedic Precautions:LLE weight " bearing as tolerated   Braces: (Darco Shoe)     Functional Mobility  Bed Mobility  Supine to Sit: supervision   Sit to Supine: supervision   Transfers Sit to Stand:  supervision with rolling walker for 2 trials     Gait Gait Distance: 150 ft with RW  Assistance Level: stand by assistance  Description: slowed ray with step to gait pattern. No loss of balance or instability denoted. RW used properly to offset weight.         Balance   Static Sitting supervision   Dynamic Sitting supervision   Static Standing supervision   Dynamic Standing stand by assistance         AM-PAC 6 CLICK MOBILITY  Turning over in bed (including adjusting bedclothes, sheets and blankets)?: 4  Sitting down on and standing up from a chair with arms (e.g., wheelchair, bedside commode, etc.): 4  Moving from lying on back to sitting on the side of the bed?: 4  Moving to and from a bed to a chair (including a wheelchair)?: 4  Need to walk in hospital room?: 3  Climbing 3-5 steps with a railing?: 3  Basic Mobility Total Score: 22       Therapeutic Activities and Exercises:   PT provided therapeutic listening regarding current fustration with hospitalization. Pt voiced concern regarding remaining in hospital, need for RW in hospital, and need for [facility] to expedite process. PT discussed therapist ability to assist with obtaining RW.     PT placed Epic order on Monday. Followed up with [facility] Supervisor with delivery performed on 12/14    Patient left supine with all lines intact and call button in reach..    GOALS:   Multidisciplinary Problems     Physical Therapy Goals     Not on file          Multidisciplinary Problems (Resolved)        Problem: Physical Therapy Goal    Goal Priority Disciplines Outcome Goal Variances Interventions   Physical Therapy Goal   (Resolved)     PT, PT/OT Outcome(s) achieved     Description:  Goals to be met by: 10 days (12/20)    Patient will increase functional independence  with mobility by performin. Supine to sit with Set-up [place]  2. Sit to supine with Stand-by Assistance  3. Sit to stand transfer with Supervision  4. Gait  x 100 feet with Stand-by Assistance using Rolling Walker.   5. Lower extremity exercise program x30 reps per handout, with independence to improve muscular endurance and strength.                      Time Tracking:     PT Received On: 18  PT Start Time: 1100     PT Stop Time: 1125  PT Total Time (min): 25 min     Billable Minutes: Gait Training 15 and Therapeutic Activity 10    Treatment Type: Treatment  PT/PTA: PT           Magui Major, PT  2018

## 2018-12-14 NOTE — PLAN OF CARE
HANG faxed d/c worksheet to HANG Osorio [phone] (fax); [phone] opt.1 Ext [phone] at [facility] to being the patient's d/c process. HANG will continue to follow.      12/14/18 1008   Post-Acute Status   Post-Acute Authorization Placement   Post-Acute Placement Status Pending Medical Review     Millie Mayer LMSW   - [facility]  Ext.[phone]

## 2018-12-14 NOTE — PROGRESS NOTES
"[facility]  Adult Nutrition  Progress Note    SUMMARY       Recommendations    Recommendation/Intervention:   1.Cont w/ Cardiac/ 2000kcal ADA diet.   2.Encourage compliance to ADA diet.     1.RD to follow  Goals: diet compliance w/ controlled Cbg  Nutrition Goal Status: new  Communication of RD Recs: other (comment)(POC)    Reason for Assessment    Reason For Assessment: length of stay  Diagnosis: (Amputated great toe of right foot)  Relevant Medical History: CHF, CAD, DM2, s/p CABG, HTN  Interdisciplinary Rounds: did not attend  General Information Comments: pt states eating well despite the lack of flavor in the food, states not needs concerning DM2 edu/management. note pt s/p toe amputation this admit and currently on BIPAP at time of visit.  no indicators for malnutrition at this time  Nutrition Discharge Planning: Cardiac/ ADA 2000 kcal diet, w/ compliance to consistent CHO intake    Nutrition Risk Screen    Nutrition Risk Screen: no indicators present    Nutrition/Diet History    Patient Reported Diet/Restrictions/Preferences: diabetic diet  Food Preferences: no  Spiritual, Cultural Beliefs, Nondenominational Practices, Values that Affect Care: no  Food Allergies: NKFA  Factors Affecting Nutritional Intake: None identified at this time    Anthropometrics    Temp: 98 °F (36.7 °C)  Height: 5' 9" (175.3 cm)  Height (inches): 69 in  Weight Method: Bed Scale  Weight: 104.3 kg (230 lb)  Weight (lb): 230 lb  Ideal Body Weight (IBW), Male: 160 lb  % Ideal Body Weight, Male (lb): 138.69 lb  BMI (Calculated): 32.8       Lab/Procedures/Meds    Pertinent Labs Reviewed: reviewed  Pertinent Labs Comments: Glu-187, Crt-1.6, GFR-40.4, CRP-93.4  Pertinent Medications Reviewed: reviewed  Pertinent Medications Comments: Lasix, Heparin, insulin, statin    Nutrition Prescription Ordered    Cardiac/ ADA 2000kcal    Evaluation of Received Nutrient/Fluid Intake    Energy Calories Required: meeting needs  Protein Required: " meeting needs  Fluid Required: meeting needs  Comments: LBM: 12/13/18  % Intake of Estimated Energy Needs: 75 - 100 %  % Meal Intake: 75 - 100 %    Nutrition Risk    Level of Risk/Frequency of Follow-up: low     Assessment and Plan    Nutrition Problem  Altered nutritional related lab values (renal)    Related to (etiology):   stg III CKD r/t DM2    Signs and Symptoms (as evidenced by):   Glu-187, Crt-1.6, GFR-40.4    Interventions/Recommendations (treatment strategy):  1.Cont w/ Cardiac/ 2000kcal ADA diet.   2.Encourage compliance to ADA diet.     1.RD to follow    Nutrition Diagnosis Status:   New        Monitor and Evaluation    Food and Nutrient Intake: energy intake, food and beverage intake  Food and Nutrient Adminstration: diet order  Anthropometric Measurements: weight, weight change  Biochemical Data, Medical Tests and Procedures: (all labs)  Nutrition-Focused Physical Findings: overall appearance, extremities, muscles and bones     Malnutrition Assessment  Malnutrition Type: (not indicated at this time)          Nutrition Follow-Up    RD Follow-up?: Yes

## 2018-12-14 NOTE — ASSESSMENT & PLAN NOTE
· Status post amputation of first toe of right foot on 12/03 at [facility] secondary to either severe PAD with possible super-imposed osteomyelitis.   · [facility] course was complicated by development non-pulsatile blood flow after, as a result podiatry has left wound open. Vascular at [facility] wanted to due angioplasty post-op, but CO2 angiography wasn't available, prompting transfer here. See vascular surgery and podiatry note for images of wound.   · Case discussed with Dr. Gomez in Vascular Surgery here at transfer. Vascular surgery evaluated; recommended repeating DANILO's and tentatively planning for angiogram. DANILO's ordered; only showing mild PAD. Angiogram on 12/11 concerning for no significant blood flow with collaterals below the right ankle; recommended hyperbaric O2 as treatment modality, but not likely option given patient's insurance status. Podiatry re-evaluated; planning on outpatient debridement at [facility] with attempted treatment with IV antibiotics and wound vac, before possible amputation.    · Exam notable for diminished pulses in right foot and lower extremity. .   · Continuing heparin drip at low intensity for anticoagulation with new Afib.   · Wound care consulted; deferred care recommendations to podiatry.   · Tylenol PRN changed to Gabapentin 100 mg TID with improved control.   · PT/OT ordered.

## 2018-12-14 NOTE — PLAN OF CARE
Problem: Patient Care Overview  Goal: Plan of Care Review  Outcome: Ongoing (interventions implemented as appropriate)  Patient alert and oriented and verbalizes needs. Moves all extremities well. Has PICC line and receiving heparin via line. Tolerating well. Therapeutic level noted. No fall or occurrences noted. Wife assist with care . Blood sugars monitored and insulin per protocol and md orders. Tolerating iv antibiotics well. On gabapentin for pain scheduled . Has Tylenol prn.  No complaints noted. Dressing intact to right foot. Wound vac at 75. Assessment on going.

## 2018-12-14 NOTE — ASSESSMENT & PLAN NOTE
· Concern for possible right foot osteomyelitis with wound cultures growing MSSA and Group G Streptococcus; blood cultures negative; ID there treated with Ceftriaxone while in-patient, with plans to discharge on Doxycycline. [facility].   · Status post debridement by podiatry at [facility] prior to transfer.   · Unclear if patient had imaging studies suggesting or confirming.   · Afebrile. No leukocytosis. ESR 75. CRP 93.    · Blood cultures ordered.   · ID consulted for recommendations regarding correct choice of antibiotic at [facility] and anticipated stop date of course. Status post one day of Ceftriaxone. Day #8 of Cefazolin per ID recs; ID anticipates 6 weeks of IV ABX as with exposed bone. PICC line placed   · Podiatry consulted; wound cultures ordered. Wound to be dressed with Betadyne and Kerflex for now. Recommending wound vac at discharge.   · Podiatry evaluated; plan is for outpatient debridement at [facility] possibly next week.     ID discharge recommendations are as follows:   1.  Continue cefazolin 2 grams IV Q12 hours (renally dosed.  Discussed with ID PharmD).  2.  Add flagyl 500 mg orally Q8 hours for anaerobic coverage   3.  Anticipate 6 weeks of IV abx given with exposed bone.  Estimated end date 1/18/19  4.  Weekly cbc, cmp, crp, esr.  Until re-established with [facility] ID, fax results to ID at [phone]  5.  Will need ID follow up at [facility]

## 2018-12-14 NOTE — PROGRESS NOTES
[facility] Hospital Medicine  Progress Note    Patient Name: Jai Godinez  MRN: [id number]  Patient Class: IP- Inpatient   Admission Date: 12/6/2018  Length of Stay: 8 days  Attending Physician: Citlali Bazan MD  Primary Care Provider: Pepe Valverde MD    [facility] Medicine Team: [facility] HOSP MED 5 Nichelle Leonard MD    Subjective:     Principal Problem:Amputated great toe of right foot    HPI:  This is a 78 year old female transferred from the [facility] for vascular surgery repair of right great toe status post amputation. He has a PMH of quadruple bypass (2001), severe peripheral artery disease, HFrEF (EF 45-50%), T2DM (A1c 8.1), HTN, SANDIE (on CPAP nightly) and diverticulosis.     He was hospitalized in [place] from 11/14 to 11/21 for acute on chronic heart failure exacerbation; discharge home with resolution of symptoms. He then developed one day of progressively worsening swelling of the right great toe associated with erythematous skin changes; for relief, he cut the skin of the toe with his pocket knife, at which point noticed purulent discharge, [facility]     Patient underwent right toe amputation done on 12/3 for pre-operative DANILO indicative of severe PAD. His post-operative course was complicated by development non-pulsatile blood flow after, as a result podiatry has left wound open. Vascular  at [facility] wanted to due angioplasty post-op, but CO2 angiography wasn't available. Also, concern for possible right foot osteomyelitis with wound cultures growing MSSA and Group G Streptococcus; blood cultures negative; ID there treated with Ceftriaxone while in-patient, with plans to discharge on Doxycycline.  In addition, on presentation, he was noted to have new development of Afib with slow ventricular response; rate control with Metoprolol and started on heparin drip while inpatient; transitioned to Dabigatran prior to transfer.     Patient/family requested transfer elsewhere to  accomplish procedure. [facility] center contacted today and case was discussed with Dr. Patricia Sweeney,  who requested patient to transfer to hospital medicine with vascular surgery consults.        On arrival here, he reports episodic cramping pain isolated to the amputation site occurring approximately Q5min. Pain relieved by taking Tylenol PRN. Denies fevers, chills, chest pain, SOB, palpitations, numbness of extremities.         Hospital Course:  12/07: No acute events overnight. Vascular surgery assessed this morning; recommended repeat DANILO and tentative angiogram today. Wound care and podiatry consulted for open wound and right foot osteomyelitis.  Wound care defering to podiatry. Podiatry repeating cultures and XR of foot. ID consulted for antibiotic recs; Ceftriaxone changed to Cefazolin (day #1). LR maintenance started to attempt optimization of kidney function; d/c home Lisinopril. [facility].   12/08: No events overnight. DANILO showing mild PAD. Scheduled for angiogram 12/12. BUN and Cr improved to 29 and 1.4 with maintenance LR; GFR 47. ESR 93, CRP 75. Day #2 of Cefazolin.   12/09: No events overnight. Day #3 of Cefazolin. Renal function same. Tylenol PRN changed to Gabapentin with pain improvement.   12/10: No events overnight. Day #4 of Cefazolin. Renal function same. Evaluated by Vascular; angiogram moved up to tomorrow. NPO at midnight.   12/11: No events overnight. Angiogram scheduled today. Day #5 of Cefazolin.   12/12: Angiogram yesterday concerning for no significant blood flow with collaterals below the right ankle. Vascular recommended hyperbaric oxygen as possible treatment modality, but not likely option given patient's insurance status. Discussed case with podiatry; will speak to vascular and discuss blood flow to area for possible amputation. No events overnight. Day #6 of Cefazolin.   12/13: No events overnight. Podiatry evaluated; planning for [facility]  [facility], with attempted treatment with IV antibiotics and wound vac, before amputation. PICC line placed today. Home health orders placed for attempted insurance coverage with [facility]; still waiting on answer. Day #7 of Cefazolin; per ID final recommendations will anticipate 6 week course of IV Cefazolin 2 g Q12H with Flagyl 500 mg Q8H with anticipated end date of 01/18/19 based on culture results from [facility].     Interval History: Patient with no acute events , very frustrated about having to stay in the hospital but understands that he needs treatment with IV antibiotics     Review of Systems   Constitutional: Negative for appetite change, chills, diaphoresis, fatigue and fever.   HENT: Negative for congestion, sore throat and trouble swallowing.    Eyes: Negative for photophobia, pain and discharge.   Respiratory: Negative for cough, chest tightness and shortness of breath.    Cardiovascular: Negative for chest pain, palpitations and leg swelling.   Gastrointestinal: Negative for abdominal pain, diarrhea, nausea and vomiting.   Genitourinary: Negative for decreased urine volume, difficulty urinating and urgency.   Musculoskeletal: Positive for gait problem. Negative for back pain, myalgias and neck pain.   Skin: Positive for wound. Negative for pallor and rash.   Neurological: Negative for dizziness, weakness, light-headedness, numbness and headaches.     Objective:     Vital Signs (Most Recent):  Temp: 98 °F (36.7 °C) (12/14/18 1117)  Pulse: 81 (12/14/18 1117)  Resp: 17 (12/14/18 1117)  BP: (!) 172/76 (12/14/18 1117)  SpO2: 97 % (12/14/18 1117) Vital Signs (24h Range):  Temp:  [96.5 °F (35.8 °C)-98.1 °F (36.7 °C)] 98 °F (36.7 °C)  Pulse:  [49-81] 81  Resp:  [15-20] 17  SpO2:  [93 %-97 %] 97 %  BP: (112-172)/(56-76) 172/76     Weight: 104.3 kg (230 lb)  Body mass index is 33.97 kg/m².    Intake/Output Summary (Last 24 hours) at 12/14/2018 1430  Last data filed at 12/13/2018 1800  Gross per 24  hour   Intake 360 ml   Output 250 ml   Net 110 ml      Physical Exam   Constitutional: He is oriented to person, place, and time. He appears well-developed and well-nourished. No distress.   HENT:   Head: Normocephalic and atraumatic.   Mouth/Throat: Uvula is midline, oropharynx is clear and moist and mucous membranes are normal.   Eyes: Conjunctivae are normal. Pupils are equal, round, and reactive to light.   Neck: No JVD present.   Cardiovascular: Normal rate and normal heart sounds. An irregularly irregular rhythm present.   No murmur heard.  Pulses:       Radial pulses are 2+ on the right side, and 2+ on the left side.        Dorsalis pedis pulses are 1+ on the right side, and 2+ on the left side.   Pulmonary/Chest: Effort normal and breath sounds normal. No respiratory distress.   Abdominal: Soft. Normal appearance and bowel sounds are normal. He exhibits no distension. There is no tenderness.   Musculoskeletal:   Extensive 3 cm open wound on right foot near site of 1st toe amputation with exposure of underlying bones. No purulent discharge. Dressing clean, dry, intact.    Neurological: He is alert and oriented to person, place, and time.   Skin: Skin is warm and dry. No bruising and no rash noted.       Significant Labs:   CBC:   Recent Labs   Lab 12/13/18  0311 12/14/18  0602   WBC 8.07 7.95   HGB 10.9* 11.2*   HCT 34.7* 35.6*    219     CMP:   Recent Labs   Lab 12/13/18  0311 12/14/18  0602    139   K 4.9 4.8    101   CO2 25 27   * 187*   BUN 23 23   CREATININE 1.5* 1.6*   CALCIUM 10.0 9.9   ANIONGAP 11 11   EGFRNONAA 43.7* 40.4*       Significant Imaging: I have reviewed all pertinent imaging results/findings within the past 24 hours.  I have reviewed and interpreted all pertinent imaging results/findings within the past 24 hours.    Assessment/Plan:      * Amputated great toe of right foot    · Status post amputation of first toe of right foot on 12/03 at [facility] secondary to either  severe PAD with possible super-imposed osteomyelitis.   · [facility] that [facility] course was complicated by development non-pulsatile blood flow after, as a result podiatry has left wound open. Vascular at [facility] wanted to due angioplasty post-op, but CO2 angiography wasn't available, prompting transfer here. See vascular surgery and podiatry note for images of wound.   · Case discussed with Dr. Gomez in Vascular Surgery here at transfer. Vascular surgery evaluated; recommended repeating DANILO's and tentatively planning for angiogram. DANILO's ordered; only showing mild PAD. Angiogram on 12/11 concerning for no significant blood flow with collaterals below the right ankle; recommended hyperbaric O2 as treatment modality, but not likely option given patient's insurance status. Podiatry re-evaluated; planning on outpatient debridement at [facility] with attempted treatment with IV antibiotics and wound vac, before possible amputation.    · Exam notable for diminished pulses in right foot and lower extremity. .   · Continuing heparin drip at low intensity for anticoagulation with new Afib.   · Wound care consulted; deferred care recommendations to podiatry.   · Tylenol PRN changed to Gabapentin 100 mg TID with improved control.   · PT/OT ordered.        Open wound of right foot    See assessment for right great toe amputation and right foot osteomyelitis.        Stage 3 chronic kidney disease    · Improved.   · Unclear of baseline; no outside records available. May prevent patient from receiving regular angiogram vs with CO2.   · Could be acute, but patient with history of poorly controlled T2DM.   · BUN 37, Cr 1.6, and GFR 40 on admission.    · Holding home Lisinopril in setting of possible STACY.   · BMP daily.           Osteomyelitis of right foot    · Concern for possible right foot osteomyelitis with wound cultures growing MSSA and Group G Streptococcus; blood cultures negative; ID there treated with Ceftriaxone while  in-patient, with plans to discharge on Doxycycline. [facility].   · Status post debridement by podiatry at [facility] prior to transfer.   · Unclear if patient had imaging studies suggesting or confirming.   · Afebrile. No leukocytosis. ESR 75. CRP 93.    · Blood cultures ordered.   · ID consulted for recommendations regarding correct choice of antibiotic at [facility] and anticipated stop date of course. Status post one day of Ceftriaxone. Day #8 of Cefazolin per ID recs; ID anticipates 6 weeks of IV ABX as with exposed bone. PICC line placed   · Podiatry consulted; wound cultures ordered. Wound to be dressed with Betadyne and Kerflex for now. Recommending wound vac at discharge.   · Podiatry evaluated; plan is for outpatient debridement at [facility] possibly next week.     ID discharge recommendations are as follows:   1.  Continue cefazolin 2 grams IV Q12 hours (renally dosed.  Discussed with ID PharmD).  2.  Add flagyl 500 mg orally Q8 hours for anaerobic coverage   3.  Anticipate 6 weeks of IV abx given with exposed bone.  Estimated end date 1/18/19  4.  Weekly cbc, cmp, crp, esr.  Until re-established with [facility] ID, fax results to ID at [phone]  5.  Will need ID follow up at [facility]       Atrial fibrillation with slow ventricular response    · Irregular irregular rhythm on exam with HR controlled in lower 60's.   · May restart home Metoprolol at 12.5 mg with parameters to hold for HR <65.   · Started on heparin infusion at [facility] with plans to transition with Dabigatran.   · INR 1.2 on admission.   · Started heparin infusion here with low intensity and following aPTT. APTT 38 today.  · [facility] records mention plan to discharge patient on Dabigatran based on approved coverage. Will continue heparin infusion until discharge recommendations complete from consults.    · Will need follow-up outpatient with cardiology at [facility].        Chronic systolic heart failure    · Last EF 40-50% at [facility] per transfer note.    · No signs of fluid overload on exam.   · Managed with Lasix 40 mg BID, Lisinopril 40 mg, and Metoprolol daily.   · Holding Metoprolol temporarily with HR on admission in lower 60's and Lisinopril with possible STACY.  Lisinopril should be changed to ARB on discharge given history of chronic dry cough and worsening systemic rash after Lisinopril dose was increased some months ago.  · Will order cardiac and diabetic diet.         Essential hypertension    · Controlled.   · Resuming Lasix, and holding Metoprolol with HR in lower 60's.   · Discontinued home Lisinopril because of concern for possible acute on chronic STACY, but patient and wife reports history of dry cough and worsening systemic erythematous rash after Lisinopril dose was increased some months ago. Will enter as allergy in patient's chart.   · Will likely discharge on ARB instead of ACE-I.        Type 2 diabetes mellitus, without long-term current use of insulin    · Last A1c 8 at outside facility.   · Holding home Metformin and Glipizide  · Low dose sliding scale ordered on admission.        Peripheral arterial disease    See assessment for amputation of great right toe.          VTE Risk Mitigation (From admission, onward)        Ordered     heparin 25,000 units in dextrose 5% 250 mL (100 units/mL) infusion LOW INTENSITY nomogram - [facility]  Continuous      12/11/18 1710     heparin 25,000 units in dextrose 5% (100 units/ml) IV bolus from bag - ADDITIONAL PRN BOLUS - 60 units/kg  As needed (PRN)      12/11/18 1710     heparin 25,000 units in dextrose 5% (100 units/ml) IV bolus from bag - ADDITIONAL PRN BOLUS - 30 units/kg  As needed (PRN)      12/11/18 1710     heparin, porcine (PF) 100 unit/mL injection flush 10 Units  As needed (PRN)      12/06/18 2119     IP VTE HIGH RISK PATIENT  Once      12/06/18 1859        Patient seen and examined this morning. Discussed with Dr. Bazan - staff attestation to follow.          Nichelle Leonard MD  Department of  Hospital Medicine [facility]

## 2018-12-14 NOTE — PLAN OF CARE
Problem: Patient Care Overview  Goal: Plan of Care Review  Outcome: Ongoing (interventions implemented as appropriate)  Patient alert and oriented. Verbalizes needs. Turns and positions self , ambulatory to and from bathroom with assist due to wound vac and iv. Wife at bedside and assist with care. Blood sugars monitored. Insulin per orders. No fall or occurrence noted. Tolerated iv antibiotics well. Assessment on going.

## 2018-12-14 NOTE — PLAN OF CARE
Problem: Physical Therapy Goal  Goal: Physical Therapy Goal  Goals to be met by: 10 days ()    Patient will increase functional independence with mobility by performin. Supine to sit with Set-up [place]  2. Sit to supine with Stand-by Assistance  3. Sit to stand transfer with Supervision  4. Gait  x 100 feet with Stand-by Assistance using Rolling Walker.   5. Lower extremity exercise program x30 reps per handout, with independence to improve muscular endurance and strength.     Outcome: Outcome(s) achieved Date Met: 18  All goals met; pt discharged from acute PT services.   PT and RW needed upon discharge.    Magui Major, PT  2018

## 2018-12-14 NOTE — ASSESSMENT & PLAN NOTE
· Improved.   · Unclear of baseline; no outside records available. May prevent patient from receiving regular angiogram vs with CO2.   · Could be acute, but patient with history of poorly controlled T2DM.   · BUN 37, Cr 1.6, and GFR 40 on admission.    · Holding home Lisinopril in setting of possible STACY.   · BMP daily.

## 2018-12-14 NOTE — ASSESSMENT & PLAN NOTE
· Irregular irregular rhythm on exam with HR controlled in lower 60's.   · May restart home Metoprolol at 12.5 mg with parameters to hold for HR <65.   · Started on heparin infusion at [facility] with plans to transition with Dabigatran.   · INR 1.2 on admission.   · Started heparin infusion here with low intensity and following aPTT. APTT 38 today.  · [facility] records mention plan to discharge patient on Dabigatran based on approved coverage. Will continue heparin infusion until discharge recommendations complete from consults.    · Will need follow-up outpatient with cardiology at [facility].

## 2018-12-14 NOTE — PLAN OF CARE
Problem: Patient Care Overview  Goal: Plan of Care Review    Recommendations     Recommendation/Intervention:   1.Cont w/ Cardiac/ 2000kcal ADA diet.   2.Encourage compliance to ADA diet.      1.RD to follow  Goals: diet compliance w/ controlled Cbg  Nutrition Goal Status: new  Communication of RD Recs: other (comment)(POC)     Reason for Assessment     Reason For Assessment: length of stay  Diagnosis: (Amputated great toe of right foot)  Relevant Medical History: CHF, CAD, DM2, s/p CABG, HTN  Interdisciplinary Rounds: did not attend  General Information Comments: pt states eating well despite the lack of flavor in the food, states not needs concerning DM2 edu/management. note pt s/p toe amputation this admit and currently on BIPAP at time of visit.  no indicators for malnutrition at this time  Nutrition Discharge Planning: Cardiac/ ADA 2000 kcal diet, w/ compliance to consistent CHO intake

## 2018-12-15 LAB
ANION GAP SERPL CALC-SCNC: 12 MMOL/L
APTT BLDCRRT: 36.8 SEC
BASOPHILS # BLD AUTO: 0.07 K/UL
BASOPHILS NFR BLD: 0.9 %
BUN SERPL-MCNC: 21 MG/DL
CALCIUM SERPL-MCNC: 10.3 MG/DL
CHLORIDE SERPL-SCNC: 101 MMOL/L
CO2 SERPL-SCNC: 23 MMOL/L
CREAT SERPL-MCNC: 1.4 MG/DL
DIFFERENTIAL METHOD: ABNORMAL
EOSINOPHIL # BLD AUTO: 0.4 K/UL
EOSINOPHIL NFR BLD: 5.4 %
ERYTHROCYTE [DISTWIDTH] IN BLOOD BY AUTOMATED COUNT: 14.4 %
EST. GFR  (AFRICAN AMERICAN): 54.9 ML/MIN/1.73 M^2
EST. GFR  (NON AFRICAN AMERICAN): 47.4 ML/MIN/1.73 M^2
GLUCOSE SERPL-MCNC: 196 MG/DL
HCT VFR BLD AUTO: 35.7 %
HGB BLD-MCNC: 11.2 G/DL
IMM GRANULOCYTES # BLD AUTO: 0.03 K/UL
IMM GRANULOCYTES NFR BLD AUTO: 0.4 %
LYMPHOCYTES # BLD AUTO: 1.6 K/UL
LYMPHOCYTES NFR BLD: 19.8 %
MCH RBC QN AUTO: 29.3 PG
MCHC RBC AUTO-ENTMCNC: 31.4 G/DL
MCV RBC AUTO: 94 FL
MONOCYTES # BLD AUTO: 0.9 K/UL
MONOCYTES NFR BLD: 10.9 %
NEUTROPHILS # BLD AUTO: 4.9 K/UL
NEUTROPHILS NFR BLD: 62.6 %
NRBC BLD-RTO: 0 /100 WBC
PLATELET # BLD AUTO: 224 K/UL
PMV BLD AUTO: 10.9 FL
POCT GLUCOSE: 198 MG/DL (ref 70–110)
POCT GLUCOSE: 219 MG/DL (ref 70–110)
POCT GLUCOSE: 220 MG/DL (ref 70–110)
POCT GLUCOSE: 229 MG/DL (ref 70–110)
POTASSIUM SERPL-SCNC: 4.4 MMOL/L
RBC # BLD AUTO: 3.82 M/UL
SODIUM SERPL-SCNC: 136 MMOL/L
WBC # BLD AUTO: 7.82 K/UL

## 2018-12-15 PROCEDURE — 63600175 PHARM REV CODE 636 W HCPCS: Performed by: NURSE PRACTITIONER

## 2018-12-15 PROCEDURE — 99900035 HC TECH TIME PER 15 MIN (STAT)

## 2018-12-15 PROCEDURE — S5571 INSULIN DISPOS PEN 3 ML: HCPCS | Performed by: HOSPITALIST

## 2018-12-15 PROCEDURE — 85730 THROMBOPLASTIN TIME PARTIAL: CPT

## 2018-12-15 PROCEDURE — 94761 N-INVAS EAR/PLS OXIMETRY MLT: CPT

## 2018-12-15 PROCEDURE — 25000003 PHARM REV CODE 250: Performed by: STUDENT IN AN ORGANIZED HEALTH CARE EDUCATION/TRAINING PROGRAM

## 2018-12-15 PROCEDURE — 85025 COMPLETE CBC W/AUTO DIFF WBC: CPT

## 2018-12-15 PROCEDURE — 94660 CPAP INITIATION&MGMT: CPT

## 2018-12-15 PROCEDURE — 99232 SBSQ HOSP IP/OBS MODERATE 35: CPT | Mod: GC,,, | Performed by: HOSPITALIST

## 2018-12-15 PROCEDURE — 80048 BASIC METABOLIC PNL TOTAL CA: CPT

## 2018-12-15 PROCEDURE — 25000003 PHARM REV CODE 250: Performed by: NURSE PRACTITIONER

## 2018-12-15 PROCEDURE — 11000001 HC ACUTE MED/SURG PRIVATE ROOM

## 2018-12-15 PROCEDURE — 63600175 PHARM REV CODE 636 W HCPCS: Performed by: HOSPITALIST

## 2018-12-15 PROCEDURE — 36415 COLL VENOUS BLD VENIPUNCTURE: CPT

## 2018-12-15 PROCEDURE — 63600175 PHARM REV CODE 636 W HCPCS: Performed by: STUDENT IN AN ORGANIZED HEALTH CARE EDUCATION/TRAINING PROGRAM

## 2018-12-15 RX ORDER — DABIGATRAN ETEXILATE 150 MG/1
150 CAPSULE ORAL 2 TIMES DAILY
Status: DISCONTINUED | OUTPATIENT
Start: 2018-12-15 | End: 2018-12-18 | Stop reason: HOSPADM

## 2018-12-15 RX ORDER — INSULIN ASPART 100 [IU]/ML
4 INJECTION, SOLUTION INTRAVENOUS; SUBCUTANEOUS
Status: DISCONTINUED | OUTPATIENT
Start: 2018-12-15 | End: 2018-12-18 | Stop reason: HOSPADM

## 2018-12-15 RX ADMIN — DABIGATRAN ETEXILATE MESYLATE 150 MG: 150 CAPSULE ORAL at 11:12

## 2018-12-15 RX ADMIN — ACETAMINOPHEN 1000 MG: 500 TABLET, FILM COATED ORAL at 04:12

## 2018-12-15 RX ADMIN — METRONIDAZOLE 500 MG: 500 TABLET ORAL at 01:12

## 2018-12-15 RX ADMIN — INSULIN ASPART 4 UNITS: 100 INJECTION, SOLUTION INTRAVENOUS; SUBCUTANEOUS at 05:12

## 2018-12-15 RX ADMIN — FUROSEMIDE 40 MG: 40 TABLET ORAL at 09:12

## 2018-12-15 RX ADMIN — METRONIDAZOLE 500 MG: 500 TABLET ORAL at 09:12

## 2018-12-15 RX ADMIN — METRONIDAZOLE 500 MG: 500 TABLET ORAL at 05:12

## 2018-12-15 RX ADMIN — ACETAMINOPHEN 1000 MG: 500 TABLET, FILM COATED ORAL at 09:12

## 2018-12-15 RX ADMIN — PRAVASTATIN SODIUM 40 MG: 40 TABLET ORAL at 09:12

## 2018-12-15 RX ADMIN — HEPARIN SODIUM AND DEXTROSE 21 UNITS/KG/HR: 10000; 5 INJECTION INTRAVENOUS at 08:12

## 2018-12-15 RX ADMIN — CEFAZOLIN 2 G: 1 INJECTION, POWDER, FOR SOLUTION INTRAMUSCULAR; INTRAVENOUS at 06:12

## 2018-12-15 RX ADMIN — INSULIN ASPART 2 UNITS: 100 INJECTION, SOLUTION INTRAVENOUS; SUBCUTANEOUS at 05:12

## 2018-12-15 RX ADMIN — INSULIN ASPART 4 UNITS: 100 INJECTION, SOLUTION INTRAVENOUS; SUBCUTANEOUS at 12:12

## 2018-12-15 RX ADMIN — INSULIN DETEMIR 5 UNITS: 100 INJECTION, SOLUTION SUBCUTANEOUS at 10:12

## 2018-12-15 RX ADMIN — DABIGATRAN ETEXILATE MESYLATE 150 MG: 150 CAPSULE ORAL at 09:12

## 2018-12-15 RX ADMIN — GABAPENTIN 100 MG: 100 CAPSULE ORAL at 09:12

## 2018-12-15 RX ADMIN — INSULIN ASPART 2 UNITS: 100 INJECTION, SOLUTION INTRAVENOUS; SUBCUTANEOUS at 12:12

## 2018-12-15 RX ADMIN — INSULIN ASPART 2 UNITS: 100 INJECTION, SOLUTION INTRAVENOUS; SUBCUTANEOUS at 08:12

## 2018-12-15 RX ADMIN — CEFAZOLIN 2 G: 1 INJECTION, POWDER, FOR SOLUTION INTRAMUSCULAR; INTRAVENOUS at 05:12

## 2018-12-15 NOTE — ASSESSMENT & PLAN NOTE
· Last A1c 8 at outside facility.   · Holding home Metformin and Glipizide.  · Added Aspart and Detemir for basal and prandial coverage.   · Low dose sliding scale ordered on admission.

## 2018-12-15 NOTE — ASSESSMENT & PLAN NOTE
· Improved.   · Unclear of baseline; no outside records available. Main hinderance preventing patient from receiving regular angiogram vs with CO2.   · Could be acute, but patient with history of poorly controlled T2DM.   · BUN 37, Cr 1.6, and GFR 40 on admission.    · Held home Lisinopril in setting of possible STACY, and discontinued secondary to side effects.   · BMP Q48H.

## 2018-12-15 NOTE — ASSESSMENT & PLAN NOTE
· Concern for possible right foot osteomyelitis with wound cultures growing MSSA and Group G Streptococcus; blood cultures negative; ID there treated with Ceftriaxone while in-patient, with plans to discharge on Doxycycline. [facility].   · Status post debridement by podiatry at [facility] prior to transfer.   · Unclear if patient had imaging studies suggesting or confirming.   · Afebrile. No leukocytosis. ESR 75. CRP 93.    · Blood cultures ordered.   · ID consulted for recommendations regarding correct choice of antibiotic at [facility] and anticipated stop date of course. Status post one day of Ceftriaxone. Day #9 of Cefazolin per ID recs; ID anticipates 6 weeks of IV ABX as with exposed bone. PICC line placed.  · Podiatry consulted; wound cultures ordered. Wound to be dressed with Betadyne and Kerflex for now. Recommending wound vac at discharge.   · Podiatry evaluated; plan is for outpatient debridement at [facility] possibly next week. Wife reports [facility] notified her that podiatry follow-up will be outsourced according to insurance provider.     ID discharge recommendations are as follows:   1.  Continue cefazolin 2 grams IV Q12 hours (renally dosed.  Discussed with ID PharmD).  2.  Add flagyl 500 mg orally Q8 hours for anaerobic coverage   3.  Anticipate 6 weeks of IV abx given with exposed bone.  Estimated end date 1/18/19  4.  Weekly cbc, cmp, crp, esr.  Until re-established with [facility] ID, fax results to ID at [phone]  5.  Will need ID follow up at [facility]

## 2018-12-15 NOTE — ASSESSMENT & PLAN NOTE
· Status post amputation of first toe of right foot on 12/03 at [facility] secondary to either severe PAD with possible super-imposed osteomyelitis.   · [facility] that post-[facility] course course was complicated by development non-pulsatile blood flow after, as a result podiatry has left wound open. Vascular at [facility] wanted to due angioplasty post-op, but CO2 angiography wasn't available, prompting transfer here. See vascular surgery and podiatry note for images of wound.   · Case discussed with Dr. Gomez in Vascular Surgery here at transfer. Vascular surgery evaluated; recommended repeating DANILO's and tentatively planning for angiogram. DANILO's ordered; only showing mild PAD. Angiogram on 12/11 concerning for no significant blood flow with collaterals below the right ankle; recommended hyperbaric O2 as treatment modality, but not likely option given patient's insurance status. Podiatry re-evaluated; planning on outpatient debridement at [facility] with attempted treatment with IV antibiotics and wound vac, before possible amputation.    · Exam notable for diminished pulses in right foot and lower extremity. .   · Continuing heparin drip at low intensity for anticoagulation with new Afib.   · Wound care consulted; deferred care recommendations to podiatry.   · Tylenol PRN changed to Gabapentin 100 mg TID with improved control.   · PT/OT ordered.

## 2018-12-15 NOTE — ASSESSMENT & PLAN NOTE
· Last EF 40-50% at [facility] per transfer note.   · No signs of fluid overload on exam.   · Managed with Lasix 40 mg BID, Lisinopril 40 mg, and Metoprolol daily.   · Holding Metoprolol temporarily with HR on admission in lower 60's and Lisinopril with possible STACY.  Lisinopril should be changed to ARB on discharge given history of chronic dry cough and worsening systemic rash after Lisinopril dose was increased some months ago.  · Will order cardiac and diabetic diet.

## 2018-12-15 NOTE — SUBJECTIVE & OBJECTIVE
"Interval History:  Patient and wife seen at bedside this AM. He again has new complaints. Pain remains controlled. He is participating in PT, tolerating PO, having BM, and urinating normally. Updated family regarding pending approval from [facility] for IV antibiotics at home. Wife updated me that he now has outpatient follow-up at [facility] with cardiology on 12/21/2018. Also, informed me that [facility] would be "outsourcing" his podiatry follow-up.     Review of Systems   Constitutional: Negative for appetite change, chills, diaphoresis, fatigue and fever.   HENT: Negative for congestion, sore throat and trouble swallowing.    Eyes: Negative for photophobia, pain and discharge.   Respiratory: Negative for cough, chest tightness and shortness of breath.    Cardiovascular: Negative for chest pain, palpitations and leg swelling.   Gastrointestinal: Negative for abdominal pain, diarrhea, nausea and vomiting.   Genitourinary: Negative for decreased urine volume, difficulty urinating and urgency.   Musculoskeletal: Positive for gait problem. Negative for back pain, myalgias and neck pain.   Skin: Positive for wound. Negative for pallor and rash.   Neurological: Negative for dizziness, weakness, light-headedness, numbness and headaches.     Objective:     Vital Signs (Most Recent):  Temp: 98.5 °F (36.9 °C) (12/15/18 0800)  Pulse: 65 (12/15/18 0800)  Resp: 18 (12/15/18 0800)  BP: (!) 113/57 (12/15/18 0800)  SpO2: 96 % (12/15/18 0800) Vital Signs (24h Range):  Temp:  [96.5 °F (35.8 °C)-98.5 °F (36.9 °C)] 98.5 °F (36.9 °C)  Pulse:  [58-81] 65  Resp:  [15-18] 18  SpO2:  [93 %-99 %] 96 %  BP: (113-172)/(57-76) 113/57     Weight: 104.3 kg (230 lb)  Body mass index is 33.97 kg/m².    Intake/Output Summary (Last 24 hours) at 12/15/2018 0819  Last data filed at 12/14/2018 1825  Gross per 24 hour   Intake 1080 ml   Output --   Net 1080 ml      Physical Exam   Constitutional: He is oriented to person, place, and time. He appears well-developed and " well-nourished. No distress.   HENT:   Head: Normocephalic and atraumatic.   Mouth/Throat: Uvula is midline, oropharynx is clear and moist and mucous membranes are normal.   Eyes: Conjunctivae are normal. Pupils are equal, round, and reactive to light.   Neck: No JVD present.   Cardiovascular: Normal rate and normal heart sounds. An irregularly irregular rhythm present.   No murmur heard.  Pulses:       Radial pulses are 2+ on the right side, and 2+ on the left side.        Dorsalis pedis pulses are 1+ on the right side, and 2+ on the left side.   Well-healed sternotomy scar in midline.    Pulmonary/Chest: Effort normal and breath sounds normal. No respiratory distress.   Abdominal: Soft. Normal appearance and bowel sounds are normal. He exhibits no distension. There is no tenderness.   Musculoskeletal:   Extensive 3 cm open wound on right foot near site of 1st toe amputation with exposure of underlying bones. No purulent discharge. Dressing clean, dry, intact.    Neurological: He is alert and oriented to person, place, and time.   Skin: Skin is warm and dry. No bruising and no rash noted.       Significant Labs:   CBC:   Recent Labs   Lab 12/14/18  0602 12/15/18  0443   WBC 7.95 7.82   HGB 11.2* 11.2*   HCT 35.6* 35.7*    224     CMP:   Recent Labs   Lab 12/14/18  0602 12/15/18  0443    136   K 4.8 4.4    101   CO2 27 23   * 196*   BUN 23 21   CREATININE 1.6* 1.4   CALCIUM 9.9 10.3   ANIONGAP 11 12   EGFRNONAA 40.4* 47.4*       Significant Imaging: I have reviewed all pertinent imaging results/findings within the past 24 hours.  I have reviewed and interpreted all pertinent imaging results/findings within the past 24 hours.

## 2018-12-15 NOTE — PROGRESS NOTES
[facility] Hospital Medicine  Progress Note    Patient Name: Jai Godinez  MRN: [id number]  Patient Class: IP- Inpatient   Admission Date: 12/6/2018  Length of Stay: 9 days  Attending Physician: Citlali Bazan MD  Primary Care Provider: Pepe Valverde MD    [facility] Team: [facility] MED 5 Miguel Angel Jones MD    Subjective:     Principal Problem:Amputated great toe of right foot    HPI:  This is a 78 year old female transferred from the [facility] for vascular surgery repair of right great toe status post amputation. He has a PMH of quadruple bypass (2001), severe peripheral artery disease, HFrEF (EF 45-50%), T2DM (A1c 8.1), HTN, SANDIE (on CPAP nightly) and diverticulosis.     He was hospitalized in [place] from 11/14 to 11/21 for acute on chronic heart failure exacerbation; discharge home with resolution of symptoms. He then developed one day of progressively worsening swelling of the right great toe associated with erythematous skin changes; for relief, he cut the skin of the toe with his pocket knife, at which point noticed purulent discharge, [facility]     Patient underwent right toe amputation done on 12/3 for pre-operative DANILO indicative of severe PAD. His post-operative course was complicated by development non-pulsatile blood flow after, as a result podiatry has left wound open. Vascular  at [facility] wanted to due angioplasty post-op, but CO2 angiography wasn't available. Also, concern for possible right foot osteomyelitis with wound cultures growing MSSA and Group G Streptococcus; blood cultures negative; ID there treated with Ceftriaxone while in-patient, with plans to discharge on Doxycycline.  In addition, on presentation, he was noted to have new development of Afib with slow ventricular response; rate control with Metoprolol and started on heparin drip while inpatient; transitioned to Dabigatran prior to transfer.     Patient/family requested transfer elsewhere  to accomplish procedure. [facility] center contacted today and case was discussed with Dr. Patricia Sweeney,  who requested patient to transfer to hospital medicine with vascular surgery consults.        On arrival here, he reports episodic cramping pain isolated to the amputation site occurring approximately Q5min. Pain relieved by taking Tylenol PRN. Denies fevers, chills, chest pain, SOB, palpitations, numbness of extremities.         Hospital Course:  12/07: No acute events overnight. Vascular surgery assessed this morning; recommended repeat DANILO and tentative angiogram today. Wound care and podiatry consulted for open wound and right foot osteomyelitis.  Wound care defering to podiatry. Podiatry repeating cultures and XR of foot. ID consulted for antibiotic recs; Ceftriaxone changed to Cefazolin (day #1). LR maintenance started to attempt optimization of kidney function; d/c home Lisinopril. [facility].   12/08: No events overnight. DANILO showing mild PAD. Scheduled for angiogram 12/12. BUN and Cr improved to 29 and 1.4 with maintenance LR; GFR 47. ESR 93, CRP 75. Day #2 of Cefazolin.   12/09: No events overnight. Day #3 of Cefazolin. Renal function same. Tylenol PRN changed to Gabapentin with pain improvement.   12/10: No events overnight. Day #4 of Cefazolin. Renal function same. Evaluated by Vascular; angiogram moved up to tomorrow. NPO at midnight.   12/11: No events overnight. Angiogram scheduled today. Day #5 of Cefazolin.   12/12: Angiogram yesterday concerning for no significant blood flow with collaterals below the right ankle. Vascular recommended hyperbaric oxygen as possible treatment modality, but not likely option given patient's insurance status. Discussed case with podiatry; will speak to vascular and discuss blood flow to area for possible amputation. No events overnight. Day #6 of Cefazolin.   12/13: No events overnight. Podiatry evaluated; planning for outpatient  [facility], with attempted treatment with IV antibiotics and wound vac, before amputation. PICC line placed today. Home health orders placed for attempted insurance coverage with [facility]; still waiting on answer. Day #7 of Cefazolin; per ID final recommendations will anticipate 6 week course of IV Cefazolin 2 g Q12H with Flagyl 500 mg Q8H with anticipated end date of 01/18/19 based on culture results from [facility].   12/14: No events overnight. Day #8 of Cefazolin. Pending IV antibiotic approval from [facility]. Heparin GTT discontinued; started on home Pradaxa. Added detemir to aspart insulin coverage. Labs now Q48H.     Interval History:  Patient and wife seen at bedside this AM. He again has new complaints. Pain remains controlled. He is participating in PT, tolerating PO, having BM, and urinating normally. Updated family regarding pending approval from [facility] for IV antibiotics at home. Wife updated me that he now has outpatient follow-up at [facility] with cardiology on 12/21/2018. Also, informed me that [facility] would be "outsourcing" his podiatry follow-up.     Review of Systems   Constitutional: Negative for appetite change, chills, diaphoresis, fatigue and fever.   HENT: Negative for congestion, sore throat and trouble swallowing.    Eyes: Negative for photophobia, pain and discharge.   Respiratory: Negative for cough, chest tightness and shortness of breath.    Cardiovascular: Negative for chest pain, palpitations and leg swelling.   Gastrointestinal: Negative for abdominal pain, diarrhea, nausea and vomiting.   Genitourinary: Negative for decreased urine volume, difficulty urinating and urgency.   Musculoskeletal: Positive for gait problem. Negative for back pain, myalgias and neck pain.   Skin: Positive for wound. Negative for pallor and rash.   Neurological: Negative for dizziness, weakness, light-headedness, numbness and headaches.     Objective:     Vital Signs (Most Recent):  Temp: 98.5 °F (36.9 °C) " (12/15/18 0800)  Pulse: 65 (12/15/18 0800)  Resp: 18 (12/15/18 0800)  BP: (!) 113/57 (12/15/18 0800)  SpO2: 96 % (12/15/18 0800) Vital Signs (24h Range):  Temp:  [96.5 °F (35.8 °C)-98.5 °F (36.9 °C)] 98.5 °F (36.9 °C)  Pulse:  [58-81] 65  Resp:  [15-18] 18  SpO2:  [93 %-99 %] 96 %  BP: (113-172)/(57-76) 113/57     Weight: 104.3 kg (230 lb)  Body mass index is 33.97 kg/m².    Intake/Output Summary (Last 24 hours) at 12/15/2018 0819  Last data filed at 12/14/2018 1825  Gross per 24 hour   Intake 1080 ml   Output --   Net 1080 ml      Physical Exam   Constitutional: He is oriented to person, place, and time. He appears well-developed and well-nourished. No distress.   HENT:   Head: Normocephalic and atraumatic.   Mouth/Throat: Uvula is midline, oropharynx is clear and moist and mucous membranes are normal.   Eyes: Conjunctivae are normal. Pupils are equal, round, and reactive to light.   Neck: No JVD present.   Cardiovascular: Normal rate and normal heart sounds. An irregularly irregular rhythm present.   No murmur heard.  Pulses:       Radial pulses are 2+ on the right side, and 2+ on the left side.        Dorsalis pedis pulses are 1+ on the right side, and 2+ on the left side.   Well-healed sternotomy scar in midline.    Pulmonary/Chest: Effort normal and breath sounds normal. No respiratory distress.   Abdominal: Soft. Normal appearance and bowel sounds are normal. He exhibits no distension. There is no tenderness.   Musculoskeletal:   Extensive 3 cm open wound on right foot near site of 1st toe amputation with exposure of underlying bones. No purulent discharge. Dressing clean, dry, intact.    Neurological: He is alert and oriented to person, place, and time.   Skin: Skin is warm and dry. No bruising and no rash noted.       Significant Labs:   CBC:   Recent Labs   Lab 12/14/18  0602 12/15/18  0443   WBC 7.95 7.82   HGB 11.2* 11.2*   HCT 35.6* 35.7*    224     CMP:   Recent Labs   Lab 12/14/18  0602  12/15/18  0443    136   K 4.8 4.4    101   CO2 27 23   * 196*   BUN 23 21   CREATININE 1.6* 1.4   CALCIUM 9.9 10.3   ANIONGAP 11 12   EGFRNONAA 40.4* 47.4*       Significant Imaging: I have reviewed all pertinent imaging results/findings within the past 24 hours.  I have reviewed and interpreted all pertinent imaging results/findings within the past 24 hours.    Assessment/Plan:      * Amputated great toe of right foot    · Status post amputation of first toe of right foot on 12/03 at [facility] secondary to either severe PAD with possible super-imposed osteomyelitis.   · [facility] that [facility] course course was complicated by development non-pulsatile blood flow after, as a result podiatry has left wound open. Vascular at [facility] wanted to due angioplasty post-op, but CO2 angiography wasn't available, prompting transfer here. See vascular surgery and podiatry note for images of wound.   · Case discussed with Dr. Gomez in Vascular Surgery here at transfer. Vascular surgery evaluated; recommended repeating DANILO's and tentatively planning for angiogram. DANILO's ordered; only showing mild PAD. Angiogram on 12/11 concerning for no significant blood flow with collaterals below the right ankle; recommended hyperbaric O2 as treatment modality, but not likely option given patient's insurance status. Podiatry re-evaluated; planning on outpatient debridement at [facility] with attempted treatment with IV antibiotics and wound vac, before possible amputation.    · Exam notable for diminished pulses in right foot and lower extremity. .   · Continuing heparin drip at low intensity for anticoagulation with new Afib.   · Wound care consulted; deferred care recommendations to podiatry.   · Tylenol PRN changed to Gabapentin 100 mg TID with improved control.   · PT/OT ordered.        Open wound of right foot    See assessment for right great toe amputation and right foot osteomyelitis.        Stage 3 chronic kidney disease     · Improved.   · Unclear of baseline; no outside records available. Main hinderance preventing patient from receiving regular angiogram vs with CO2.   · Could be acute, but patient with history of poorly controlled T2DM.   · BUN 37, Cr 1.6, and GFR 40 on admission.    · Held home Lisinopril in setting of possible TSACY, and discontinued secondary to side effects.   · BMP Q48H.           Osteomyelitis of right foot    · Concern for possible right foot osteomyelitis with wound cultures growing MSSA and Group G Streptococcus; blood cultures negative; ID there treated with Ceftriaxone while in-patient, with plans to discharge on Doxycycline. [facility].   · Status post debridement by podiatry at [facility] prior to transfer.   · Unclear if patient had imaging studies suggesting or confirming.   · Afebrile. No leukocytosis. ESR 75. CRP 93.    · Blood cultures ordered.   · ID consulted for recommendations regarding correct choice of antibiotic at [facility] and anticipated stop date of course. Status post one day of Ceftriaxone. Day #9 of Cefazolin per ID recs; ID anticipates 6 weeks of IV ABX as with exposed bone. PICC line placed.  · Podiatry consulted; wound cultures ordered. Wound to be dressed with Betadyne and Kerflex for now. Recommending wound vac at discharge.   · Podiatry evaluated; plan is for outpatient debridement at [facility] possibly next week. Wife reports [facility] notified her that podiatry follow-up will be outsourced according to insurance provider.     ID discharge recommendations are as follows:   1.  Continue cefazolin 2 grams IV Q12 hours (renally dosed.  Discussed with ID PharmD).  2.  Add flagyl 500 mg orally Q8 hours for anaerobic coverage   3.  Anticipate 6 weeks of IV abx given with exposed bone.  Estimated end date 1/18/19  4.  Weekly cbc, cmp, crp, esr.  Until re-established with [facility] ID, fax results to ID at [phone]  5.  Will need ID follow up at [facility]       Atrial fibrillation with slow  ventricular response    · Irregular irregular rhythm on exam with HR controlled in lower 60's.   · May restart home Metoprolol at 12.5 mg with parameters to hold for HR <65.   · Started on heparin infusion at [facility] with plans to transition with Dabigatran.   · INR 1.2 on admission.   · Started heparin infusion here with low intensity initially and followed aPTT until therapeutic.  · [facility] records mentioned plan to discharge patient on Dabigatran based on approved coverage. Transitioned to Dabigatran on 12/15 for anticipated discharge.   · Patient has outpatient follow-up with cardiology at [facility] on 12/21/18.        Chronic systolic heart failure    · Last EF 40-50% at [facility] per transfer note.   · No signs of fluid overload on exam.   · Managed with Lasix 40 mg BID, Lisinopril 40 mg, and Metoprolol daily.   · Holding Metoprolol temporarily with HR on admission in lower 60's and Lisinopril with possible STACY.  Lisinopril should be changed to ARB on discharge given history of chronic dry cough and worsening systemic rash after Lisinopril dose was increased some months ago.  · Will order cardiac and diabetic diet.         Essential hypertension    · Controlled.   · Resuming Lasix, and holding Metoprolol with HR in lower 60's.   · Discontinued home Lisinopril because of concern for possible acute on chronic STACY, but patient and wife reports history of dry cough and worsening systemic erythematous rash after Lisinopril dose was increased some months ago. Will enter as allergy in patient's chart.   · Will likely discharge on ARB instead of ACE-I.        Type 2 diabetes mellitus, without long-term current use of insulin    · Last A1c 8 at outside facility.   · Holding home Metformin and Glipizide.  · Added Aspart and Detemir for basal and prandial coverage.   · Low dose sliding scale ordered on admission.          Peripheral arterial disease    See assessment for amputation of great right toe.          VTE Risk Mitigation (From  admission, onward)        Ordered     dabigatran etexilate capsule 150 mg  2 times daily      12/15/18 1010     heparin, porcine (PF) 100 unit/mL injection flush 10 Units  As needed (PRN)      12/06/18 2119     IP VTE HIGH RISK PATIENT  Once      12/06/18 9239              Miguel Angel Jones MD  Department of Hospital Medicine   [facility]

## 2018-12-15 NOTE — ASSESSMENT & PLAN NOTE
· Irregular irregular rhythm on exam with HR controlled in lower 60's.   · May restart home Metoprolol at 12.5 mg with parameters to hold for HR <65.   · Started on heparin infusion at [facility] with plans to transition with Dabigatran.   · INR 1.2 on admission.   · Started heparin infusion here with low intensity initially and followed aPTT until therapeutic.  · [facility] records mentioned plan to discharge patient on Dabigatran based on approved coverage. Transitioned to Dabigatran on 12/15 for anticipated discharge.   · Patient has outpatient follow-up with cardiology at [facility] on 12/21/18.

## 2018-12-16 LAB
POCT GLUCOSE: 191 MG/DL (ref 70–110)
POCT GLUCOSE: 234 MG/DL (ref 70–110)
POCT GLUCOSE: 237 MG/DL (ref 70–110)
POCT GLUCOSE: 253 MG/DL (ref 70–110)

## 2018-12-16 PROCEDURE — 99232 SBSQ HOSP IP/OBS MODERATE 35: CPT | Mod: GC,,, | Performed by: HOSPITALIST

## 2018-12-16 PROCEDURE — 25000003 PHARM REV CODE 250: Performed by: NURSE PRACTITIONER

## 2018-12-16 PROCEDURE — 63600175 PHARM REV CODE 636 W HCPCS: Performed by: NURSE PRACTITIONER

## 2018-12-16 PROCEDURE — 11000001 HC ACUTE MED/SURG PRIVATE ROOM

## 2018-12-16 PROCEDURE — 99232 SBSQ HOSP IP/OBS MODERATE 35: CPT | Mod: ,,, | Performed by: PODIATRIST

## 2018-12-16 PROCEDURE — 25000003 PHARM REV CODE 250: Performed by: STUDENT IN AN ORGANIZED HEALTH CARE EDUCATION/TRAINING PROGRAM

## 2018-12-16 PROCEDURE — 94761 N-INVAS EAR/PLS OXIMETRY MLT: CPT

## 2018-12-16 RX ADMIN — ACETAMINOPHEN 1000 MG: 500 TABLET, FILM COATED ORAL at 06:12

## 2018-12-16 RX ADMIN — ACETAMINOPHEN 1000 MG: 500 TABLET, FILM COATED ORAL at 03:12

## 2018-12-16 RX ADMIN — GABAPENTIN 100 MG: 100 CAPSULE ORAL at 08:12

## 2018-12-16 RX ADMIN — CEFAZOLIN 2 G: 1 INJECTION, POWDER, FOR SOLUTION INTRAMUSCULAR; INTRAVENOUS at 06:12

## 2018-12-16 RX ADMIN — METRONIDAZOLE 500 MG: 500 TABLET ORAL at 09:12

## 2018-12-16 RX ADMIN — INSULIN ASPART 2 UNITS: 100 INJECTION, SOLUTION INTRAVENOUS; SUBCUTANEOUS at 01:12

## 2018-12-16 RX ADMIN — FUROSEMIDE 40 MG: 40 TABLET ORAL at 09:12

## 2018-12-16 RX ADMIN — METRONIDAZOLE 500 MG: 500 TABLET ORAL at 01:12

## 2018-12-16 RX ADMIN — GABAPENTIN 100 MG: 100 CAPSULE ORAL at 09:12

## 2018-12-16 RX ADMIN — PRAVASTATIN SODIUM 40 MG: 40 TABLET ORAL at 08:12

## 2018-12-16 RX ADMIN — INSULIN ASPART 4 UNITS: 100 INJECTION, SOLUTION INTRAVENOUS; SUBCUTANEOUS at 08:12

## 2018-12-16 RX ADMIN — DABIGATRAN ETEXILATE MESYLATE 150 MG: 150 CAPSULE ORAL at 09:12

## 2018-12-16 RX ADMIN — INSULIN ASPART 2 UNITS: 100 INJECTION, SOLUTION INTRAVENOUS; SUBCUTANEOUS at 05:12

## 2018-12-16 RX ADMIN — INSULIN ASPART 1 UNITS: 100 INJECTION, SOLUTION INTRAVENOUS; SUBCUTANEOUS at 09:12

## 2018-12-16 RX ADMIN — INSULIN ASPART 4 UNITS: 100 INJECTION, SOLUTION INTRAVENOUS; SUBCUTANEOUS at 05:12

## 2018-12-16 RX ADMIN — INSULIN DETEMIR 5 UNITS: 100 INJECTION, SOLUTION SUBCUTANEOUS at 09:12

## 2018-12-16 RX ADMIN — INSULIN ASPART 4 UNITS: 100 INJECTION, SOLUTION INTRAVENOUS; SUBCUTANEOUS at 01:12

## 2018-12-16 RX ADMIN — METRONIDAZOLE 500 MG: 500 TABLET ORAL at 06:12

## 2018-12-16 RX ADMIN — DABIGATRAN ETEXILATE MESYLATE 150 MG: 150 CAPSULE ORAL at 08:12

## 2018-12-16 NOTE — SUBJECTIVE & OBJECTIVE
Interval Hx: Pt seen at bedside, with wound vac intact. No pedal complaints at this time.     Scheduled Meds:   ceFAZolin (ANCEF) IVPB  2 g Intravenous Q12H    dabigatran etexilate  150 mg Oral BID    furosemide  40 mg Oral BID    gabapentin  100 mg Oral BID    insulin aspart U-100  4 Units Subcutaneous TIDWM    insulin detemir U-100  5 Units Subcutaneous QHS    metroNIDAZOLE  500 mg Oral Q8H    pravastatin  40 mg Oral Daily     Continuous Infusions:    PRN Meds:acetaminophen, acetaminophen, acetaminophen, dextrose 50%, dextrose 50%, diphenhydrAMINE-zinc acetate 1-0.1%, glucagon (human recombinant), glucose, glucose, heparin, porcine (PF), insulin aspart U-100, sodium chloride 0.9%    Review of patient's allergies indicates:  No Known Allergies     Past Medical History:   Diagnosis Date    CHF (congestive heart failure)     Coronary artery disease     Diabetes mellitus     Diverticulosis     Hx of CABG     Hypertension      Past Surgical History:   Procedure Laterality Date    Angiogram Extremity Unilateral - L CFA access, RLE angio with CO2 contrast N/A 12/11/2018    Performed by Peng Orr MD at [facility] [address] [place] [address]    ANGIOGRAPHY OF LOWER EXTREMITY N/A 12/11/2018    Procedure: Angiogram Extremity Unilateral - L CFA access, RLE angio with CO2 contrast;  Surgeon: Peng Orr MD;  Location: [facility] [address] [place] [address];  Service: Peripheral Vascular;  Laterality: N/A;  local:7ml  fluro:2.9min  mGy:576.22  co2: 200cc  contrast:8ml          APPENDECTOMY      BYPASS GRAFT         Family History     Problem Relation (Age of Onset)    Arthritis Sister    Dementia Mother    No Known Problems Father        Tobacco Use    Smoking status: Former Smoker     Packs/day: 1.00     Years: 25.00     Pack years: 25.00     Types: Cigarettes    Smokeless tobacco: Never Used   Substance and Sexual Activity    Alcohol use: No     Frequency: Never    Drug use: No    Sexual activity: Not Currently     Partners:  Female     Review of Systems   Constitutional: Negative for chills, diaphoresis, fatigue and fever.   Respiratory: Negative for chest tightness and shortness of breath.    Cardiovascular: Positive for leg swelling. Negative for chest pain.   Gastrointestinal: Negative for nausea and vomiting.   Skin: Positive for color change and wound.   Neurological: Positive for numbness.     Objective:     Vital Signs (Most Recent):  Temp: 97.1 °F (36.2 °C) (12/16/18 1132)  Pulse: (!) 53 (12/16/18 1132)  Resp: 18 (12/16/18 0851)  BP: (!) 120/57 (12/16/18 1132)  SpO2: 97 % (12/16/18 1132) Vital Signs (24h Range):  Temp:  [96.2 °F (35.7 °C)-97.8 °F (36.6 °C)] 97.1 °F (36.2 °C)  Pulse:  [41-72] 53  Resp:  [16-18] 18  SpO2:  [97 %-100 %] 97 %  BP: (101-120)/(50-57) 120/57     Weight: 104.3 kg (230 lb)  Body mass index is 33.97 kg/m².    Foot Exam    General  General Appearance: appears stated age and healthy   Orientation: alert and oriented to person, place, and time   Affect: appropriate       Right Foot/Ankle     Inspection and Palpation  Ecchymosis: none  Skin Exam: skin changes, abnormal color, ulcer and erythema; no drainage and no cellulitis     Neurovascular  Dorsalis pedis: absent  Posterior tibial: absent  Saphenous nerve sensation: diminished  Tibial nerve sensation: diminished  Superficial peroneal nerve sensation: diminished  Deep peroneal nerve sensation: diminished  Sural nerve sensation: diminished            Laboratory:  A1C: No results for input(s): HGBA1C in the last 4320 hours.  Blood Cultures:   No results for input(s): LABBLOO in the last 48 hours.  BMP:   Recent Labs   Lab 12/15/18  0443   *      K 4.4      CO2 23   BUN 21   CREATININE 1.4   CALCIUM 10.3     CBC:   Recent Labs   Lab 12/15/18  0443   WBC 7.82   RBC 3.82*   HGB 11.2*   HCT 35.7*      MCV 94   MCH 29.3   MCHC 31.4*     CMP:   Recent Labs   Lab 12/15/18  0443   *   CALCIUM 10.3      K 4.4   CO2 23       BUN 21   CREATININE 1.4     Coagulation:   Recent Labs   Lab 12/15/18  0443   APTT 36.8*     CRP: No results for input(s): CRP in the last 168 hours.  ESR:   No results for input(s): SEDRATE in the last 168 hours.  Prealbumin: No results for input(s): PREALBUMIN in the last 48 hours.  Wound Cultures:   Recent Labs   Lab 12/07/18 1122   LABAERO No growth     Microbiology Results (last 7 days)     Procedure Component Value Units Date/Time    Culture, Anaerobe [[id number]] Collected:  12/07/18 1122    Order Status:  Completed Specimen:  Wound from Foot, Right Updated:  12/14/18 0735     Anaerobic Culture No anaerobes isolated    Blood culture (site 1) [[id number]] Collected:  12/06/18 1951    Order Status:  Completed Specimen:  Blood Updated:  12/11/18 2212     Blood Culture, Routine No growth after 5 days.    Narrative:       Site # 1, aerobic and anaerobic    Blood culture (site 2) [[id number]] Collected:  12/06/18 1951    Order Status:  Completed Specimen:  Blood Updated:  12/11/18 2212     Blood Culture, Routine No growth after 5 days.    Narrative:       Site # 2, aerobic only    Aerobic culture [[id number]] Collected:  12/07/18 1122    Order Status:  Completed Specimen:  Wound from Foot, Right Updated:  12/10/18 0933     Aerobic Bacterial Culture No growth        Specimen (12h ago, onward)    None          Diagnostic Results:  X-Ray: I have reviewed all pertinent results/findings within the past 24 hours.  Ordered    Clinical Findings:    S/p R partial 1st ray amp with bone exposure. Fibro granular base in wound with distal necrosis noted. No maceration noted. No malodor or acute SOI noted, stable.

## 2018-12-16 NOTE — SUBJECTIVE & OBJECTIVE
Interval History:  Patient and wife seen at bedside this AM. He again has new complaints. Pain remains controlled. He is participating in PT, tolerating PO, having BM, and urinating normally.     Review of Systems   Constitutional: Negative for appetite change, chills, diaphoresis, fatigue and fever.   HENT: Negative for congestion, sore throat and trouble swallowing.    Eyes: Negative for photophobia, pain and discharge.   Respiratory: Negative for cough, chest tightness and shortness of breath.    Cardiovascular: Negative for chest pain, palpitations and leg swelling.   Gastrointestinal: Negative for abdominal pain, diarrhea, nausea and vomiting.   Genitourinary: Negative for decreased urine volume, difficulty urinating and urgency.   Musculoskeletal: Positive for gait problem. Negative for back pain, myalgias and neck pain.   Skin: Positive for wound. Negative for pallor and rash.   Neurological: Negative for dizziness, weakness, light-headedness, numbness and headaches.     Objective:     Vital Signs (Most Recent):  Temp: 97.8 °F (36.6 °C) (12/16/18 0851)  Pulse: (!) 41 (12/16/18 0851)  Resp: 18 (12/16/18 0851)  BP: (!) 110/54 (12/16/18 0005)  SpO2: 99 % (12/16/18 0851) Vital Signs (24h Range):  Temp:  [96.2 °F (35.7 °C)-97.8 °F (36.6 °C)] 97.8 °F (36.6 °C)  Pulse:  [41-72] 41  Resp:  [16-18] 18  SpO2:  [98 %-100 %] 99 %  BP: (101-115)/(50-57) 110/54     Weight: 104.3 kg (230 lb)  Body mass index is 33.97 kg/m².    Intake/Output Summary (Last 24 hours) at 12/16/2018 0852  Last data filed at 12/16/2018 0600  Gross per 24 hour   Intake 1305 ml   Output 1400 ml   Net -95 ml      Physical Exam   Constitutional: He is oriented to person, place, and time. He appears well-developed and well-nourished. No distress.   HENT:   Head: Normocephalic and atraumatic.   Mouth/Throat: Uvula is midline, oropharynx is clear and moist and mucous membranes are normal.   Eyes: Conjunctivae are normal. Pupils are equal, round, and  reactive to light.   Neck: No JVD present.   Cardiovascular: Normal rate and normal heart sounds. An irregularly irregular rhythm present.   No murmur heard.  Pulses:       Radial pulses are 2+ on the right side, and 2+ on the left side.        Dorsalis pedis pulses are 1+ on the right side, and 2+ on the left side.   Well-healed sternotomy scar in midline.    Pulmonary/Chest: Effort normal and breath sounds normal. No respiratory distress.   Abdominal: Soft. Normal appearance and bowel sounds are normal. He exhibits no distension. There is no tenderness.   Musculoskeletal:   Extensive 3 cm open wound on right foot near site of 1st toe amputation with exposure of underlying bones. No purulent discharge. Dressing clean, dry, intact.    Neurological: He is alert and oriented to person, place, and time.   Skin: Skin is warm and dry. No bruising and no rash noted.       Significant Labs:   CBC:   Recent Labs   Lab 12/15/18  0443   WBC 7.82   HGB 11.2*   HCT 35.7*        CMP:   Recent Labs   Lab 12/15/18  0443      K 4.4      CO2 23   *   BUN 21   CREATININE 1.4   CALCIUM 10.3   ANIONGAP 12   EGFRNONAA 47.4*       Significant Imaging: I have reviewed all pertinent imaging results/findings within the past 24 hours.  I have reviewed and interpreted all pertinent imaging results/findings within the past 24 hours.

## 2018-12-16 NOTE — ASSESSMENT & PLAN NOTE
· Status post amputation of first toe of right foot on 12/03 at [facility] secondary to either severe PAD with possible super-imposed osteomyelitis.   · [facility] that [facility] course course was complicated by development non-pulsatile blood flow after, as a result podiatry has left wound open. Vascular at [facility] wanted to due angioplasty post-op, but CO2 angiography wasn't available, prompting transfer here. See vascular surgery and podiatry note for images of wound.   · Case discussed with Dr. Gomez in Vascular Surgery here at transfer. Vascular surgery evaluated; recommended repeating DANILO's and tentatively planning for angiogram. DANILO's ordered; only showing mild PAD. Angiogram on 12/11 concerning for no significant blood flow with collaterals below the right ankle; recommended hyperbaric O2 as treatment modality, but not likely option given patient's insurance status. Podiatry re-evaluated; planning on outpatient debridement at [facility] with attempted treatment with IV antibiotics and wound vac, before possible amputation. SW working on podiatry follow-up at [facility].   · Exam notable for diminished pulses in right foot and lower extremity. .   · On heparin drip at low intensity initially for anticoagulation with new Afib. Transitioned to home Pradaxa on 12/15.   · Wound care consulted; deferred care recommendations to podiatry.   · Tylenol PRN changed to Gabapentin 100 mg TID with improved control.   · PT/OT ordered.

## 2018-12-16 NOTE — PLAN OF CARE
Problem: Patient Care Overview  Goal: Plan of Care Review  Outcome: Ongoing (interventions implemented as appropriate)  Pt is stable, he is able to turn and position self, he ambulated to the restroom with walker provided in the room and used urinal and I&O's recorded.  He did not have any bowel movement during day shift but reported having bowel movement last night.  Otherwise pt has not complained of any pain during day shift and didn't ask for pain medications.  Pt stated he feels frustrated and wants to know what the discharge plan is for him and is anticipating to go home. Pt has needed to be covered throughout this shift during each meal.  Otherwise, no significant issues at this time.

## 2018-12-16 NOTE — PROGRESS NOTES
[facility] Hospital Medicine  Progress Note    Patient Name: Jai Godinez  MRN: [id number]  Patient Class: IP- Inpatient   Admission Date: 12/6/2018  Length of Stay: 10 days  Attending Physician: Citlali Bazan MD  Primary Care Provider: Pepe Valverde MD    [facility] Medicine Team: [facility] HOSP MED 5 Miguel Angel Jones MD    Subjective:     Principal Problem:Amputated great toe of right foot    HPI:  This is a 78 year old female transferred from the [facility] for vascular surgery repair of right great toe status post amputation. He has a PMH of quadruple bypass (2001), severe peripheral artery disease, HFrEF (EF 45-50%), T2DM (A1c 8.1), HTN, SANDIE (on CPAP nightly) and diverticulosis.     He was hospitalized in [place] from 11/14 to 11/21 for acute on chronic heart failure exacerbation; discharge home with resolution of symptoms. He then developed one day of progressively worsening swelling of the right great toe associated with erythematous skin changes; for relief, he cut the skin of the toe with his pocket knife, at which point noticed purulent discharge, [facility]     Patient underwent right toe amputation done on 12/3 for pre-operative DANILO indicative of severe PAD. His post-operative course was complicated by development non-pulsatile blood flow after, as a result podiatry has left wound open. Vascular  at [facility] wanted to due angioplasty post-op, but CO2 angiography wasn't available. Also, concern for possible right foot osteomyelitis with wound cultures growing MSSA and Group G Streptococcus; blood cultures negative; ID there treated with Ceftriaxone while in-patient, with plans to discharge on Doxycycline.  In addition, on presentation, he was noted to have new development of Afib with slow ventricular response; rate control with Metoprolol and started on heparin drip while inpatient; transitioned to Dabigatran prior to transfer.     Patient/family requested transfer elsewhere  to accomplish procedure. [facility] contacted today and case was discussed with Dr. Patricia Sweeney,  who requested patient to transfer to hospital medicine with vascular surgery consults.        On arrival here, he reports episodic cramping pain isolated to the amputation site occurring approximately Q5min. Pain relieved by taking Tylenol PRN. Denies fevers, chills, chest pain, SOB, palpitations, numbness of extremities.         Hospital Course:  12/07: No acute events overnight. Vascular surgery assessed this morning; recommended repeat DANILO and tentative angiogram today. Wound care and podiatry consulted for open wound and right foot osteomyelitis.  Wound care defering to podiatry. Podiatry repeating cultures and XR of foot. ID consulted for antibiotic recs; Ceftriaxone changed to Cefazolin (day #1). LR maintenance started to attempt optimization of kidney function; d/c home Lisinopril. [facility].   12/08: No events overnight. DANILO showing mild PAD. Scheduled for angiogram 12/12. BUN and Cr improved to 29 and 1.4 with maintenance LR; GFR 47. ESR 93, CRP 75. Day #2 of Cefazolin.   12/09: No events overnight. Day #3 of Cefazolin. Renal function same. Tylenol PRN changed to Gabapentin with pain improvement.   12/10: No events overnight. Day #4 of Cefazolin. Renal function same. Evaluated by Vascular; angiogram moved up to tomorrow. NPO at midnight.   12/11: No events overnight. Angiogram scheduled today. Day #5 of Cefazolin.   12/12: Angiogram yesterday concerning for no significant blood flow with collaterals below the right ankle. Vascular recommended hyperbaric oxygen as possible treatment modality, but not likely option given patient's insurance status. Discussed case with podiatry; will speak to vascular and discuss blood flow to area for possible amputation. No events overnight. Day #6 of Cefazolin.   12/13: No events overnight. Podiatry evaluated; planning for [facility]  [facility], with attempted treatment with IV antibiotics and wound vac, before amputation. PICC line placed today. Home health orders placed for attempted insurance coverage with [facility]; still waiting on answer. Day #7 of Cefazolin; per ID final recommendations will anticipate 6 week course of IV Cefazolin 2 g Q12H with Flagyl 500 mg Q8H with anticipated end date of 01/18/19 based on culture results from [facility].   12/15: No events overnight. Day #9 of Cefazolin. Pending IV antibiotic approval from [facility]. Heparin GTT discontinued; started on home Pradaxa. Added detemir to aspart insulin coverage. Labs now Q48H.   12/16: No events overnight. Day #10 of Cefazolin; Day #4 of Flagyl.       Interval History:  Patient and wife seen at bedside this AM. He again has new complaints. Pain remains controlled. He is participating in PT, tolerating PO, having BM, and urinating normally.     Review of Systems   Constitutional: Negative for appetite change, chills, diaphoresis, fatigue and fever.   HENT: Negative for congestion, sore throat and trouble swallowing.    Eyes: Negative for photophobia, pain and discharge.   Respiratory: Negative for cough, chest tightness and shortness of breath.    Cardiovascular: Negative for chest pain, palpitations and leg swelling.   Gastrointestinal: Negative for abdominal pain, diarrhea, nausea and vomiting.   Genitourinary: Negative for decreased urine volume, difficulty urinating and urgency.   Musculoskeletal: Positive for gait problem. Negative for back pain, myalgias and neck pain.   Skin: Positive for wound. Negative for pallor and rash.   Neurological: Negative for dizziness, weakness, light-headedness, numbness and headaches.     Objective:     Vital Signs (Most Recent):  Temp: 97.8 °F (36.6 °C) (12/16/18 0851)  Pulse: (!) 41 (12/16/18 0851)  Resp: 18 (12/16/18 0851)  BP: (!) 110/54 (12/16/18 0005)  SpO2: 99 % (12/16/18 0851) Vital Signs (24h Range):  Temp:  [96.2 °F  (35.7 °C)-97.8 °F (36.6 °C)] 97.8 °F (36.6 °C)  Pulse:  [41-72] 41  Resp:  [16-18] 18  SpO2:  [98 %-100 %] 99 %  BP: (101-115)/(50-57) 110/54     Weight: 104.3 kg (230 lb)  Body mass index is 33.97 kg/m².    Intake/Output Summary (Last 24 hours) at 12/16/2018 0852  Last data filed at 12/16/2018 0600  Gross per 24 hour   Intake 1305 ml   Output 1400 ml   Net -95 ml      Physical Exam   Constitutional: He is oriented to person, place, and time. He appears well-developed and well-nourished. No distress.   HENT:   Head: Normocephalic and atraumatic.   Mouth/Throat: Uvula is midline, oropharynx is clear and moist and mucous membranes are normal.   Eyes: Conjunctivae are normal. Pupils are equal, round, and reactive to light.   Neck: No JVD present.   Cardiovascular: Normal rate and normal heart sounds. An irregularly irregular rhythm present.   No murmur heard.  Pulses:       Radial pulses are 2+ on the right side, and 2+ on the left side.        Dorsalis pedis pulses are 1+ on the right side, and 2+ on the left side.   Well-healed sternotomy scar in midline.    Pulmonary/Chest: Effort normal and breath sounds normal. No respiratory distress.   Abdominal: Soft. Normal appearance and bowel sounds are normal. He exhibits no distension. There is no tenderness.   Musculoskeletal:   Extensive 3 cm open wound on right foot near site of 1st toe amputation with exposure of underlying bones. No purulent discharge. Dressing clean, dry, intact.    Neurological: He is alert and oriented to person, place, and time.   Skin: Skin is warm and dry. No bruising and no rash noted.       Significant Labs:   CBC:   Recent Labs   Lab 12/15/18  0443   WBC 7.82   HGB 11.2*   HCT 35.7*        CMP:   Recent Labs   Lab 12/15/18  0443      K 4.4      CO2 23   *   BUN 21   CREATININE 1.4   CALCIUM 10.3   ANIONGAP 12   EGFRNONAA 47.4*       Significant Imaging: I have reviewed all pertinent imaging results/findings within  the past 24 hours.  I have reviewed and interpreted all pertinent imaging results/findings within the past 24 hours.    Assessment/Plan:      * Amputated great toe of right foot    · Status post amputation of first toe of right foot on 12/03 at [facility] secondary to either severe PAD with possible super-imposed osteomyelitis.   · [facility] that post-[facility] course course was complicated by development non-pulsatile blood flow after, as a result podiatry has left wound open. Vascular at [facility] wanted to due angioplasty post-op, but CO2 angiography wasn't available, prompting transfer here. See vascular surgery and podiatry note for images of wound.   · Case discussed with Dr. Gomez in Vascular Surgery here at transfer. Vascular surgery evaluated; recommended repeating DANILO's and tentatively planning for angiogram. DANILO's ordered; only showing mild PAD. Angiogram on 12/11 concerning for no significant blood flow with collaterals below the right ankle; recommended hyperbaric O2 as treatment modality, but not likely option given patient's insurance status. Podiatry re-evaluated; planning on outpatient debridement at [facility] with attempted treatment with IV antibiotics and wound vac, before possible amputation. SW working on podiatry follow-up at [facility].   · Exam notable for diminished pulses in right foot and lower extremity. .   · On heparin drip at low intensity initially for anticoagulation with new Afib. Transitioned to home Pradaxa on 12/15.   · Wound care consulted; deferred care recommendations to podiatry.   · Tylenol PRN changed to Gabapentin 100 mg TID with improved control.   · PT/OT ordered.        Open wound of right foot    See assessment for right great toe amputation and right foot osteomyelitis.        Stage 3 chronic kidney disease    · Improved.   · Unclear of baseline; no outside records available. Main hinderance preventing patient from receiving regular angiogram vs with CO2.   · Could be acute, but patient  with history of poorly controlled T2DM.   · BUN 37, Cr 1.6, and GFR 40 on admission.    · Held home Lisinopril in setting of possible STACY, and discontinued secondary to side effects.   · BMP Q48H.           Osteomyelitis of right foot    · Concern for possible right foot osteomyelitis with wound cultures growing MSSA and Group G Streptococcus; blood cultures negative; ID there treated with Ceftriaxone while in-patient, with plans to discharge on Doxycycline. [facility].   · Status post debridement by podiatry at [facility] prior to transfer.   · Unclear if patient had imaging studies suggesting or confirming.   · Afebrile. No leukocytosis. ESR 75. CRP 93.    · Blood cultures ordered.   · ID consulted for recommendations regarding correct choice of antibiotic at [facility] and anticipated stop date of course. Status post one day of Ceftriaxone. Day #10 of Cefazolin per ID recs; ID anticipates 6 weeks of IV ABX as with exposed bone. PICC line placed. Flagyl added on 12/13.   · Podiatry consulted; wound cultures ordered. Wound to be dressed with Betadyne and Kerflex for now. Recommending wound vac at discharge.   · Podiatry evaluated; plan is for outpatient debridement at [facility] possibly next week. Wife reports [facility] notified her that podiatry follow-up will be outsourced according to insurance provider.     ID discharge recommendations are as follows:   1.  Continue cefazolin 2 grams IV Q12 hours (renally dosed.  Discussed with ID PharmD).  2.  Add flagyl 500 mg orally Q8 hours for anaerobic coverage   3.  Anticipate 6 weeks of IV abx given with exposed bone.  Estimated end date 1/18/19  4.  Weekly cbc, cmp, crp, esr.  Until re-established with [facility] ID, fax results to ID at [phone]  5.  Will need ID follow up at [facility]       Atrial fibrillation with slow ventricular response    · Irregular irregular rhythm on exam with HR controlled in lower 60's.   · May restart home Metoprolol at 12.5 mg with parameters to  hold for HR <65.   · Started on heparin infusion at [facility] with plans to transition with Dabigatran.   · INR 1.2 on admission.   · Started heparin infusion here with low intensity initially and followed aPTT until therapeutic.  · [facility] records mentioned plan to discharge patient on Dabigatran based on approved coverage. Transitioned to Dabigatran on 12/15 for anticipated discharge.   · Patient has outpatient follow-up with cardiology at [facility] on 12/21/18.        Chronic systolic heart failure    · Last EF 40-50% at [facility] per transfer note.   · No signs of fluid overload on exam.   · Managed with Lasix 40 mg BID, Lisinopril 40 mg, and Metoprolol daily.   · Holding Metoprolol temporarily with HR on admission in lower 60's and Lisinopril with possible STACY.  Lisinopril should be changed to ARB on discharge given history of chronic dry cough and worsening systemic rash after Lisinopril dose was increased some months ago.  · Will order cardiac and diabetic diet.         Essential hypertension    · Controlled.   · Resuming Lasix, and holding Metoprolol with HR in lower 60's.   · Discontinued home Lisinopril because of concern for possible acute on chronic STACY, but patient and wife reports history of dry cough and worsening systemic erythematous rash after Lisinopril dose was increased some months ago. Will enter as allergy in patient's chart.   · Will likely discharge on ARB instead of ACE-I.        Type 2 diabetes mellitus, without long-term current use of insulin    · Last A1c 8 at outside facility.   · Holding home Metformin and Glipizide.  · Added Aspart and Detemir for basal and prandial coverage.   · Low dose sliding scale ordered on admission.          Peripheral arterial disease    See assessment for amputation of great right toe.          VTE Risk Mitigation (From admission, onward)        Ordered     dabigatran etexilate capsule 150 mg  2 times daily      12/15/18 1010     heparin, porcine (PF) 100 unit/mL injection  flush 10 Units  As needed (PRN)      12/06/18 2119     IP VTE HIGH RISK PATIENT  Once      12/06/18 9970              Miguel Angel Jones MD  Department of Hospital Medicine   [facility]

## 2018-12-16 NOTE — ASSESSMENT & PLAN NOTE
· Concern for possible right foot osteomyelitis with wound cultures growing MSSA and Group G Streptococcus; blood cultures negative; ID there treated with Ceftriaxone while in-patient, with plans to discharge on Doxycycline. [facility].   · Status post debridement by podiatry at [facility] prior to transfer.   · Unclear if patient had imaging studies suggesting or confirming.   · Afebrile. No leukocytosis. ESR 75. CRP 93.    · Blood cultures ordered.   · ID consulted for recommendations regarding correct choice of antibiotic at [facility] and anticipated stop date of course. Status post one day of Ceftriaxone. Day #10 of Cefazolin per ID recs; ID anticipates 6 weeks of IV ABX as with exposed bone. PICC line placed. Flagyl added on 12/13.   · Podiatry consulted; wound cultures ordered. Wound to be dressed with Betadyne and Kerflex for now. Recommending wound vac at discharge.   · Podiatry evaluated; plan is for outpatient debridement at [facility] possibly next week. Wife reports [facility] notified her that podiatry follow-up will be outsourced according to insurance provider.     ID discharge recommendations are as follows:   1.  Continue cefazolin 2 grams IV Q12 hours (renally dosed.  Discussed with ID PharmD).  2.  Add flagyl 500 mg orally Q8 hours for anaerobic coverage   3.  Anticipate 6 weeks of IV abx given with exposed bone.  Estimated end date 1/18/19  4.  Weekly cbc, cmp, crp, esr.  Until re-established with [facility] ID, fax results to ID at [phone]  5.  Will need ID follow up at [facility]

## 2018-12-16 NOTE — ASSESSMENT & PLAN NOTE
· Irregular irregular rhythm on exam with HR controlled in lower 60's.   · May restart home Metoprolol at 12.5 mg with parameters to hold for HR <65.   · Started on heparin infusion at [facility] with plans to transition with Dabigatran.   · INR 1.2 on admission.   · Started heparin infusion here with low intensity initially and followed aPTT until therapeutic.  · [facility] records mentioned plan to discharge patient on Dabigatran based on approved coverage. Transitioned to Dabigatran on 12/15 for anticipated discharge.   · Patient has outpatient follow-up with cardiology at [facility] on 12/21/18.

## 2018-12-16 NOTE — ASSESSMENT & PLAN NOTE
· Last EF 40-50% at [facility] per transfer note.   · No signs of fluid overload on exam.   · Managed with Lasix 40 mg BID, Lisinopril 40 mg, and Metoprolol daily.   · Holding Metoprolol temporarily with HR on admission in lower 60's and Lisinopril with possible STACY.  Lisinopril should be changed to ARB on discharge given history of chronic dry cough and worsening systemic rash after Lisinopril dose was increased some months ago.  · Will order cardiac and diabetic diet.

## 2018-12-16 NOTE — PROGRESS NOTES
[facility]  Podiatry  Progress Note    Patient Name: Jai Godinez  MRN: [id number]  Admission Date: 12/6/2018  Hospital Length of Stay: 10 days  Attending Physician: Citlali Bazan MD  Primary Care Provider: Pepe Valverde MD   Interval Hx: Pt seen at bedside, with wound vac intact. No pedal complaints at this time.     Scheduled Meds:   ceFAZolin (ANCEF) IVPB  2 g Intravenous Q12H    dabigatran etexilate  150 mg Oral BID    furosemide  40 mg Oral BID    gabapentin  100 mg Oral BID    insulin aspart U-100  4 Units Subcutaneous TIDWM    insulin detemir U-100  5 Units Subcutaneous QHS    metroNIDAZOLE  500 mg Oral Q8H    pravastatin  40 mg Oral Daily     Continuous Infusions:    PRN Meds:acetaminophen, acetaminophen, acetaminophen, dextrose 50%, dextrose 50%, diphenhydrAMINE-zinc acetate 1-0.1%, glucagon (human recombinant), glucose, glucose, heparin, porcine (PF), insulin aspart U-100, sodium chloride 0.9%    Review of patient's allergies indicates:  No Known Allergies     Past Medical History:   Diagnosis Date    CHF (congestive heart failure)     Coronary artery disease     Diabetes mellitus     Diverticulosis     Hx of CABG     Hypertension      Past Surgical History:   Procedure Laterality Date    Angiogram Extremity Unilateral - L CFA access, RLE angio with CO2 contrast N/A 12/11/2018    Performed by Peng Orr MD at [facility] OR [address] [place] [address]    ANGIOGRAPHY OF LOWER EXTREMITY N/A 12/11/2018    Procedure: Angiogram Extremity Unilateral - L CFA access, RLE angio with CO2 contrast;  Surgeon: Peng Orr MD;  Location: [facility] OR [address] [place] [address];  Service: Peripheral Vascular;  Laterality: N/A;  local:7ml  fluro:2.9min  mGy:576.22  co2: 200cc  contrast:8ml          APPENDECTOMY      BYPASS GRAFT         Family History     Problem Relation (Age of Onset)    Arthritis Sister    Dementia Mother    No Known Problems Father        Tobacco Use    Smoking status: Former Smoker      Packs/day: 1.00     Years: 25.00     Pack years: 25.00     Types: Cigarettes    Smokeless tobacco: Never Used   Substance and Sexual Activity    Alcohol use: No     Frequency: Never    Drug use: No    Sexual activity: Not Currently     Partners: Female     Review of Systems   Constitutional: Negative for chills, diaphoresis, fatigue and fever.   Respiratory: Negative for chest tightness and shortness of breath.    Cardiovascular: Positive for leg swelling. Negative for chest pain.   Gastrointestinal: Negative for nausea and vomiting.   Skin: Positive for color change and wound.   Neurological: Positive for numbness.     Objective:     Vital Signs (Most Recent):  Temp: 97.1 °F (36.2 °C) (12/16/18 1132)  Pulse: (!) 53 (12/16/18 1132)  Resp: 18 (12/16/18 0851)  BP: (!) 120/57 (12/16/18 1132)  SpO2: 97 % (12/16/18 1132) Vital Signs (24h Range):  Temp:  [96.2 °F (35.7 °C)-97.8 °F (36.6 °C)] 97.1 °F (36.2 °C)  Pulse:  [41-72] 53  Resp:  [16-18] 18  SpO2:  [97 %-100 %] 97 %  BP: (101-120)/(50-57) 120/57     Weight: 104.3 kg (230 lb)  Body mass index is 33.97 kg/m².    Foot Exam    General  General Appearance: appears stated age and healthy   Orientation: alert and oriented to person, place, and time   Affect: appropriate       Right Foot/Ankle     Inspection and Palpation  Ecchymosis: none  Skin Exam: skin changes, abnormal color, ulcer and erythema; no drainage and no cellulitis     Neurovascular  Dorsalis pedis: absent  Posterior tibial: absent  Saphenous nerve sensation: diminished  Tibial nerve sensation: diminished  Superficial peroneal nerve sensation: diminished  Deep peroneal nerve sensation: diminished  Sural nerve sensation: diminished            Laboratory:  A1C: No results for input(s): HGBA1C in the last 4320 hours.  Blood Cultures:   No results for input(s): LABBLOO in the last 48 hours.  BMP:   Recent Labs   Lab 12/15/18  0443   *      K 4.4      CO2 23   BUN 21   CREATININE 1.4    CALCIUM 10.3     CBC:   Recent Labs   Lab 12/15/18  0443   WBC 7.82   RBC 3.82*   HGB 11.2*   HCT 35.7*      MCV 94   MCH 29.3   MCHC 31.4*     CMP:   Recent Labs   Lab 12/15/18  0443   *   CALCIUM 10.3      K 4.4   CO2 23      BUN 21   CREATININE 1.4     Coagulation:   Recent Labs   Lab 12/15/18  0443   APTT 36.8*     CRP: No results for input(s): CRP in the last 168 hours.  ESR:   No results for input(s): SEDRATE in the last 168 hours.  Prealbumin: No results for input(s): PREALBUMIN in the last 48 hours.  Wound Cultures:   Recent Labs   Lab 12/07/18 1122   LABAERO No growth     Microbiology Results (last 7 days)     Procedure Component Value Units Date/Time    Culture, Anaerobe [[id number]] Collected:  12/07/18 1122    Order Status:  Completed Specimen:  Wound from Foot, Right Updated:  12/14/18 0735     Anaerobic Culture No anaerobes isolated    Blood culture (site 1) [[id number]] Collected:  12/06/18 1951    Order Status:  Completed Specimen:  Blood Updated:  12/11/18 2212     Blood Culture, Routine No growth after 5 days.    Narrative:       Site # 1, aerobic and anaerobic    Blood culture (site 2) [[id number]] Collected:  12/06/18 1951    Order Status:  Completed Specimen:  Blood Updated:  12/11/18 2212     Blood Culture, Routine No growth after 5 days.    Narrative:       Site # 2, aerobic only    Aerobic culture [[id number]] Collected:  12/07/18 1122    Order Status:  Completed Specimen:  Wound from Foot, Right Updated:  12/10/18 0933     Aerobic Bacterial Culture No growth        Specimen (12h ago, onward)    None          Diagnostic Results:  X-Ray: I have reviewed all pertinent results/findings within the past 24 hours.  Ordered    Clinical Findings:    S/p R partial 1st ray amp with bone exposure. Fibro granular base in wound with distal necrosis noted. No maceration noted. No malodor or acute SOI noted, stable.                       Assessment/Plan:     * Amputated great toe of  right foot    -Pt with ischemic wound and exposed bone s/p right partial 1st ray amputation performed at [facility] on 12/3/18. Wound left open with bone exposure. No acute SOI noted stable  -Vasc sx on board, appreciate recs  -D/C wound vac for now, pt with fibrotic tissue, will switch to santyl, nursing to change dressings daily, orders placed.  -Plan for further debridement in future with podiatry at [facility]. Recommend hyperbaric oxygen therapy.   -ABX per ID  -HH to change dressings 2 x per week with santyl in wound, 4x4, anthony foam, cast padding, and flexinet.   -Plan to f/u with podiatry at [facility] for further debridement, recommend wound vac application once wound is granular.  -Ok to D/C from podiatry standpoint  -Podiatry will follow            Stage 3 chronic kidney disease    Per primary     Osteomyelitis of right foot    Pt s/p amputation with exposed bone, no acute SOI noted, stable.   ID on board for management of OM, appreciate recs     Type 2 diabetes mellitus, without long-term current use of insulin    Per primary     Peripheral arterial disease    Vasc sx on board, appreciate recs         Dorita Greenberg MD  Podiatry  [facility]

## 2018-12-16 NOTE — PLAN OF CARE
Problem: Patient Care Overview  Goal: Plan of Care Review  Outcome: Ongoing (interventions implemented as appropriate)   12/16/18 4167   Plan of Care Review   Plan of Care Reviewed With patient

## 2018-12-16 NOTE — ASSESSMENT & PLAN NOTE
-Pt with ischemic wound and exposed bone s/p right partial 1st ray amputation performed at [facility] on 12/3/18. Wound left open with bone exposure. No acute SOI noted stable  -Vasc sx on board, appreciate recs  -D/C wound vac for now, pt with fibrotic tissue, will switch to santyl, nursing to change dressings daily, orders placed.  -Plan for further debridement in future with podiatry at [facility]. Recommend hyperbaric oxygen therapy.   -ABX per ID  -HH to change dressings 2 x per week with santyl in wound, 4x4, anthony foam, cast padding, and flexinet.   -Plan to f/u with podiatry at [facility] for further debridement, recommend wound vac application once wound is granular.  -Ok to D/C from podiatry standpoint  -Podiatry will follow

## 2018-12-17 LAB
ANION GAP SERPL CALC-SCNC: 9 MMOL/L
BASOPHILS # BLD AUTO: 0.08 K/UL
BASOPHILS NFR BLD: 0.9 %
BUN SERPL-MCNC: 19 MG/DL
CALCIUM SERPL-MCNC: 9.9 MG/DL
CHLORIDE SERPL-SCNC: 103 MMOL/L
CO2 SERPL-SCNC: 25 MMOL/L
CREAT SERPL-MCNC: 1.4 MG/DL
DIFFERENTIAL METHOD: ABNORMAL
EOSINOPHIL # BLD AUTO: 0.3 K/UL
EOSINOPHIL NFR BLD: 3.7 %
ERYTHROCYTE [DISTWIDTH] IN BLOOD BY AUTOMATED COUNT: 15 %
EST. GFR  (AFRICAN AMERICAN): 54.9 ML/MIN/1.73 M^2
EST. GFR  (NON AFRICAN AMERICAN): 47.4 ML/MIN/1.73 M^2
GLUCOSE SERPL-MCNC: 194 MG/DL
HCT VFR BLD AUTO: 39.2 %
HGB BLD-MCNC: 11.8 G/DL
IMM GRANULOCYTES # BLD AUTO: 0.04 K/UL
IMM GRANULOCYTES NFR BLD AUTO: 0.5 %
LYMPHOCYTES # BLD AUTO: 1.3 K/UL
LYMPHOCYTES NFR BLD: 14.5 %
MCH RBC QN AUTO: 29 PG
MCHC RBC AUTO-ENTMCNC: 30.1 G/DL
MCV RBC AUTO: 96 FL
MONOCYTES # BLD AUTO: 1 K/UL
MONOCYTES NFR BLD: 11.3 %
NEUTROPHILS # BLD AUTO: 6.1 K/UL
NEUTROPHILS NFR BLD: 69.1 %
NRBC BLD-RTO: 0 /100 WBC
PLATELET # BLD AUTO: 204 K/UL
PMV BLD AUTO: 10.9 FL
POCT GLUCOSE: 186 MG/DL (ref 70–110)
POCT GLUCOSE: 200 MG/DL (ref 70–110)
POCT GLUCOSE: 201 MG/DL (ref 70–110)
POCT GLUCOSE: 205 MG/DL (ref 70–110)
POTASSIUM SERPL-SCNC: 4.4 MMOL/L
RBC # BLD AUTO: 4.07 M/UL
SODIUM SERPL-SCNC: 137 MMOL/L
WBC # BLD AUTO: 8.84 K/UL

## 2018-12-17 PROCEDURE — 85025 COMPLETE CBC W/AUTO DIFF WBC: CPT

## 2018-12-17 PROCEDURE — 80048 BASIC METABOLIC PNL TOTAL CA: CPT

## 2018-12-17 PROCEDURE — 99232 SBSQ HOSP IP/OBS MODERATE 35: CPT | Mod: GC,,, | Performed by: HOSPITALIST

## 2018-12-17 PROCEDURE — 25000003 PHARM REV CODE 250: Performed by: STUDENT IN AN ORGANIZED HEALTH CARE EDUCATION/TRAINING PROGRAM

## 2018-12-17 PROCEDURE — 11000001 HC ACUTE MED/SURG PRIVATE ROOM

## 2018-12-17 PROCEDURE — 63600175 PHARM REV CODE 636 W HCPCS: Performed by: NURSE PRACTITIONER

## 2018-12-17 PROCEDURE — 94660 CPAP INITIATION&MGMT: CPT

## 2018-12-17 PROCEDURE — 36415 COLL VENOUS BLD VENIPUNCTURE: CPT

## 2018-12-17 PROCEDURE — 25000003 PHARM REV CODE 250: Performed by: NURSE PRACTITIONER

## 2018-12-17 RX ADMIN — CEFAZOLIN 2 G: 1 INJECTION, POWDER, FOR SOLUTION INTRAMUSCULAR; INTRAVENOUS at 06:12

## 2018-12-17 RX ADMIN — COLLAGENASE SANTYL: 250 OINTMENT TOPICAL at 09:12

## 2018-12-17 RX ADMIN — ACETAMINOPHEN 1000 MG: 500 TABLET, FILM COATED ORAL at 02:12

## 2018-12-17 RX ADMIN — FUROSEMIDE 40 MG: 40 TABLET ORAL at 09:12

## 2018-12-17 RX ADMIN — METRONIDAZOLE 500 MG: 500 TABLET ORAL at 05:12

## 2018-12-17 RX ADMIN — METRONIDAZOLE 500 MG: 500 TABLET ORAL at 02:12

## 2018-12-17 RX ADMIN — INSULIN ASPART 4 UNITS: 100 INJECTION, SOLUTION INTRAVENOUS; SUBCUTANEOUS at 06:12

## 2018-12-17 RX ADMIN — ACETAMINOPHEN 650 MG: 325 TABLET ORAL at 07:12

## 2018-12-17 RX ADMIN — INSULIN ASPART 4 UNITS: 100 INJECTION, SOLUTION INTRAVENOUS; SUBCUTANEOUS at 01:12

## 2018-12-17 RX ADMIN — GABAPENTIN 100 MG: 100 CAPSULE ORAL at 09:12

## 2018-12-17 RX ADMIN — DABIGATRAN ETEXILATE MESYLATE 150 MG: 150 CAPSULE ORAL at 09:12

## 2018-12-17 RX ADMIN — ACETAMINOPHEN 1000 MG: 500 TABLET, FILM COATED ORAL at 05:12

## 2018-12-17 RX ADMIN — INSULIN ASPART 4 UNITS: 100 INJECTION, SOLUTION INTRAVENOUS; SUBCUTANEOUS at 09:12

## 2018-12-17 RX ADMIN — INSULIN ASPART 2 UNITS: 100 INJECTION, SOLUTION INTRAVENOUS; SUBCUTANEOUS at 01:12

## 2018-12-17 RX ADMIN — INSULIN ASPART 2 UNITS: 100 INJECTION, SOLUTION INTRAVENOUS; SUBCUTANEOUS at 09:12

## 2018-12-17 RX ADMIN — METRONIDAZOLE 500 MG: 500 TABLET ORAL at 09:12

## 2018-12-17 RX ADMIN — PRAVASTATIN SODIUM 40 MG: 40 TABLET ORAL at 09:12

## 2018-12-17 NOTE — PROGRESS NOTES
[facility] Hospital Medicine  Progress Note    Patient Name: Jai Godinez  MRN: [id number]  Patient Class: IP- Inpatient   Admission Date: 12/6/2018  Length of Stay: 11 days  Attending Physician: Citlali Bazan MD  Primary Care Provider: Pepe Valverde MD    [facility] Medicine Team: [facility] HOSP MED 5 Tremaine Merritt DO    Subjective:     Principal Problem:Amputated great toe of right foot    HPI:  This is a 78 year old female transferred from the [facility] for vascular surgery repair of right great toe status post amputation. He has a PMH of quadruple bypass (2001), severe peripheral artery disease, HFrEF (EF 45-50%), T2DM (A1c 8.1), HTN, SANDIE (on CPAP nightly) and diverticulosis.     He was hospitalized in [place] from 11/14 to 11/21 for acute on chronic heart failure exacerbation; discharge home with resolution of symptoms. He then developed one day of progressively worsening swelling of the right great toe associated with erythematous skin changes; for relief, he cut the skin of the toe with his pocket knife, at which point noticed purulent discharge, [facility]     Patient underwent right toe amputation done on 12/3 for pre-operative DANILO indicative of severe PAD. His post-operative course was complicated by development non-pulsatile blood flow after, as a result podiatry has left wound open. Vascular  at [facility] wanted to due angioplasty post-op, but CO2 angiography wasn't available. Also, concern for possible right foot osteomyelitis with wound cultures growing MSSA and Group G Streptococcus; blood cultures negative; ID there treated with Ceftriaxone while in-patient, with plans to discharge on Doxycycline.  In addition, on presentation, he was noted to have new development of Afib with slow ventricular response; rate control with Metoprolol and started on heparin drip while inpatient; transitioned to Dabigatran prior to transfer.     Patient/family requested transfer  elsewhere to accomplish procedure. [facility] Transfer center contacted today and case was discussed with Dr. Patricia Sweeney,  who requested patient to transfer to hospital medicine with vascular surgery consults.        On arrival here, he reports episodic cramping pain isolated to the amputation site occurring approximately Q5min. Pain relieved by taking Tylenol PRN. Denies fevers, chills, chest pain, SOB, palpitations, numbness of extremities.         Hospital Course:  12/07: No acute events overnight. Vascular surgery assessed this morning; recommended repeat DANILO and tentative angiogram today. Wound care and podiatry consulted for open wound and right foot osteomyelitis.  Wound care defering to podiatry. Podiatry repeating cultures and XR of foot. ID consulted for antibiotic recs; Ceftriaxone changed to Cefazolin (day #1). LR maintenance started to attempt optimization of kidney function; d/c home Lisinopril. [facility].   12/08: No events overnight. DANILO showing mild PAD. Scheduled for angiogram 12/12. BUN and Cr improved to 29 and 1.4 with maintenance LR; GFR 47. ESR 93, CRP 75. Day #2 of Cefazolin.   12/09: No events overnight. Day #3 of Cefazolin. Renal function same. Tylenol PRN changed to Gabapentin with pain improvement.   12/10: No events overnight. Day #4 of Cefazolin. Renal function same. Evaluated by Vascular; angiogram moved up to tomorrow. NPO at midnight.   12/11: No events overnight. Angiogram scheduled today. Day #5 of Cefazolin.   12/12: Angiogram yesterday concerning for no significant blood flow with collaterals below the right ankle. Vascular recommended hyperbaric oxygen as possible treatment modality, but not likely option given patient's insurance status. Discussed case with podiatry; will speak to vascular and discuss blood flow to area for possible amputation. No events overnight. Day #6 of Cefazolin.   12/13: No events overnight. Podiatry evaluated; planning for  [facility], with attempted treatment with IV antibiotics and wound vac, before amputation. PICC line placed today. Home health orders placed for attempted insurance coverage with [facility]; still waiting on answer. Day #7 of Cefazolin; per ID final recommendations will anticipate 6 week course of IV Cefazolin 2 g Q12H with Flagyl 500 mg Q8H with anticipated end date of 01/18/19 based on culture results from [facility].   12/15: No events overnight. Day #9 of Cefazolin. Pending IV antibiotic approval from [facility]. Heparin GTT discontinued; started on home Pradaxa. Added detemir to aspart insulin coverage. Labs now Q48H.   12/16: No events overnight. Day #10 of Cefazolin; Day #4 of Flagyl.   12/17/2018 No events overnight. Day #11 of Cefazolin, day #5 of Flagyl. Pt with no new complaints, pain well controlled. Anxious to leave pending [facility] approval.    Interval History:  As 12/17/2018 above.     Review of Systems   Constitutional: Negative for appetite change, chills, diaphoresis, fatigue and fever.   HENT: Negative for congestion, sore throat and trouble swallowing.    Eyes: Negative for photophobia, pain and discharge.   Respiratory: Negative for cough, chest tightness and shortness of breath.    Cardiovascular: Negative for chest pain, palpitations and leg swelling.   Gastrointestinal: Negative for abdominal pain, diarrhea, nausea and vomiting.   Genitourinary: Negative for decreased urine volume, difficulty urinating and urgency.   Musculoskeletal: Positive for gait problem. Negative for back pain, myalgias and neck pain.   Skin: Positive for wound. Negative for pallor and rash.   Neurological: Negative for dizziness, weakness, light-headedness, numbness and headaches.     Objective:     Vital Signs (Most Recent):  Temp: 97.7 °F (36.5 °C) (12/17/18 0837)  Pulse: 65 (12/17/18 0837)  Resp: 17 (12/17/18 0837)  BP: (!) 131/56 (12/17/18 0837)  SpO2: 95 % (12/17/18 0837) Vital Signs (24h Range):  Temp:   [97.3 °F (36.3 °C)-97.7 °F (36.5 °C)] 97.7 °F (36.5 °C)  Pulse:  [58-75] 65  Resp:  [17-18] 17  SpO2:  [95 %-96 %] 95 %  BP: (113-131)/(52-62) 131/56     Weight: 104.3 kg (230 lb)  Body mass index is 33.97 kg/m².    Intake/Output Summary (Last 24 hours) at 12/17/2018 1338  Last data filed at 12/17/2018 0600  Gross per 24 hour   Intake 500 ml   Output 1225 ml   Net -725 ml      Physical Exam   Constitutional: He is oriented to person, place, and time. He appears well-developed and well-nourished. No distress.   HENT:   Head: Normocephalic and atraumatic.   Mouth/Throat: Uvula is midline, oropharynx is clear and moist and mucous membranes are normal. Abnormal dentition.   Eyes: Conjunctivae are normal. Pupils are equal, round, and reactive to light.   Neck: No JVD present.   Cardiovascular: Normal rate and normal heart sounds. An irregularly irregular rhythm present.   No murmur heard.  Pulses:       Radial pulses are 2+ on the right side, and 2+ on the left side.        Dorsalis pedis pulses are 1+ on the right side, and 2+ on the left side.   Well-healed sternotomy scar in midline.    Pulmonary/Chest: Effort normal and breath sounds normal. No respiratory distress.   Abdominal: Soft. Normal appearance and bowel sounds are normal. He exhibits no distension. There is no tenderness.   Musculoskeletal:   Extensive 3 cm open wound on right foot near site of 1st toe amputation with exposure of underlying bones. No purulent discharge. Dressing clean, dry, intact.    Neurological: He is alert and oriented to person, place, and time.   Skin: Skin is warm and dry. No bruising and no rash noted.       Significant Labs:   CBC:   Recent Labs   Lab 12/17/18  0628   WBC 8.84   HGB 11.8*   HCT 39.2*        CMP:   Recent Labs   Lab 12/17/18  0628      K 4.4      CO2 25   *   BUN 19   CREATININE 1.4   CALCIUM 9.9   ANIONGAP 9   EGFRNONAA 47.4*       Significant Imaging: I have reviewed all pertinent imaging  results/findings within the past 24 hours.  I have reviewed and interpreted all pertinent imaging results/findings within the past 24 hours.    Assessment/Plan:      * Amputated great toe of right foot    · Status post amputation of first toe of right foot on 12/03 at [facility] secondary to either severe PAD with possible super-imposed osteomyelitis.   · [facility] at [facility] that post-[facility] course course was complicated by development non-pulsatile blood flow after, as a result podiatry has left wound open. Vascular at [facility] wanted to due angioplasty post-op, but CO2 angiography wasn't available, prompting transfer here. See vascular surgery and podiatry note for images of wound.   · Case discussed with Dr. Gomez in Vascular Surgery here at transfer. Vascular surgery evaluated; recommended repeating DANILO's and tentatively planning for angiogram. DANILO's ordered; only showing mild PAD. Angiogram on 12/11 concerning for no significant blood flow with collaterals below the right ankle; recommended hyperbaric O2 as treatment modality, but not likely option given patient's insurance status. Podiatry re-evaluated; planning on outpatient debridement at [facility] with attempted treatment with IV antibiotics and wound vac, before possible amputation. SW working on podiatry follow-up at [facility].   · Exam notable for diminished pulses in right foot and lower extremity. .   · On heparin drip at low intensity initially for anticoagulation with new Afib. Transitioned to home Pradaxa on 12/15.   · Wound care consulted; deferred care recommendations to podiatry.   · Tylenol PRN changed to Gabapentin 100 mg TID with improved control.   · PT/OT ordered.        Open wound of right foot    See assessment for right great toe amputation and right foot osteomyelitis.        Stage 3 chronic kidney disease    · Improved.   · Unclear of baseline; no outside records available. Main hinderance preventing patient from receiving regular angiogram vs with CO2.   · Could  be acute, but patient with history of poorly controlled T2DM.   · BUN 37, Cr 1.6, and GFR 40 on admission.    · Held home Lisinopril in setting of possible STACY, and discontinued secondary to side effects.   · BMP Q48H.           Osteomyelitis of right foot    · Concern for possible right foot osteomyelitis with wound cultures growing MSSA and Group G Streptococcus; blood cultures negative; ID there treated with Ceftriaxone while in-patient, with plans to discharge on Doxycycline. [facility].   · Status post debridement by podiatry at [facility] prior to transfer.   · Unclear if patient had imaging studies suggesting or confirming.   · Afebrile. No leukocytosis. ESR 75. CRP 93.    · Blood cultures 12/06 showed no growth after 5 days.   · ID consulted for recommendations regarding correct choice of antibiotic at [facility] and anticipated stop date of course. Status post one day of Ceftriaxone. Day #10 of Cefazolin per ID recs; ID anticipates 6 weeks of IV ABX as with exposed bone. PICC line placed. Flagyl added on 12/13.   · Podiatry consulted; wound cultures ordered. Wound to be dressed with Betadyne and Kerflex for now. Recommending wound vac at discharge.   · Podiatry evaluated; plan is for outpatient debridement at [facility] possibly next week. Wife reports [facility] notified her that podiatry follow-up will be outsourced according to insurance provider.     ID discharge recommendations are as follows:   1.  Continue cefazolin 2 grams IV Q12 hours (renally dosed.  Discussed with ID PharmD).  2.  Add flagyl 500 mg orally Q8 hours for anaerobic coverage   3.  Anticipate 6 weeks of IV abx given with exposed bone.  Estimated end date 1/18/19  4.  Weekly cbc, cmp, crp, esr.  Until re-established with [facility] ID, fax results to ID at [phone]  5.  Will need ID follow up at [facility]       Atrial fibrillation with slow ventricular response    · Irregular irregular rhythm on exam with HR controlled in lower 60's.   · Will  hold home Metoprolol  12.5 mg given occasional bradycardia with max HR in past 24hrs at 75. If pt becomes tachycardic, will restart  with parameters to hold for HR <65.   · Started on heparin infusion at [facility] with plans to transition with Dabigatran.   · INR 1.2 on admission.   · Started heparin infusion here with low intensity initially and followed aPTT until therapeutic.  · [facility] records mentioned plan to discharge patient on Dabigatran based on approved coverage. Transitioned to Dabigatran on 12/15 for anticipated discharge.   · Patient has outpatient follow-up with cardiology at [facility] on 12/21/18.        Chronic systolic heart failure    · Last EF 40-50% at [facility] per transfer note.   · No signs of fluid overload on exam.   · Managed with Lasix 40 mg BID, Lisinopril 40 mg, and Metoprolol daily.   · Holding Metoprolol temporarily with HR on admission in lower 60's and Lisinopril with possible STACY.  Lisinopril should be changed to ARB on discharge given history of chronic dry cough and worsening systemic rash after Lisinopril dose was increased some months ago.  · Will order cardiac and diabetic diet.         Essential hypertension    · Controlled.   · Resuming Lasix, and holding Metoprolol with HR in lower 60's.   · Discontinued home Lisinopril because of concern for possible acute on chronic STACY, but patient and wife reports history of dry cough and worsening systemic erythematous rash after Lisinopril dose was increased some months ago. Will enter as allergy in patient's chart.   · Will likely discharge on ARB instead of ACE-I.        Type 2 diabetes mellitus, without long-term current use of insulin    · Last A1c 8 at outside facility.   · Holding home Metformin and Glipizide.  · Added Aspart and Detemir for basal and prandial coverage. Detemir increased to 7u BID   · Low dose sliding scale ordered on admission.          Peripheral arterial disease    See assessment for amputation of great right toe.          VTE  Risk Mitigation (From admission, onward)        Ordered     dabigatran etexilate capsule 150 mg  2 times daily      12/15/18 1010     heparin, porcine (PF) 100 unit/mL injection flush 10 Units  As needed (PRN)      12/06/18 2119     IP VTE HIGH RISK PATIENT  Once      12/06/18 1859              Tremaine Merritt DO  Department of Hospital Medicine   [facility]

## 2018-12-17 NOTE — ASSESSMENT & PLAN NOTE
· Last A1c 8 at outside facility.   · Holding home Metformin and Glipizide.  · Added Aspart and Detemir for basal and prandial coverage. Detemir increased to 7u BID   · Low dose sliding scale ordered on admission.

## 2018-12-17 NOTE — PLAN OF CARE
Problem: Patient Care Overview  Goal: Plan of Care Review  Outcome: Ongoing (interventions implemented as appropriate)  No significant events today. Pt continues to need insulin coverage throughout the day but otherwise, pt is stable. Podiatry came to disconnect wound vac from pt's foot. Also podiatry communicated that there will be a nursing communication about daily wound care dressings on pt's right foot on daily basis and could be found in orders.  Otherwise no other concerns.

## 2018-12-17 NOTE — ASSESSMENT & PLAN NOTE
· Irregular irregular rhythm on exam with HR controlled in lower 60's.   · Will hold home Metoprolol  12.5 mg given occasional bradycardia with max HR in past 24hrs at 75. If pt becomes tachycardic, will restart  with parameters to hold for HR <65.   · Started on heparin infusion at [facility] with plans to transition with Dabigatran.   · INR 1.2 on admission.   · Started heparin infusion here with low intensity initially and followed aPTT until therapeutic.  · [facility] records mentioned plan to discharge patient on Dabigatran based on approved coverage. Transitioned to Dabigatran on 12/15 for anticipated discharge.   · Patient has outpatient follow-up with cardiology at [facility] on 12/21/18.

## 2018-12-17 NOTE — ASSESSMENT & PLAN NOTE
· Last EF 40-50% at [facility] per transfer note.   · No signs of fluid overload on exam.   · Managed with Lasix 40 mg BID, Lisinopril 40 mg, and Metoprolol daily.   · Holding Metoprolol temporarily with HR on admission in lower 60's and Lisinopril with possible STACY.  Lisinopril should be changed to ARB on discharge given history of chronic dry cough and worsening systemic rash after Lisinopril dose was increased some months ago.  · Will order cardiac and diabetic diet.

## 2018-12-17 NOTE — ASSESSMENT & PLAN NOTE
· Concern for possible right foot osteomyelitis with wound cultures growing MSSA and Group G Streptococcus; blood cultures negative; ID there treated with Ceftriaxone while in-patient, with plans to discharge on Doxycycline. [facility]   · Status post debridement by podiatry at [facility] prior to transfer.   · Unclear if patient had imaging studies suggesting or confirming.   · Afebrile. No leukocytosis. ESR 75. CRP 93.    · Blood cultures 12/06 showed no growth after 5 days.   · ID consulted for recommendations regarding correct choice of antibiotic at [facility] and anticipated stop date of course. Status post one day of Ceftriaxone. Day #10 of Cefazolin per ID recs; ID anticipates 6 weeks of IV ABX as with exposed bone. PICC line placed. Flagyl added on 12/13.   · Podiatry consulted; wound cultures ordered. Wound to be dressed with Betadyne and Kerflex for now. Recommending wound vac at discharge.   · Podiatry evaluated; plan is for outpatient debridement at [facility] possibly next week. Wife reports [facility] notified her that podiatry follow-up will be outsourced according to insurance provider.     ID discharge recommendations are as follows:   1.  Continue cefazolin 2 grams IV Q12 hours (renally dosed.  Discussed with ID PharmD).  2.  Add flagyl 500 mg orally Q8 hours for anaerobic coverage   3.  Anticipate 6 weeks of IV abx given with exposed bone.  Estimated end date 1/18/19  4.  Weekly cbc, cmp, crp, esr.  Until re-established with [facility] ID, fax results to ID at [phone]  5.  Will need ID follow up at [facility]

## 2018-12-17 NOTE — PLAN OF CARE
Problem: Patient Care Overview  Goal: Plan of Care Review  Outcome: Ongoing (interventions implemented as appropriate)  BS checked as ordered, insulin administered as ordered, dressing to R foot intact, able to make needs known, no distress noted, will continue to monitor.    Problem: Diabetes Comorbidity  Goal: Blood Glucose Level Within Desired Range  Outcome: Ongoing (interventions implemented as appropriate)  Intervention: Maintain Glycemic Control   12/17/18 1730   Monitor and Manage Ketoacidosis   Glycemic Management blood glucose monitoring;supplemental insulin given

## 2018-12-17 NOTE — ASSESSMENT & PLAN NOTE
· Status post amputation of first toe of right foot on 12/03 at [facility] secondary to either severe PAD with possible super-imposed osteomyelitis.   · [facility] was complicated by development non-pulsatile blood flow after, as a result podiatry has left wound open. Vascular at [facility] wanted to due angioplasty post-op, but CO2 angiography wasn't available, prompting transfer here. See vascular surgery and podiatry note for images of wound.   · Case discussed with Dr. Gomez in Vascular Surgery here at transfer. Vascular surgery evaluated; recommended repeating DANILO's and tentatively planning for angiogram. DANILO's ordered; only showing mild PAD. Angiogram on 12/11 concerning for no significant blood flow with collaterals below the right ankle; recommended hyperbaric O2 as treatment modality, but not likely option given patient's insurance status. Podiatry re-evaluated; planning on outpatient debridement at [facility] with attempted treatment with IV antibiotics and wound vac, before possible amputation. SW working on podiatry follow-up at [facility].   · Exam notable for diminished pulses in right foot and lower extremity. .   · On heparin drip at low intensity initially for anticoagulation with new Afib. Transitioned to home Pradaxa on 12/15.   · Wound care consulted; deferred care recommendations to podiatry.   · Tylenol PRN changed to Gabapentin 100 mg TID with improved control.   · PT/OT ordered.

## 2018-12-17 NOTE — PLAN OF CARE
Problem: Patient Care Overview  Goal: Plan of Care Review  Outcome: Ongoing (interventions implemented as appropriate)   12/17/18 0817   Plan of Care Review   Plan of Care Reviewed With patient

## 2018-12-18 VITALS
WEIGHT: 230 LBS | BODY MASS INDEX: 34.07 KG/M2 | HEIGHT: 69 IN | DIASTOLIC BLOOD PRESSURE: 66 MMHG | RESPIRATION RATE: 20 BRPM | TEMPERATURE: 98 F | OXYGEN SATURATION: 95 % | SYSTOLIC BLOOD PRESSURE: 128 MMHG | HEART RATE: 63 BPM

## 2018-12-18 LAB
POCT GLUCOSE: 254 MG/DL (ref 70–110)
POCT GLUCOSE: 305 MG/DL (ref 70–110)

## 2018-12-18 PROCEDURE — 25000003 PHARM REV CODE 250: Performed by: STUDENT IN AN ORGANIZED HEALTH CARE EDUCATION/TRAINING PROGRAM

## 2018-12-18 PROCEDURE — 25000003 PHARM REV CODE 250: Performed by: NURSE PRACTITIONER

## 2018-12-18 PROCEDURE — 63600175 PHARM REV CODE 636 W HCPCS: Performed by: NURSE PRACTITIONER

## 2018-12-18 PROCEDURE — 99238 HOSP IP/OBS DSCHRG MGMT 30/<: CPT | Mod: GC,,, | Performed by: HOSPITALIST

## 2018-12-18 RX ORDER — PRAVASTATIN SODIUM 40 MG/1
40 TABLET ORAL DAILY
Qty: 30 TABLET | Refills: 0 | Status: SHIPPED | OUTPATIENT
Start: 2018-12-19 | End: 2019-12-19

## 2018-12-18 RX ORDER — LOSARTAN POTASSIUM 50 MG/1
50 TABLET ORAL DAILY
Qty: 30 TABLET | Refills: 0 | Status: SHIPPED | OUTPATIENT
Start: 2018-12-18 | End: 2019-12-18

## 2018-12-18 RX ADMIN — METRONIDAZOLE 500 MG: 500 TABLET ORAL at 05:12

## 2018-12-18 RX ADMIN — DABIGATRAN ETEXILATE MESYLATE 150 MG: 150 CAPSULE ORAL at 09:12

## 2018-12-18 RX ADMIN — GABAPENTIN 100 MG: 100 CAPSULE ORAL at 09:12

## 2018-12-18 RX ADMIN — METRONIDAZOLE 500 MG: 500 TABLET ORAL at 02:12

## 2018-12-18 RX ADMIN — ACETAMINOPHEN 1000 MG: 500 TABLET, FILM COATED ORAL at 05:12

## 2018-12-18 RX ADMIN — INSULIN ASPART 4 UNITS: 100 INJECTION, SOLUTION INTRAVENOUS; SUBCUTANEOUS at 01:12

## 2018-12-18 RX ADMIN — CEFAZOLIN 2 G: 1 INJECTION, POWDER, FOR SOLUTION INTRAMUSCULAR; INTRAVENOUS at 06:12

## 2018-12-18 RX ADMIN — INSULIN ASPART 4 UNITS: 100 INJECTION, SOLUTION INTRAVENOUS; SUBCUTANEOUS at 09:12

## 2018-12-18 RX ADMIN — PRAVASTATIN SODIUM 40 MG: 40 TABLET ORAL at 09:12

## 2018-12-18 RX ADMIN — COLLAGENASE SANTYL: 250 OINTMENT TOPICAL at 01:12

## 2018-12-18 RX ADMIN — FUROSEMIDE 40 MG: 40 TABLET ORAL at 09:12

## 2018-12-18 RX ADMIN — INSULIN ASPART 3 UNITS: 100 INJECTION, SOLUTION INTRAVENOUS; SUBCUTANEOUS at 09:12

## 2018-12-18 RX ADMIN — CEFAZOLIN 2 G: 1 INJECTION, POWDER, FOR SOLUTION INTRAMUSCULAR; INTRAVENOUS at 05:12

## 2018-12-18 RX ADMIN — ACETAMINOPHEN 1000 MG: 500 TABLET, FILM COATED ORAL at 04:12

## 2018-12-18 NOTE — PLAN OF CARE
Problem: Patient Care Overview  Goal: Plan of Care Review  Outcome: Ongoing (interventions implemented as appropriate)  Patient AAOx4, ambulates independently.  Patient reports no pain or discomfort.  Patient initiated and maintained home CPAP independently.  Wound care to right foot done at 1900 by day shift.  Wife remains at bedside.  No major events this shift.  Will continue to monitor.

## 2018-12-18 NOTE — PLAN OF CARE
HANG spoke with [facility] Rogel [phone] opt.1 Ext. [phone], who reported she contacted [employer] for the patient IV Antibotics and they are scheduled to come out to see the patient. Per [facility], the patient's HH is with [facility] and the [facility] will assist the patient is getting an earlier appointment with the a podiatrist closer to where he resides because [facility].     HANG contacted [employer] and spoke with Vilma [phone], who reported that she or another representative will be by to meet with the patient today.     Millie Mayer LMSW   - [facility]  Ext.[phone]

## 2018-12-18 NOTE — PLAN OF CARE
SW faxed referral to [facility] via  for review. HANG will continue to follow.      12/18/18 1612   Post-Acute Status   Post-Acute Authorization Home Health/Hospice   Post-Acute Placement Status Referrals Sent     Millie Mayer LMSW   - [facility]  Ext.[phone]

## 2018-12-18 NOTE — PLAN OF CARE
HANG spoke with Molina with [employer], who reported he met with the patient today and received insurance auth as well. Per Molina, he is going to teach the patient and will wait until deleivery is made and the patient will be ready for d/c. SW will update the patient's medical team. HANG will continue to follow.      12/18/18 3870   Post-Acute Status   Post-Acute Authorization Placement   Post-Acute Placement Status Authorization Obtained     Millie Mayer LMSW   - [facility]  Ext.[phone]

## 2018-12-18 NOTE — PLAN OF CARE
Called Dr. Sharif Epps @ [phone] ID @ [facility]. Message left to return my call. Purpose of call was to review pt's medical information with her for f/u with [facility] upon d/c.

## 2018-12-18 NOTE — PLAN OF CARE
[facility]    HOME HEALTH ORDERS  FACE TO FACE ENCOUNTER    Patient Name: Jai Godinez  YOB: 1940    PCP: Pepe Valverde MD   PCP Address: [address] / [place] [address]  PCP Phone Number: [phone]  PCP Fax: [phone]    Encounter Date: 12/18/2018    Admit to Home Health    Diagnoses:  Active Hospital Problems    Diagnosis  POA    *Amputated great toe of right foot [Z89.411]  Not Applicable    Wound of right foot [S91.301A]  Yes    Stage 3 chronic kidney disease [N18.3]  Yes    Open wound of right foot [S91.301A]  Yes    Peripheral arterial disease [I73.9]  Yes    Type 2 diabetes mellitus, without long-term current use of insulin [E11.9]  Yes    Essential hypertension [I10]  Yes    Chronic systolic heart failure [I50.22]  Yes    Atrial fibrillation with slow ventricular response [I48.91]  Yes    Osteomyelitis of right foot [M86.9]  Yes      Resolved Hospital Problems   No resolved problems to display.       No future appointments.        I have seen and examined this patient face to face today. My clinical findings that support the need for the home health skilled services and home bound status are the following:  Medical restrictions requiring assistance of another human to leave home due to  Unstable ambulation and IV infusion Needs.    Allergies:Review of patient's allergies indicates:  No Known Allergies    Diet: cardiac diet    Activities: activity as tolerated    Nursing:   SN to complete comprehensive assessment including routine vital signs. Instruct on disease process and s/s of complications to report to MD. Review/verify medication list sent home with the patient at time of discharge  and instruct patient/caregiver as needed. Frequency may be adjusted depending on start of care date.    Notify MD if SBP > 160 or < 90; DBP > 90 or < 50; HR > 120 or < 50; Temp > 101; Other:         CONSULTS:    Physical Therapy to evaluate and treat. Evaluate for  home safety and equipment needs; Establish/upgrade home exercise program. Perform / instruct on therapeutic exercises, gait training, transfer training, and Range of Motion.  Occupational Therapy to evaluate and treat. Evaluate home environment for safety and equipment needs. Perform/Instruct on transfers, ADL training, ROM, and therapeutic exercises.    MISCELLANEOUS CARE:  Labs: Weekly cbc, cmp, crp, esr.  Until re-established with [facility] ID, fax results to ID at [phone]  Home Infusion Therapy:   SN to perform Infusion Therapy/Central Line Care.  Review Central Line Care & Central Line Flush with patient.    Administer (drug and dose): Cefazolin 2000mg IV every 12 hours  Last dose given: 12/18/18 at 06:30                          Home dose due: 12/18/18 at 18:30  Final dose due: 02/10/2019    Scrub the Hub: Prior to accessing the line, always perform a 30 second alcohol scrub  Each lumen of the central line is to be flushed at least daily with 10 mL Normal Saline and 3 mL Heparin flush (10 units/mL)  Skilled Nurse (SN) may draw blood from IV access  Blood Draw Procedure:   - Aspirate at least 5 mL of blood   - Discard   - Obtain specimen   - Change injection cap   - Flush with 20 mL Normal Saline followed by a                 3-5 mL Heparin flush (10 units/mL)  Central :   - Sterile dressing changes are done weekly and as needed.   - Use chlor-hexadine scrub to cleanse site, apply Biopatch to insertion site,       apply securement device dressing   - Injection caps are changed weekly and after EVERY lab draw.   - If sterile gauze is under dressing to control oozing,                 dressing change must be performed every 24 hours until gauze is not needed.        Medications: Review discharge medications with patient and family and provide education.      Current Discharge Medication List      START taking these medications    Details   ceFAZolin (ANCEF) 1 gram injection Inject 2,000 mg (2 g  "total) into the vein every 12 (twelve) hours.  Qty: 609502 mg, Refills: 1      gabapentin (NEURONTIN) 100 MG capsule Take 1 capsule (100 mg total) by mouth 2 (two) times daily.  Qty: 60 capsule, Refills: 11      losartan (COZAAR) 50 MG tablet Take 1 tablet (50 mg total) by mouth once daily.  Qty: 30 tablet, Refills: 0         CONTINUE these medications which have CHANGED    Details   pravastatin (PRAVACHOL) 40 MG tablet Take 1 tablet (40 mg total) by mouth once daily.  Qty: 30 tablet, Refills: 0         CONTINUE these medications which have NOT CHANGED    Details   aspirin (ECOTRIN) 81 MG EC tablet Take 81 mg by mouth.      dabigatran etexilate (PRADAXA) 150 mg Cap Take 150 mg by mouth once daily. "Do NOT break, chew, or open capsules."      furosemide (LASIX) 40 MG tablet Take 40 mg by mouth 2 (two) times daily.      glipiZIDE (GLUCOTROL) 10 MG tablet Take 10 mg by mouth every evening.       metFORMIN (GLUCOPHAGE) 500 MG tablet Take 1,000 mg by mouth daily with breakfast.       metoprolol succinate (TOPROL-XL) 25 MG 24 hr tablet Take 25 mg by mouth once daily.         STOP taking these medications       lisinopril 10 MG tablet Comments:   Reason for Stopping:               I certify that this patient is confined to his home and needs intermittent skilled nursing care, physical therapy and occupational therapy.      "

## 2018-12-19 LAB — POCT GLUCOSE: 174 MG/DL (ref 70–110)

## 2018-12-19 NOTE — ASSESSMENT & PLAN NOTE
· Irregular irregular rhythm on exam with HR controlled in lower 60's.   · Will hold home Metoprolol  12.5 mg given occasional bradycardia with max HR in past 24hrs at 75. If pt becomes tachycardic, will restart  with parameters to hold for HR <65.   · Started on heparin infusion at [facility] with plans to transition with Dabigatran.   · INR 1.2 on admission.   · Started heparin infusion here with low intensity initially and followed aPTT until therapeutic.  · [facility] records mentioned plan to discharge patient on Dabigatran based on approved coverage. Transitioned to Dabigatran on 12/15 for anticipated discharge.   · Patient has outpatient follow-up with cardiology at [facility] on 12/21/18.

## 2018-12-19 NOTE — NURSING
Molina with [employer] educated patient and wife on IV antibiotic administration and antibiotics have been delivered to bedside.  D/C instructions reviewed with patient and wife, questions answered and copy of instructions given to patient.  Both verbalized understanding of instructions.  Waiting on w/c transport to bring to vehicle.

## 2018-12-19 NOTE — ASSESSMENT & PLAN NOTE
· Last EF 40-50% at [facility] per transfer note.   · No signs of fluid overload on exam.   · Managed with Lasix 40 mg BID, Lisinopril 40 mg, and Metoprolol daily.   · Holding Metoprolol temporarily with HR on admission in lower 60's and Lisinopril with possible STACY.  Lisinopril changed to ARB on discharge given history of chronic dry cough and worsening systemic rash after Lisinopril dose was increased some months ago.  · Will order cardiac and diabetic diet.

## 2018-12-19 NOTE — ASSESSMENT & PLAN NOTE
· Controlled.   · Resuming Lasix, and holding Metoprolol with HR in lower 60's.   · Discontinued home Lisinopril because of concern for possible acute on chronic STACY, but patient and wife reports history of dry cough and worsening systemic erythematous rash after Lisinopril dose was increased some months ago. Will enter as allergy in patient's chart.   · Will discharge on ARB instead of ACE-I.

## 2018-12-19 NOTE — ASSESSMENT & PLAN NOTE
· Status post amputation of first toe of right foot on 12/03 at [facility] secondary to either severe PAD with possible super-imposed osteomyelitis.   · [facility] course was complicated by development non-pulsatile blood flow after, as a result podiatry has left wound open. Vascular at [facility] wanted to due angioplasty post-op, but CO2 angiography wasn't available, prompting transfer here. See vascular surgery and podiatry note for images of wound.   · Case discussed with Dr. Gomez in Vascular Surgery here at transfer. Vascular surgery evaluated; recommended repeating DANILO's and tentatively planning for angiogram. DANILO's ordered; only showing mild PAD. Angiogram on 12/11 concerning for no significant blood flow with collaterals below the right ankle; recommended hyperbaric O2 as treatment modality, but not likely option given patient's insurance status. Podiatry re-evaluated; planning on outpatient debridement at [facility] with attempted treatment with IV antibiotics and wound vac, before possible amputation. SW working on podiatry follow-up at [facility].   · Exam notable for diminished pulses in right foot and lower extremity. .   · On heparin drip at low intensity initially for anticoagulation with new Afib. Transitioned to home Pradaxa on 12/15.   · Wound care consulted; deferred care recommendations to podiatry.   · Tylenol PRN changed to Gabapentin 100 mg TID with improved control.   · PT/OT ordered.

## 2018-12-19 NOTE — DISCHARGE SUMMARY
[facility] Hospital Medicine  Discharge Summary      Patient Name: Jai Godinez  MRN: [id number]  Admission Date: 12/6/2018  Hospital Length of Stay: 12 days  Discharge Date and Time: 12/18/2018  7:20 PM  Attending Physician: No att. providers found   Discharging Provider: Tremaine Merritt DO  Primary Care Provider: Pepe Valverde MD    [facility] Medicine Team: [facility] MED 5 Tremaine Merritt DO    HPI: This is a 78 year old male transferred from the [facility] for vascular surgery repair of right great toe status post amputation. He has a PMH of quadruple bypass (2001), severe peripheral artery disease, HFrEF (EF 45-50%), T2DM (A1c 8.1), HTN, SANDIE (on CPAP nightly) and diverticulosis.      He was hospitalized in [place] from 11/14 to 11/21 for acute on chronic heart failure exacerbation; discharge home with resolution of symptoms. He then developed one day of progressively worsening swelling of the right great toe associated with erythematous skin changes; for relief, he cut the skin of the toe with his pocket knife, at which point noticed purulent discharge, [facility]      Patient underwent right toe amputation done on 12/3 for pre-operative DANILO indicative of severe PAD. His post-operative course was complicated by development non-pulsatile blood flow after, as a result podiatry has left wound open. Vascular  at [facility] wanted to due angioplasty post-op, but CO2 angiography wasn't available. Also, concern for possible right foot osteomyelitis with wound cultures growing MSSA and Group G Streptococcus; blood cultures negative; ID there treated with Ceftriaxone while in-patient, with plans to discharge on Doxycycline.  In addition, on presentation, he was noted to have new development of Afib with slow ventricular response; rate control with Metoprolol and started on heparin drip while inpatient; transitioned to Dabigatran prior to transfer.      Patient/family requested transfer  elsewhere to accomplish procedure. [facility] Transfer center contacted today and case was discussed with Dr. Patricia Sweeney,  who requested patient to transfer to hospital medicine with vascular surgery consults.        On arrival here, he reports episodic cramping pain isolated to the amputation site occurring approximately Q5min. Pain relieved by taking Tylenol PRN. Denies fevers, chills, chest pain, SOB, palpitations, numbness of extremities.         Procedure(s) (LRB):  Angiogram Extremity Unilateral - L CFA access, RLE angio with CO2 contrast (N/A)      Hospital Course: 12/07: No acute events overnight. Vascular surgery assessed this morning; recommended repeat DANILO and tentative angiogram today. Wound care and podiatry consulted for open wound and right foot osteomyelitis.  Wound care defering to podiatry. Podiatry repeating cultures and XR of foot. ID consulted for antibiotic recs; Ceftriaxone changed to Cefazolin (day #1). LR maintenance started to attempt optimization of kidney function; d/c home Lisinopril. [facility].   12/08: No events overnight. DANILO showing mild PAD. Scheduled for angiogram 12/12. BUN and Cr improved to 29 and 1.4 with maintenance LR; GFR 47. ESR 93, CRP 75. Day #2 of Cefazolin.   12/09: No events overnight. Day #3 of Cefazolin. Renal function same. Tylenol PRN changed to Gabapentin with pain improvement.   12/10: No events overnight. Day #4 of Cefazolin. Renal function same. Evaluated by Vascular; angiogram moved up to tomorrow. NPO at midnight.   12/11: No events overnight. Angiogram scheduled today. Day #5 of Cefazolin.   12/12: Angiogram yesterday concerning for no significant blood flow with collaterals below the right ankle. Vascular recommended hyperbaric oxygen as possible treatment modality, but not likely option given patient's insurance status. Discussed case with podiatry; will speak to vascular and discuss blood flow to area for possible amputation. No  events overnight. Day #6 of Cefazolin.   12/13: No events overnight. Podiatry evaluated; planning [facility], with attempted treatment with IV antibiotics and wound vac, before amputation. PICC line placed today. Home health orders placed for attempted insurance coverage with [facility]; still waiting on answer. Day #7 of Cefazolin; per ID final recommendations will anticipate 6 week course of IV Cefazolin 2 g Q12H with Flagyl 500 mg Q8H with anticipated end date of 01/18/19 based on culture results from [facility].   12/15: No events overnight. Day #9 of Cefazolin. Pending IV antibiotic approval from [facility]. Heparin GTT discontinued; started on home Pradaxa. Added detemir to aspart insulin coverage. Labs now Q48H.   12/16: No events overnight. Day #10 of Cefazolin; Day #4 of Flagyl.   12/17/2018 No events overnight. Day #11 of Cefazolin, day #5 of Flagyl. Pt with no new complaints, pain well controlled. Anxious to leave pending [facility] approval.  12/18/2018 No events overnight. Day #12 of Cefazolin, Day #6 of flagyl. Home health approved through pt insurance, discharging pending delivery of antibiotics.        Consults:   Consults (From admission, onward)        Status Ordering Provider     Inpatient consult to Infectious Diseases  Once     Provider:  (Not yet assigned)    THIAGO Greer     Inpatient consult to PICC team ([facility])  Once     Provider:  (Not yet assigned)    Completed THIAGO GUZMAN     Inpatient consult to Podiatry  Once     Provider:  (Not yet assigned)    Completed THIAGO GUZMAN     Inpatient consult to Vascular Surgery  Once     Provider:  (Not yet assigned)    Completed THIAGO GUZMAN          Final Active Diagnoses:    Diagnosis Date Noted POA    PRINCIPAL PROBLEM:  Amputated great toe of right foot [Z89.411] 12/06/2018 Not Applicable    Wound of right foot [S91.301A] 12/08/2018 Yes    Stage 3 chronic kidney disease [N18.3] 12/07/2018 Yes  "   Open wound of right foot [S91.301A] 12/07/2018 Yes    Peripheral arterial disease [I73.9] 12/06/2018 Yes    Type 2 diabetes mellitus, without long-term current use of insulin [E11.9] 12/06/2018 Yes    Essential hypertension [I10] 12/06/2018 Yes    Chronic systolic heart failure [I50.22] 12/06/2018 Yes    Atrial fibrillation with slow ventricular response [I48.91] 12/06/2018 Yes    Osteomyelitis of right foot [M86.9] 12/06/2018 Yes      Problems Resolved During this Admission:      Discharged Condition: fair    Disposition: Home or Self Care    Follow Up: Follow up with [facility] as scheduled    Patient Instructions:   No discharge procedures on file.  Medications:  Reconciled Home Medications:      Medication List      START taking these medications    ceFAZolin 1 gram injection  Commonly known as:  ANCEF  Inject 2,000 mg (2 g total) into the vein every 12 (twelve) hours.     gabapentin 100 MG capsule  Commonly known as:  NEURONTIN  Take 1 capsule (100 mg total) by mouth 2 (two) times daily.     losartan 50 MG tablet  Commonly known as:  COZAAR  Take 1 tablet (50 mg total) by mouth once daily.        CHANGE how you take these medications    pravastatin 40 MG tablet  Commonly known as:  PRAVACHOL  Take 1 tablet (40 mg total) by mouth once daily.  Start taking on:  12/19/2018  What changed:    · medication strength  · how much to take        CONTINUE taking these medications    aspirin 81 MG EC tablet  Commonly known as:  ECOTRIN  Take 81 mg by mouth.     dabigatran etexilate 150 mg Cap  Commonly known as:  PRADAXA  Take 150 mg by mouth once daily. "Do NOT break, chew, or open capsules."     furosemide 40 MG tablet  Commonly known as:  LASIX  Take 40 mg by mouth 2 (two) times daily.     glipiZIDE 10 MG tablet  Commonly known as:  GLUCOTROL  Take 10 mg by mouth every evening.     metFORMIN 500 MG tablet  Commonly known as:  GLUCOPHAGE  Take 1,000 mg by mouth daily with breakfast.     metoprolol succinate 25 MG 24 " hr tablet  Commonly known as:  TOPROL-XL  Take 25 mg by mouth once daily.        STOP taking these medications    lisinopril 10 MG tablet            Significant Diagnostic Studies: Labs:   CMP   Recent Labs   Lab 12/17/18  0628      K 4.4      CO2 25   *   BUN 19   CREATININE 1.4   CALCIUM 9.9   ANIONGAP 9   ESTGFRAFRICA 54.9*   EGFRNONAA 47.4*   , CBC   Recent Labs   Lab 12/17/18  0628   WBC 8.84   HGB 11.8*   HCT 39.2*          Pending Diagnostic Studies:     Procedure Component Value Units Date/Time    Hemoglobin A1c [[id number]]     Order Status:  Sent Lab Status:  No result     Specimen:  Blood         Indwelling Lines/Drains at time of discharge:   Lines/Drains/Airways     Peripherally Inserted Central Catheter Line                 PICC Double Lumen 12/13/18 1032 right basilic 5 days          Pressure Ulcer                 Negative Pressure Wound Therapy  5 days                Time spent on the discharge of patient: 45 minutes  Patient was seen and examined on the date of discharge and determined to be suitable for discharge.         Tremaine Merritt DO  Department of Hospital Medicine  [facility]

## 2018-12-19 NOTE — SUBJECTIVE & OBJECTIVE
Interval History:  As 12/18/2018 above.     Review of Systems   Constitutional: Negative for appetite change, chills, diaphoresis, fatigue and fever.   HENT: Negative for congestion, sore throat and trouble swallowing.    Eyes: Negative for photophobia, pain and discharge.   Respiratory: Negative for cough, chest tightness and shortness of breath.    Cardiovascular: Negative for chest pain, palpitations and leg swelling.   Gastrointestinal: Negative for abdominal pain, diarrhea, nausea and vomiting.   Genitourinary: Negative for decreased urine volume, difficulty urinating and urgency.   Musculoskeletal: Positive for gait problem. Negative for back pain, myalgias and neck pain.   Skin: Positive for wound. Negative for pallor and rash.   Neurological: Negative for dizziness, weakness, light-headedness, numbness and headaches.     Objective:     Vital Signs (Most Recent):  Temp: 98 °F (36.7 °C) (12/18/18 1536)  Pulse: 63 (12/18/18 1536)  Resp: 20 (12/18/18 1536)  BP: 128/66 (12/18/18 1536)  SpO2: 95 % (12/18/18 1536) Vital Signs (24h Range):  Temp:  [97.3 °F (36.3 °C)-98.1 °F (36.7 °C)] 98 °F (36.7 °C)  Pulse:  [63-73] 63  Resp:  [14-20] 20  SpO2:  [94 %-97 %] 95 %  BP: (123-136)/(58-80) 128/66     Weight: 104.3 kg (230 lb)  Body mass index is 33.97 kg/m².  No intake or output data in the 24 hours ending 12/18/18 1856   Physical Exam   Constitutional: He is oriented to person, place, and time. He appears well-developed and well-nourished. No distress.   HENT:   Head: Normocephalic and atraumatic.   Mouth/Throat: Uvula is midline, oropharynx is clear and moist and mucous membranes are normal. Abnormal dentition.   Eyes: Conjunctivae are normal. Pupils are equal, round, and reactive to light.   Neck: No JVD present.   Cardiovascular: Normal rate and normal heart sounds. An irregularly irregular rhythm present.   No murmur heard.  Pulses:       Radial pulses are 2+ on the right side, and 2+ on the left side.         Dorsalis pedis pulses are 1+ on the right side, and 2+ on the left side.   Well-healed sternotomy scar in midline.    Pulmonary/Chest: Effort normal and breath sounds normal. No respiratory distress.   Abdominal: Soft. Normal appearance and bowel sounds are normal. He exhibits no distension. There is no tenderness.   Musculoskeletal:   Extensive 3 cm open wound on right foot near site of 1st toe amputation with exposure of underlying bones. No purulent discharge. Dressing clean, dry, intact.    Neurological: He is alert and oriented to person, place, and time.   Skin: Skin is warm and dry. No bruising and no rash noted.       Significant Labs:   CBC:   Recent Labs   Lab 12/17/18  0628   WBC 8.84   HGB 11.8*   HCT 39.2*        CMP:   Recent Labs   Lab 12/17/18  0628      K 4.4      CO2 25   *   BUN 19   CREATININE 1.4   CALCIUM 9.9   ANIONGAP 9   EGFRNONAA 47.4*       Significant Imaging: I have reviewed all pertinent imaging results/findings within the past 24 hours.  I have reviewed and interpreted all pertinent imaging results/findings within the past 24 hours.

## 2018-12-19 NOTE — ASSESSMENT & PLAN NOTE
· Concern for possible right foot osteomyelitis with wound cultures growing MSSA and Group G Streptococcus; blood cultures negative; ID there treated with Ceftriaxone while in-patient, with plans to discharge on Doxycycline. [facility].   · Status post debridement by podiatry at [facility] prior to transfer.   · Unclear if patient had imaging studies suggesting or confirming.   · Afebrile. No leukocytosis. ESR 75. CRP 93.    · Blood cultures 12/06 showed no growth after 5 days.   · ID consulted for recommendations regarding correct choice of antibiotic at [facility] and anticipated stop date of course. Status post one day of Ceftriaxone. Chanced to Cefazolin per ID recs; ID anticipates 6 weeks of IV ABX as with exposed bone. PICC line placed. Flagyl added on 12/13.   · Podiatry consulted; wound cultures ordered. Wound to be dressed with Betadyne and Kerflex for now. Recommending wound vac at discharge.   · Podiatry evaluated; plan is for outpatient debridement at [facility] possibly next week. Wife reports [facility] notified her that podiatry follow-up will be outsourced according to insurance provider.     ID discharge recommendations are as follows:   1.  Continue cefazolin 2 grams IV Q12 hours (renally dosed.  Discussed with ID PharmD).  2.  Add flagyl 500 mg orally Q8 hours for anaerobic coverage   3.  Anticipate 6 weeks of IV abx given with exposed bone.  Estimated end date 1/18/19  4.  Weekly cbc, cmp, crp, esr.  Until re-established with [facility] ID, fax results to ID at [phone]  5.  Will need ID follow up at [facility]

## 2018-12-19 NOTE — PROGRESS NOTES
[facility] Hospital Medicine  Progress Note    Patient Name: Jai Godinez  MRN: [id number]  Patient Class: IP- Inpatient   Admission Date: 12/6/2018  Length of Stay: 12 days  Attending Physician: Ezekiel Arana IV, MD  Primary Care Provider: Pepe Valverde MD    [facility] Medicine Team: [facility]P MED 5 Tremaine Merritt DO    Subjective:     Principal Problem:Amputated great toe of right foot    HPI:  This is a 78 year old female transferred from the [facility] for vascular surgery repair of right great toe status post amputation. He has a PMH of quadruple bypass (2001), severe peripheral artery disease, HFrEF (EF 45-50%), T2DM (A1c 8.1), HTN, SANDIE (on CPAP nightly) and diverticulosis.     He was hospitalized in [place] from 11/14 to 11/21 for acute on chronic heart failure exacerbation; discharge home with resolution of symptoms. He then developed one day of progressively worsening swelling of the right great toe associated with erythematous skin changes; for relief, he cut the skin of the toe with his pocket knife, at which point noticed purulent discharge, [facility]     Patient underwent right toe amputation done on 12/3 for pre-operative DANILO indicative of severe PAD. His post-operative course was complicated by development non-pulsatile blood flow after, as a result podiatry has left wound open. Vascular  at [facility] wanted to due angioplasty post-op, but CO2 angiography wasn't available. Also, concern for possible right foot osteomyelitis with wound cultures growing MSSA and Group G Streptococcus; blood cultures negative; ID there treated with Ceftriaxone while in-patient, with plans to discharge on Doxycycline.  In addition, on presentation, he was noted to have new development of Afib with slow ventricular response; rate control with Metoprolol and started on heparin drip while inpatient; transitioned to Dabigatran prior to transfer.     Patient/family requested transfer  elsewhere to accomplish procedure. [facility] contacted today and case was discussed with Dr. Patricia Sweeney,  who requested patient to transfer to hospital medicine with vascular surgery consults.        On arrival here, he reports episodic cramping pain isolated to the amputation site occurring approximately Q5min. Pain relieved by taking Tylenol PRN. Denies fevers, chills, chest pain, SOB, palpitations, numbness of extremities.         Hospital Course:  12/07: No acute events overnight. Vascular surgery assessed this morning; recommended repeat DANILO and tentative angiogram today. Wound care and podiatry consulted for open wound and right foot osteomyelitis.  Wound care defering to podiatry. Podiatry repeating cultures and XR of foot. ID consulted for antibiotic recs; Ceftriaxone changed to Cefazolin (day #1). LR maintenance started to attempt optimization of kidney function; d/c home Lisinopril. [facility]ing [facility] from [facility].   12/08: No events overnight. DANILO showing mild PAD. Scheduled for angiogram 12/12. BUN and Cr improved to 29 and 1.4 with maintenance LR; GFR 47. ESR 93, CRP 75. Day #2 of Cefazolin.   12/09: No events overnight. Day #3 of Cefazolin. Renal function same. Tylenol PRN changed to Gabapentin with pain improvement.   12/10: No events overnight. Day #4 of Cefazolin. Renal function same. Evaluated by Vascular; angiogram moved up to tomorrow. NPO at midnight.   12/11: No events overnight. Angiogram scheduled today. Day #5 of Cefazolin.   12/12: Angiogram yesterday concerning for no significant blood flow with collaterals below the right ankle. Vascular recommended hyperbaric oxygen as possible treatment modality, but not likely option given patient's insurance status. Discussed case with podiatry; will speak to vascular and discuss blood flow to area for possible amputation. No events overnight. Day #6 of Cefazolin.   12/13: No events overnight. Podiatry evaluated; planning for  [facility], with attempted treatment with IV antibiotics and wound vac, before amputation. PICC line placed today. Home health orders placed for attempted insurance coverage with [facility]; still waiting on answer. Day #7 of Cefazolin; per ID final recommendations will anticipate 6 week course of IV Cefazolin 2 g Q12H with Flagyl 500 mg Q8H with anticipated end date of 01/18/19 based on culture results from [facility].   12/15: No events overnight. Day #9 of Cefazolin. Pending IV antibiotic approval from [facility]. Heparin GTT discontinued; started on home Pradaxa. Added detemir to aspart insulin coverage. Labs now Q48H.   12/16: No events overnight. Day #10 of Cefazolin; Day #4 of Flagyl.   12/17/2018 No events overnight. Day #11 of Cefazolin, day #5 of Flagyl. Pt with no new complaints, pain well controlled. Anxious to leave pending [facility] approval.  12/18/2018 No events overnight. Day #12 of Cefazolin, Day #6 of flagyl. Home health approved through pt insurance, discharging pending delivery of antibiotics.    Interval History:  As 12/18/2018 above.     Review of Systems   Constitutional: Negative for appetite change, chills, diaphoresis, fatigue and fever.   HENT: Negative for congestion, sore throat and trouble swallowing.    Eyes: Negative for photophobia, pain and discharge.   Respiratory: Negative for cough, chest tightness and shortness of breath.    Cardiovascular: Negative for chest pain, palpitations and leg swelling.   Gastrointestinal: Negative for abdominal pain, diarrhea, nausea and vomiting.   Genitourinary: Negative for decreased urine volume, difficulty urinating and urgency.   Musculoskeletal: Positive for gait problem. Negative for back pain, myalgias and neck pain.   Skin: Positive for wound. Negative for pallor and rash.   Neurological: Negative for dizziness, weakness, light-headedness, numbness and headaches.     Objective:     Vital Signs (Most Recent):  Temp: 98 °F (36.7 °C)  (12/18/18 1536)  Pulse: 63 (12/18/18 1536)  Resp: 20 (12/18/18 1536)  BP: 128/66 (12/18/18 1536)  SpO2: 95 % (12/18/18 1536) Vital Signs (24h Range):  Temp:  [97.3 °F (36.3 °C)-98.1 °F (36.7 °C)] 98 °F (36.7 °C)  Pulse:  [63-73] 63  Resp:  [14-20] 20  SpO2:  [94 %-97 %] 95 %  BP: (123-136)/(58-80) 128/66     Weight: 104.3 kg (230 lb)  Body mass index is 33.97 kg/m².  No intake or output data in the 24 hours ending 12/18/18 1856   Physical Exam   Constitutional: He is oriented to person, place, and time. He appears well-developed and well-nourished. No distress.   HENT:   Head: Normocephalic and atraumatic.   Mouth/Throat: Uvula is midline, oropharynx is clear and moist and mucous membranes are normal. Abnormal dentition.   Eyes: Conjunctivae are normal. Pupils are equal, round, and reactive to light.   Neck: No JVD present.   Cardiovascular: Normal rate and normal heart sounds. An irregularly irregular rhythm present.   No murmur heard.  Pulses:       Radial pulses are 2+ on the right side, and 2+ on the left side.        Dorsalis pedis pulses are 1+ on the right side, and 2+ on the left side.   Well-healed sternotomy scar in midline.    Pulmonary/Chest: Effort normal and breath sounds normal. No respiratory distress.   Abdominal: Soft. Normal appearance and bowel sounds are normal. He exhibits no distension. There is no tenderness.   Musculoskeletal:   Extensive 3 cm open wound on right foot near site of 1st toe amputation with exposure of underlying bones. No purulent discharge. Dressing clean, dry, intact.    Neurological: He is alert and oriented to person, place, and time.   Skin: Skin is warm and dry. No bruising and no rash noted.       Significant Labs:   CBC:   Recent Labs   Lab 12/17/18  0628   WBC 8.84   HGB 11.8*   HCT 39.2*        CMP:   Recent Labs   Lab 12/17/18  0628      K 4.4      CO2 25   *   BUN 19   CREATININE 1.4   CALCIUM 9.9   ANIONGAP 9   EGFRNONAA 47.4*        Significant Imaging: I have reviewed all pertinent imaging results/findings within the past 24 hours.  I have reviewed and interpreted all pertinent imaging results/findings within the past 24 hours.    Assessment/Plan:      * Amputated great toe of right foot    · Status post amputation of first toe of right foot on 12/03 at [facility] secondary to either severe PAD with possible super-imposed osteomyelitis.   · [facility] at [facility] that post-op course course was complicated by development non-pulsatile blood flow after, as a result podiatry has left wound open. Vascular at [facility] wanted to due angioplasty post-op, but CO2 angiography wasn't available, prompting transfer here. See vascular surgery and podiatry note for images of wound.   · Case discussed with Dr. Gomez in Vascular Surgery here at transfer. Vascular surgery evaluated; recommended repeating DANILO's and tentatively planning for angiogram. DANILO's ordered; only showing mild PAD. Angiogram on 12/11 concerning for no significant blood flow with collaterals below the right ankle; recommended hyperbaric O2 as treatment modality, but not likely option given patient's insurance status. Podiatry re-evaluated; planning on outpatient debridement at [facility] with attempted treatment with IV antibiotics and wound vac, before possible amputation. SW working on podiatry follow-up at [facility].   · Exam notable for diminished pulses in right foot and lower extremity. .   · On heparin drip at low intensity initially for anticoagulation with new Afib. Transitioned to home Pradaxa on 12/15.   · Wound care consulted; deferred care recommendations to podiatry.   · Tylenol PRN changed to Gabapentin 100 mg TID with improved control.   · PT/OT ordered.        Open wound of right foot    See assessment for right great toe amputation and right foot osteomyelitis.        Stage 3 chronic kidney disease    · Improved.   · Unclear of baseline; no outside records available. Main hinderance preventing  patient from receiving regular angiogram vs with CO2.   · Could be acute, but patient with history of poorly controlled T2DM.   · BUN 37, Cr 1.6, and GFR 40 on admission.    · Held home Lisinopril in setting of possible STACY, and discontinued secondary to side effects.   · BMP Q48H.           Osteomyelitis of right foot    · Concern for possible right foot osteomyelitis with wound cultures growing MSSA and Group G Streptococcus; blood cultures negative; ID there treated with Ceftriaxone while in-patient, with plans to discharge on Doxycycline. [facility] from [facility].   · Status post debridement by podiatry at [facility] prior to transfer.   · Unclear if patient had imaging studies suggesting or confirming.   · Afebrile. No leukocytosis. ESR 75. CRP 93.    · Blood cultures 12/06 showed no growth after 5 days.   · ID consulted for recommendations regarding correct choice of antibiotic at [facility] and anticipated stop date of course. Status post one day of Ceftriaxone. Chanced to Cefazolin per ID recs; ID anticipates 6 weeks of IV ABX as with exposed bone. PICC line placed. Flagyl added on 12/13.   · Podiatry consulted; wound cultures ordered. Wound to be dressed with Betadyne and Kerflex for now. Recommending wound vac at discharge.   · Podiatry evaluated; plan is for outpatient debridement at [facility] possibly next week. Wife reports [facility] notified her that podiatry follow-up will be outsourced according to insurance provider.     ID discharge recommendations are as follows:   1.  Continue cefazolin 2 grams IV Q12 hours (renally dosed.  Discussed with ID PharmD).  2.  Add flagyl 500 mg orally Q8 hours for anaerobic coverage   3.  Anticipate 6 weeks of IV abx given with exposed bone.  Estimated end date 1/18/19  4.  Weekly cbc, cmp, crp, esr.  Until re-established with [facility] ID, fax results to ID at [phone]  5.  Will need ID follow up at [facility]       Atrial fibrillation with slow ventricular response    · Irregular  irregular rhythm on exam with HR controlled in lower 60's.   · Will hold home Metoprolol  12.5 mg given occasional bradycardia with max HR in past 24hrs at 75. If pt becomes tachycardic, will restart  with parameters to hold for HR <65.   · Started on heparin infusion at [facility] with plans to transition with Dabigatran.   · INR 1.2 on admission.   · Started heparin infusion here with low intensity initially and followed aPTT until therapeutic.  · [facility] records mentioned plan to discharge patient on Dabigatran based on approved coverage. Transitioned to Dabigatran on 12/15 for anticipated discharge.   · Patient has outpatient follow-up with cardiology at [facility] on 12/21/18.        Chronic systolic heart failure    · Last EF 40-50% at [facility] per transfer note.   · No signs of fluid overload on exam.   · Managed with Lasix 40 mg BID, Lisinopril 40 mg, and Metoprolol daily.   · Holding Metoprolol temporarily with HR on admission in lower 60's and Lisinopril with possible STACY.  Lisinopril changed to ARB on discharge given history of chronic dry cough and worsening systemic rash after Lisinopril dose was increased some months ago.  · Will order cardiac and diabetic diet.         Essential hypertension    · Controlled.   · Resuming Lasix, and holding Metoprolol with HR in lower 60's.   · Discontinued home Lisinopril because of concern for possible acute on chronic STACY, but patient and wife reports history of dry cough and worsening systemic erythematous rash after Lisinopril dose was increased some months ago. Will enter as allergy in patient's chart.   · Will discharge on ARB instead of ACE-I.        Type 2 diabetes mellitus, without long-term current use of insulin    · Last A1c 8 at outside facility.   · Holding home Metformin and Glipizide.  · Added Aspart and Detemir for basal and prandial coverage. Detemir increased to 7u BID   · Low dose sliding scale ordered on admission.          Peripheral arterial disease    See  assessment for amputation of great right toe.          VTE Risk Mitigation (From admission, onward)        Ordered     dabigatran etexilate capsule 150 mg  2 times daily      12/15/18 1010     heparin, porcine (PF) 100 unit/mL injection flush 10 Units  As needed (PRN)      12/06/18 2119     IP VTE HIGH RISK PATIENT  Once      12/06/18 9939              Tremaine Merritt DO  Department of Hospital Medicine   [facility]

## 2018-12-20 ENCOUNTER — PATIENT OUTREACH (OUTPATIENT)
Dept: ADMINISTRATIVE | Facility: CLINIC | Age: 78
End: 2018-12-20

## 2018-12-20 DIAGNOSIS — S98.111A AMPUTATED GREAT TOE OF RIGHT FOOT: Primary | ICD-10-CM

## 2018-12-20 NOTE — PATIENT INSTRUCTIONS
Sepsis     To treat sepsis, antibiotics and fluids may by given through an intravenous (IV) line.     Sepsis happens when your body responds with widespread inflammation to a bad infection or bacteremia--the presence of bacteria in your bloodstream. Sepsis can be deadly. Blood pressure may drop and the lungs and kidneys may start to fail. Emergency care for sepsis is crucial.  Risk factors  Those most at risk for sepsis are:  · Infants or older adults  · People who have an illness that weakens their immune system, such as cancer, AIDS, or diabetes  · People being treated with chemotherapy medicines or radiation, which weakens the immune system  · People who have had a transplant  · People with a very severe infection such as pneumonia, meningitis, or a urinary tract infection  When to go to the emergency department (ED)  Sepsis is an emergency. Go to the nearest ED if you have a fever with any of these symptoms:  · Chills and shaking  · Rapid heartbeat and breathing  · Trouble breathing  · Severe nausea or uncontrolled vomiting  · Confusion, disorientation, drowsiness, or dizziness  · Decreased urination  · Severe pain, including in the back or joints   What to expect in the ED  · Blood and urine tests are done to look for bacteria. They also check for organ failure.  · Blood, urine, or sputum cultures may be taken. The samples are sent to a lab. They are placed in a special container. Any bacteria should grow in 24 hours.  · X-rays or other imaging tests may be done.  A person with sepsis will be admitted to the hospital and treated with antibiotics. Treatment may also include oxygen and intravenous fluids.  Date Last Reviewed: 10/1/2016  © 4253-7049 [employer]. [address] [place] [address], [place] [address]. All rights reserved. This information is not intended as a substitute for professional medical care. Always follow your healthcare professional's instructions.

## 2018-12-21 ENCOUNTER — TELEPHONE (OUTPATIENT)
Dept: ADMINISTRATIVE | Facility: OTHER | Age: 78
End: 2018-12-21

## 2018-12-21 NOTE — PHYSICIAN QUERY
PT Name: Jai Godinez  MR #: [id number]  Physician Query Form - Renal Condition Clarification     CDS/: Jeni Parsons               Contact information: [email or web address]    This form is a permanent document in the medical record.     QueryDate: December 21, 2018    By submitting this query, we are merely seeking further clarification of documentation. Please utilize your independent clinical judgment when addressing the question(s) below.    The Medical record contains the following:   Indicator Supporting Clinical Findings Location in Medical Record    Kidney (Renal) Insufficiency     x Kidney (Renal) Failure / Injury Of note, he had suffered acute kidney injury with decreased GFR and increased creatinine,   which after several days of appropriate therapy had resolved to near normal   levels; however, I felt CO2 angiogram for the bulk procedure was necessary.      For STACY - 1.7 on admit and downtrending     Holding home Lisinopril in setting of possible STACY.    Op note 12/11                      HM POC note 12/6      HM PN 12/7    Nephrotoxic Agents     x BUN/Creatinine GFR BUN = 37, 34, 29, 27---->19    Creatinine = 1.6, 1.5, 1.4, 1.3, 1.7--->1.4    GFR = 40.4, 43.7, 47.4, 51.9--->47.4       Labs 12/6-12/17    Urine: Casts         Eosinophils      Dehydration      Nausea/Vomiting      Dialysis/CRRT     x Treatment: 12/07: LR maintenance started to attempt optimization of kidney function; d/c home Lisinopril.   DS 12/18   x Other:  Stage 3 chronic kidney disease    DS 12/18   Acute Kidney Injury / Acute Renal Failure has different defining criteria. A generally accepted guideline  is:   A greater than 100% (2X) rise in serum creatinine from baseline* occurring during the course of a single hospital stay.   *Baseline as determined by the providers judgment and consideration of previous lab values and other documentation, if available.    A diagnosis of Acute Kidney Injury/ Acute Renal Failure  should incorporate abnormal labs and clinical findings that are clinically significant      References: 1. Fely et al. Acute renal failure-definition, outcome measures, animal models, fluid therapy and information technology needs: the Second International Consensus Conference of the Acute Dialysis Quality Initiative (ADQI) Group. Crit Care 2004; 8:B204; 2. Lyn et al. Acute Kidney Injury Network: report of an initiative to improve outcomes in acute kidney injury. Crit Care 2007; 11:R31; 3. Kidney Disease: Improving Global Outcomes (KDIGO). Acute Kidney Injury Work Group. KDIGO clinical practice guidelines for acute kidney injury. Kidney Int Suppl 2012; 2:1.    The clinical guidelines noted below is only a system guideline, it does not replace the providers clinical judgment.    Provider, please specify the diagnosis or diagnoses associated with above clinical findings.    [   ] Other Acute Kidney Failure/Injury (please specify): ____________     [  x ] Unspecified Acute Kidney Failure/Injury      [   ] Acute Renal Insufficiency  Consider if SCr rise is transient and normalizes quickly with no efforts at real resuscitation of vital signs and perfusion   [   ] Chronic Kidney Disease (CKD) stage 3   Moderately reduced kidney function eGFR 30-59    [   ] Other (please specify): _________________________________   [   ]  Clinically Undetermined       Please document in your progress notes daily for the duration of treatment until resolved and include in your discharge summary.

## 2018-12-21 NOTE — TELEPHONE ENCOUNTER
----- Message from Lukas Tubbs sent at 12/21/2018  4:03 PM CST -----  Type:  Sooner Apoointment Request    Caller is requesting a sooner appointment.  Caller declined first available appointment listed below.  Caller will not accept being placed on the waitlist and is requesting a message be sent to doctor.    Name of Caller: [employer] rep; chapito/pt wife chiquita   When is the first available appointment?  12/31/18  Symptoms:  Open wound on amputated toe   Best Call Back Number:  [phone]  Additional Information: please call pt to schedule appointment as soon as possible. Pt has a referral/authorization and will fax over.

## 2018-12-24 NOTE — TELEPHONE ENCOUNTER
Called patient and informed him that he really needs to [facility] the [facility] to address the wound that has not been dressed since surgery. If he is unable to get [facility] touch with [facility]. Mitchell suggests going to ER for treatment. Patient verbalized understanding.

## 2018-12-26 ENCOUNTER — OUTPATIENT CASE MANAGEMENT (OUTPATIENT)
Dept: ADMINISTRATIVE | Facility: OTHER | Age: 78
End: 2018-12-26

## 2018-12-26 NOTE — PROGRESS NOTES
Please note the following patient's information was forwarded to [facility] ([facility]) for case management and/or  on 12/20/18.    Please see the media section of patient's chart for additional details.    Please contact Ext. [phone] with any questions.    Thank you,    Akilah Rider, [facility] [place]  Outpatient Care Mgmt.  [phone]

## 2018-12-31 ENCOUNTER — OFFICE VISIT (OUTPATIENT)
Dept: PODIATRY | Facility: CLINIC | Age: 78
End: 2018-12-31
Payer: MEDICARE

## 2018-12-31 ENCOUNTER — TELEPHONE (OUTPATIENT)
Dept: PODIATRY | Facility: CLINIC | Age: 78
End: 2018-12-31

## 2018-12-31 VITALS — SYSTOLIC BLOOD PRESSURE: 111 MMHG | HEART RATE: 77 BPM | TEMPERATURE: 98 F | DIASTOLIC BLOOD PRESSURE: 59 MMHG

## 2018-12-31 DIAGNOSIS — S91.301A OPEN WOUND OF FOOT, RIGHT, INITIAL ENCOUNTER: Primary | ICD-10-CM

## 2018-12-31 DIAGNOSIS — Z89.431 PARTIAL NONTRAUMATIC AMPUTATION OF FOOT, RIGHT: ICD-10-CM

## 2018-12-31 DIAGNOSIS — E11.49 TYPE II DIABETES MELLITUS WITH NEUROLOGICAL MANIFESTATIONS: ICD-10-CM

## 2018-12-31 PROCEDURE — 99212 OFFICE O/P EST SF 10 MIN: CPT | Mod: S$PBB,,, | Performed by: PODIATRIST

## 2018-12-31 PROCEDURE — 99999 PR PBB SHADOW E&M-EST. PATIENT-LVL III: CPT | Mod: PBBFAC,,, | Performed by: PODIATRIST

## 2018-12-31 PROCEDURE — 99213 OFFICE O/P EST LOW 20 MIN: CPT | Mod: PBBFAC,PO | Performed by: PODIATRIST

## 2018-12-31 NOTE — PROGRESS NOTES
Subjective:      Patient ID: Jai Godinez is a 78 y.o. male.    Chief Complaint: Foot Problem (right great toe amputation)    Wound right foot s/p about 1 month distal [facility].  Has follow up with surgeon there 12/16/18.  VAC in place.  Denies signs infection, pain controlled.    Review of Systems   Constitution: Negative for chills, diaphoresis, fever, malaise/fatigue and night sweats.   Cardiovascular: Negative for claudication, cyanosis, leg swelling and syncope.   Skin: Positive for poor wound healing. Negative for color change, dry skin, nail changes, rash, suspicious lesions and unusual hair distribution.   Musculoskeletal: Negative for falls, joint pain, joint swelling, muscle cramps, muscle weakness and stiffness.   Gastrointestinal: Negative for constipation, diarrhea, nausea and vomiting.   Neurological: Positive for numbness and sensory change. Negative for brief paralysis, disturbances in coordination, focal weakness, paresthesias and tremors.           Objective:      Physical Exam   Constitutional: He is oriented to person, place, and time. He appears well-developed and well-nourished. He is cooperative.   Oriented to time, place, and person.   Cardiovascular:   Pulses:       Dorsalis pedis pulses are 1+ on the right side, and 1+ on the left side.        Posterior tibial pulses are 1+ on the right side, and 1+ on the left side.   Capillary fill time 3-5 seconds.  All toes warm to touch.      Negative lower extremity edema bilateral.    Negative elevational pallor and dependent rubor bilateral.    Pulses trace.   Musculoskeletal:   Partial distal 1st ray amputation right.    Otherwise, Normal angle, base, station of gait. Decreased stride length, early heel off, moderately propulsive toe off bilateral.    All ten toes without clubbing, cyanosis, or signs of ischemia.      No pain to palpation bilateral lower extremities.      Range of motion, stability, muscle strength, and muscle  tone are age and health appropriate normal bilateral feet and legs.       Lymphadenopathy:   Negative lymphadenopathy bilateral popliteal fossa and tarsal tunnel.  Negative lymphangitic streaking bilateral foot/ankle bilateral.     Neurological: He is alert and oriented to person, place, and time. He has normal strength. He is not disoriented. He displays no atrophy and no tremor. A sensory deficit is present. He exhibits normal muscle tone.   Reflex Scores:       Patellar reflexes are 2+ on the right side and 2+ on the left side.       Achilles reflexes are 2+ on the right side and 2+ on the left side.  Decreased/absent vibratory sensation bilateral feet to 128Hz tuning fork.     Skin: Skin is warm, dry and intact. No abrasion, no bruising, no burn, no ecchymosis, no laceration, no lesion, no petechiae and no rash noted. He is not diaphoretic. No cyanosis or erythema. No pallor. Nails show no clubbing.   Wound distal medial right foot is granular fibrous mix about 60/40 with moderate serosanguinous drainage only, bone distal 1st ray viable and visible in wound and  without pus, tracking, fluctuance, malodor, or cardinal signs infection.    Otherwise, Skin thin, atrophic, with decreased density and distribution of pedal hair bilateral, but without hyperpigmentation, flower discoloration,  ulcers, masses, nodules or cords palpated bilateral feet and legs.                   Assessment:       Encounter Diagnoses   Name Primary?    Open wound of foot, right, initial encounter Yes    Type II diabetes mellitus with neurological manifestations     Partial nontraumatic amputation of foot, right          Plan:       Jai was seen today for foot problem.    Diagnoses and all orders for this visit:    Open wound of foot, right, initial encounter    Type II diabetes mellitus with neurological manifestations    Partial nontraumatic amputation of foot, right      I counseled the patient on his conditions, their implications  and medical management.    Packed with wound gel soaked kerlix and ACE under no tension right.      Continue DARCO forefoot offload shoe right, casual shoe left.    Continue HH per surgeon/D/C orders from hospital.    Follow with [facility].  Sooner if worsening either here, UC, ED, or [facility].    Adequate vitamin supplementation, protein intake, and hydration - discussed with patient.        Follow-up if symptoms worsen or fail to improve.

## 2018-12-31 NOTE — TELEPHONE ENCOUNTER
"----- Message from Adeline Castle sent at 12/31/2018  3:32 PM CST -----  Contact: Piper Bro MSA with [facility]  I received a fax from Piper Bro MSA, with the [facility].  The fax said, "Our records indicate that we have referred our [place] to your facility/office for care.  Please review your records for the following patient and fax back any pending appointments, recent office notes, and/or procedure notes from the specified time frame.  Please notify us if the  did not keep this appointment.  If you have any questions, please contact me at [phone], Fax [phone].  Bandar Date: 12/31/18, please send reports from the above date to present.  Thank you for helping us care for our veterans."  "

## 2019-01-10 ENCOUNTER — TELEPHONE (OUTPATIENT)
Dept: INFECTIOUS DISEASES | Facility: CLINIC | Age: 79
End: 2019-01-10

## 2019-01-10 NOTE — TELEPHONE ENCOUNTER
Spoke with patient's wife and she stated [facility] and will have another one in a month.

## 2019-01-10 NOTE — TELEPHONE ENCOUNTER
----- Message from CONSUELO Rand, ANP sent at 1/10/2019  9:39 AM CST -----  Reena:  Will you please call Mr. Obrienock and see if he established with an [facility] at [facility] or does he need (or can he) follow up with ID here? Thanks.

## 2019-01-12 ENCOUNTER — DOCUMENTATION ONLY (OUTPATIENT)
Dept: INFECTIOUS DISEASES | Facility: CLINIC | Age: 79
End: 2019-01-12

## 2019-01-12 NOTE — PROGRESS NOTES
ID note:  Patient discharged from hospital stay for foot wound infection and osteo on IV abx (cezaolin)  He is a [facility] patient and wife has confirmed he is now following with [facility].  See Tel Call note of 1/10.    Received labs from [facility]:  No inflammatory markers drawn.   Labs 1/2 -   Wbc - 6.6  Creatinine 1.2  Liver enzymes wnl,    Will communicate with [facility] to ensure labs forwarded to patient's ID physician at [facility].

## 2021-04-29 ENCOUNTER — PATIENT MESSAGE (OUTPATIENT)
Dept: RESEARCH | Facility: HOSPITAL | Age: 81
End: 2021-04-29

## (undated) DEVICE — FILTER HYDROPHOBIC BACTRLOGCL

## (undated) DEVICE — KIT CUSTOM PROCEDURE CONTRAST

## (undated) DEVICE — SOL NS 1000CC

## (undated) DEVICE — KIT INTRO MICRO NIT VSI 4FR

## (undated) DEVICE — DEVICE CLOSURE MYNX GRIP 5FR

## (undated) DEVICE — GUIDEWIRE AMPLATZ 180 46-525

## (undated) DEVICE — SOL 9P NACL IRR PIC IL

## (undated) DEVICE — TUBING CNTRST INJ ADPT 72IN

## (undated) DEVICE — VISE RADIFOCUS MULTI TORQUE

## (undated) DEVICE — DRESSING ADH FILM TELFA 2X3IN

## (undated) DEVICE — CATH ANGLED GLIDE CATH 5FR

## (undated) DEVICE — DRESSING TELFA STRL 4X3 LF

## (undated) DEVICE — SET MICROPUNCTURE

## (undated) DEVICE — INTRODUCER VASC RADPQ 5FRX10CM

## (undated) DEVICE — COVERS PROBE NR-48 STERILE

## (undated) DEVICE — Device

## (undated) DEVICE — STOPCOCK NDLS INJ MALE LL IV

## (undated) DEVICE — COVER INSTR ELASTIC BAND 40X20

## (undated) DEVICE — GUIDEWIRE LAUREATE 035IN 180CM

## (undated) DEVICE — GUIDEWIRE LAUREATE 035IN 260CM

## (undated) DEVICE — SYS LABEL CORRECT MED

## (undated) DEVICE — KIT CUSTOM WASTE BAG

## (undated) DEVICE — TEGADERM IV

## (undated) DEVICE — CATH ANGIO SOFT VU 5FR 65CM

## (undated) DEVICE — SET DECANTER MEDICHOICE

## (undated) DEVICE — CATH GLIDE ANGLED 5FR 65CM

## (undated) DEVICE — DRESSING GAUZE 6PLY 4X4